# Patient Record
Sex: FEMALE | Race: WHITE | NOT HISPANIC OR LATINO | Employment: PART TIME | URBAN - METROPOLITAN AREA
[De-identification: names, ages, dates, MRNs, and addresses within clinical notes are randomized per-mention and may not be internally consistent; named-entity substitution may affect disease eponyms.]

---

## 2023-03-15 ENCOUNTER — APPOINTMENT (OUTPATIENT)
Dept: LAB | Facility: CLINIC | Age: 33
End: 2023-03-15

## 2023-03-15 ENCOUNTER — OCCMED (OUTPATIENT)
Dept: URGENT CARE | Facility: CLINIC | Age: 33
End: 2023-03-15

## 2023-03-15 DIAGNOSIS — Z02.1 PRE-EMPLOYMENT HEALTH SCREENING EXAMINATION: Primary | ICD-10-CM

## 2023-03-15 DIAGNOSIS — Z02.1 PRE-EMPLOYMENT HEALTH SCREENING EXAMINATION: ICD-10-CM

## 2023-03-15 LAB
RUBV IGG SERPL IA-ACNC: >175 IU/ML
RUBV IGG SERPL IA-ACNC: >175 IU/ML

## 2023-03-15 NOTE — PROGRESS NOTES
This encounter was created for [facility] orders only   [address] [facility]        NAME: Sven Tesfaye is a 33 y o  female  : 1990    MRN: [id number]  DATE: March 15, 2023  TIME: 1:53 PM    There were no vitals taken for this visit  Assessment and Plan   Pre-employment health screening examination [Z02 1]  1  Pre-employment health screening examination  Rubeola antibody IgG    Mumps antibody, IgG    Rubella antibody, IgG    Varicella zoster antibody, IgG    Quantiferon TB Gold Plus    Quantiferon TB Gold Plus    Varicella zoster antibody, IgG    Rubella antibody, IgG    Mumps antibody, IgG    Rubeola antibody IgG    Mumps antibody, IgG    Rubeola antibody IgG    Rubella antibody, IgG    Varicella zoster antibody, IgG    Quantiferon TB Gold Plus            Patient Instructions       Follow up with PCP in 3-5 days  Proceed to  ER if symptoms worsen  Chief Complaint   No chief complaint on file  History of Present Illness       HPI    Review of Systems   Review of Systems      Current Medications     No current outpatient medications on file  Current Allergies     Allergies as of 03/15/2023   • (Not on File)            The following portions of the patient's history were reviewed and updated as appropriate: allergies, current medications, past family history, past medical history, past social history, past surgical history and problem list      No past medical history on file  No past surgical history on file  No family history on file  Medications have been verified  Objective   There were no vitals taken for this visit         Physical Exam     Physical Exam

## 2023-03-16 LAB
MEV IGG SER QL IA: NORMAL
MEV IGG SER QL IA: NORMAL
MUV IGG SER QL IA: NORMAL
MUV IGG SER QL IA: NORMAL
VZV IGG SER QL IA: NORMAL
VZV IGG SER QL IA: NORMAL

## 2023-03-17 LAB
GAMMA INTERFERON BACKGROUND BLD IA-ACNC: 0.04 IU/ML
GAMMA INTERFERON BACKGROUND BLD IA-ACNC: 0.04 IU/ML
M TB IFN-G BLD-IMP: NEGATIVE
M TB IFN-G BLD-IMP: NEGATIVE
M TB IFN-G CD4+ BCKGRND COR BLD-ACNC: 0.01 IU/ML
MITOGEN IGNF BCKGRD COR BLD-ACNC: >10 IU/ML
MITOGEN IGNF BCKGRD COR BLD-ACNC: >10 IU/ML

## 2023-03-29 ENCOUNTER — OFFICE VISIT (OUTPATIENT)
Dept: OBGYN CLINIC | Facility: CLINIC | Age: 33
End: 2023-03-29

## 2023-03-29 VITALS — SYSTOLIC BLOOD PRESSURE: 100 MMHG | DIASTOLIC BLOOD PRESSURE: 60 MMHG | WEIGHT: 166 LBS

## 2023-03-29 DIAGNOSIS — Z30.41 SURVEILLANCE FOR BIRTH CONTROL, ORAL CONTRACEPTIVES: ICD-10-CM

## 2023-03-29 DIAGNOSIS — Z01.419 WOMEN'S ANNUAL ROUTINE GYNECOLOGICAL EXAMINATION: Primary | ICD-10-CM

## 2023-03-29 PROBLEM — O99.891: Status: ACTIVE | Noted: 2021-12-15

## 2023-03-29 PROBLEM — O99.341 ANXIETY DURING PREGNANCY IN FIRST TRIMESTER, ANTEPARTUM: Status: ACTIVE | Noted: 2022-01-19

## 2023-03-29 PROBLEM — F41.9 ANXIETY DURING PREGNANCY IN FIRST TRIMESTER, ANTEPARTUM: Status: ACTIVE | Noted: 2022-01-19

## 2023-03-29 PROBLEM — R53.83 FATIGUE: Status: ACTIVE | Noted: 2021-04-08

## 2023-03-29 PROBLEM — Z34.90 SUPERVISION OF NORMAL PREGNANCY: Status: ACTIVE | Noted: 2022-01-24

## 2023-03-29 PROBLEM — R79.89 ELEVATED CORTISOL LEVEL: Status: ACTIVE | Noted: 2021-04-08

## 2023-03-29 PROBLEM — Z20.4: Status: ACTIVE | Noted: 2021-12-15

## 2023-03-29 PROBLEM — L74.9 SWEATING ABNORMALITY: Status: ACTIVE | Noted: 2021-04-08

## 2023-03-29 RX ORDER — ACETAMINOPHEN AND CODEINE PHOSPHATE 120; 12 MG/5ML; MG/5ML
1 SOLUTION ORAL DAILY
Qty: 84 TABLET | Refills: 3 | Status: SHIPPED | OUTPATIENT
Start: 2023-03-29 | End: 2023-06-21

## 2023-03-29 RX ORDER — BUSPIRONE HYDROCHLORIDE 10 MG/1
TABLET ORAL
COMMUNITY
Start: 2023-03-19

## 2023-03-29 RX ORDER — ACETAMINOPHEN AND CODEINE PHOSPHATE 120; 12 MG/5ML; MG/5ML
SOLUTION ORAL
COMMUNITY
Start: 2023-03-09 | End: 2023-03-29 | Stop reason: SDUPTHER

## 2023-03-29 RX ORDER — SERTRALINE HYDROCHLORIDE 100 MG/1
TABLET, FILM COATED ORAL
COMMUNITY
Start: 2023-03-19

## 2023-03-29 NOTE — PROGRESS NOTES
Subjective    HPI:     Jassi Arroyo is a 29 y o  female  She is a  1 Para 1, with a C/S delivery 7 months ago  Her menstrual cycles are currently absent due to breastfeeding  Her current method of contraception includes Micronor  She states she experiences discomfort with penetration  She feels it's really tight  She underwent pelvic floor therapy after delivery and was noted to have misalignment that was causing her pelvic floor to work harder  She continues to do the exercises that been recommended  She denies /GI and Gyn complaints  She feels safe at home  She is on Zoloft and buspar depression/anxiety  She states she is somewhat stable, currently looking for another therapist  Medical, surgical and family history reviewed  Her dental care is up-to-date  She eats a healthy diet and exercises regularly  She is happy with her weight  Gynecologic History    No LMP recorded  (Menstrual status: Amenorrheic other)  Currently breastfeediing  Gardasil Vaccine Series: completed  Last Pap: 3/1/21  Results were: normal    Obstetric History    OB History    Para Term  AB Living   1 1 1     1   SAB IAB Ectopic Multiple Live Births           1      # Outcome Date GA Lbr Luis/2nd Weight Sex Delivery Anes PTL Lv   1 Term 22 40w1d  3730 g (8 lb 3 6 oz) F CS-Unspec EPI  OFELIA       The following portions of the patient's history were reviewed and updated as appropriate: allergies, current medications, past family history, past medical history, past social history, past surgical history and problem list     Review of Systems    Pertinent items are noted in HPI  Objective    Physical Exam  Constitutional:       Appearance: Normal appearance  She is well-developed  Genitourinary:      Vulva, bladder and urethral meatus normal       No lesions in the vagina        Right Labia: No rash, tenderness, lesions, skin changes or Bartholin's cyst      Left Labia: No tenderness, lesions, skin changes, Bartholin's cyst or rash  No labial fusion noted  No inguinal adenopathy present in the right or left side  No vaginal discharge, erythema, tenderness, bleeding or granulation tissue  No vaginal prolapse present  No vaginal atrophy present  Right Adnexa: not tender, not full and no mass present  Left Adnexa: not tender, not full and no mass present  Cervix is not parous  No cervical motion tenderness, discharge, friability, lesion, polyp or nabothian cyst       Uterus is not enlarged, tender, irregular or prolapsed  No uterine mass detected  Uterus is anteverted  Pelvic exam was performed with patient in the lithotomy position  Breasts:     Breasts are symmetrical       Right: No inverted nipple, mass, nipple discharge, skin change or tenderness  Left: No inverted nipple, mass, nipple discharge, skin change or tenderness  HENT:      Head: Normocephalic and atraumatic  Neck:      Thyroid: No thyromegaly  Cardiovascular:      Rate and Rhythm: Normal rate and regular rhythm  Heart sounds: Normal heart sounds, S1 normal and S2 normal    Pulmonary:      Effort: Pulmonary effort is normal       Breath sounds: Normal breath sounds  Abdominal:      General: Bowel sounds are normal  There is no distension  Palpations: Abdomen is soft  There is no mass  Tenderness: There is no abdominal tenderness  There is no guarding  Hernia: There is no hernia in the left inguinal area or right inguinal area  Musculoskeletal:      Cervical back: Neck supple  Lymphadenopathy:      Cervical: No cervical adenopathy  Upper Body:      Right upper body: No supraclavicular or axillary adenopathy  Left upper body: No supraclavicular or axillary adenopathy  Lower Body: No right inguinal adenopathy  No left inguinal adenopathy  Neurological:      Mental Status: She is alert  Skin:     General: Skin is warm and dry  Findings: No rash  Psychiatric:         Attention and Perception: Attention and perception normal          Mood and Affect: Mood and affect normal          Speech: Speech normal          Behavior: Behavior is cooperative  Thought Content: Thought content normal          Cognition and Memory: Cognition and memory normal          Judgment: Judgment normal    Vitals and nursing note reviewed  Assessment and Plan    Diagnoses and all orders for this visit:    Women's annual routine gynecological examination  -     Liquid-based pap, screening    Surveillance for birth control, oral contraceptives  -     norethindrone (MICRONOR) 0 35 MG tablet; Take 1 tablet (0 35 mg total) by mouth daily      Patient informed of a Stable GYN exam  A pap smear was performed  I have discussed the importance of exercise and healthy diet as well as adequate intake of calcium and vitamin D  The current ASCCP guidelines were reviewed  The low risk patient will receive pap smear screening every 3 years until the age of 34 and then every 3 to 5 years with HPV co-testing from the ages of 33-67  I emphasized the importance of an annual pelvic and breast exam  A yearly mammogram is recommended for breast cancer screening starting at age 36  Results will be released to [facility], if abnormal will call to review and discuss treatment plan  All questions have been answered to her satisfaction  Contraception: Micronor  Follow up in: 1 year or sooner if needed

## 2023-04-03 LAB
HPV HR 12 DNA CVX QL NAA+PROBE: NEGATIVE
HPV16 DNA CVX QL NAA+PROBE: NEGATIVE
HPV18 DNA CVX QL NAA+PROBE: NEGATIVE

## 2023-04-05 LAB
LAB AP GYN PRIMARY INTERPRETATION: NORMAL
Lab: NORMAL

## 2023-05-03 ENCOUNTER — CONSULT (OUTPATIENT)
Dept: SURGERY | Facility: CLINIC | Age: 33
End: 2023-05-03

## 2023-05-03 VITALS
HEIGHT: 63 IN | HEART RATE: 65 BPM | BODY MASS INDEX: 29.61 KG/M2 | TEMPERATURE: 97.3 F | OXYGEN SATURATION: 99 % | WEIGHT: 167.1 LBS | DIASTOLIC BLOOD PRESSURE: 67 MMHG | SYSTOLIC BLOOD PRESSURE: 102 MMHG | RESPIRATION RATE: 12 BRPM

## 2023-05-03 DIAGNOSIS — R22.2 SUBCUTANEOUS NODULE OF ABDOMINAL WALL: Primary | ICD-10-CM

## 2023-05-03 NOTE — ASSESSMENT & PLAN NOTE
There is no evidence of hernia that I can appreciate  There may be a small nodule in the subcu tissue  Nothing to do at this time  See us back here if needed

## 2023-05-03 NOTE — PROGRESS NOTES
Office Visit - General Surgery  Fortunato Reid MRN: [id number]  Encounter: [id number]    Assessment and Plan  Problem List Items Addressed This Visit        Other    Subcutaneous nodule of abdominal wall - Primary     There is no evidence of hernia that I can appreciate  There may be a small nodule in the subcu tissue  Nothing to do at this time  See us back here if needed  Chief Complaint:  Fortunato Reid is a 29 y o  female who presents for Hernia (Consult upper left quadrant hernia )    Subjective  41-year-old female who noted a lump in the left upper abdomen about a week or so ago  She noticed this after doing  Some discomfort  No change in bowel or bladder function    History reviewed  No pertinent past medical history  Past Surgical History:   Procedure Laterality Date     SECTION  2022    TONSILECTOMY AND ADNOIDECTOMY         Family History   Problem Relation Age of Onset    Diabetes type II Mother     Hypertension Mother     Heart block Father        Social History     Tobacco Use    Smoking status: Never    Smokeless tobacco: Never   Substance Use Topics    Alcohol use: Yes    Drug use: Never        Medications  Current Outpatient Medications on File Prior to Visit   Medication Sig Dispense Refill    busPIRone (BUSPAR) 10 mg tablet       norethindrone (MICRONOR) 0 35 MG tablet Take 1 tablet (0 35 mg total) by mouth daily 84 tablet 3    sertraline (ZOLOFT) 100 mg tablet        No current facility-administered medications on file prior to visit  Allergies  Allergies   Allergen Reactions    Penicillins Hives       Review of Systems   Constitutional: Negative for chills and fever  HENT: Negative for ear pain and sore throat  Eyes: Negative for pain and visual disturbance  Respiratory: Negative for cough and shortness of breath  Cardiovascular: Negative for chest pain and palpitations  Gastrointestinal: Negative for abdominal pain and vomiting  Genitourinary: Negative for dysuria and hematuria  Musculoskeletal: Negative for arthralgias and back pain  Skin: Negative for color change and rash  Neurological: Negative for seizures and syncope  All other systems reviewed and are negative  Objective  Vitals:    05/03/23 1506   BP: 102/67   Pulse: 65   Resp: 12   Temp: (!) 97 3 °F (36 3 °C)   SpO2: 99%       Physical Exam  Vitals and nursing note reviewed  Constitutional:       General: She is not in acute distress  Appearance: She is well-developed  She is not diaphoretic  HENT:      Head: Normocephalic and atraumatic  Right Ear: External ear normal       Left Ear: External ear normal    Eyes:      General: No scleral icterus  Conjunctiva/sclera: Conjunctivae normal    Neck:      Thyroid: No thyromegaly  Trachea: No tracheal deviation  Cardiovascular:      Rate and Rhythm: Normal rate and regular rhythm  Heart sounds: Normal heart sounds  No murmur heard  No friction rub  Pulmonary:      Effort: Pulmonary effort is normal  No respiratory distress  Breath sounds: Normal breath sounds  No wheezing or rales  Abdominal:      General: There is no distension  Palpations: Abdomen is soft  There is no mass  Tenderness: There is no abdominal tenderness  There is no guarding or rebound  Comments: Questionable small nodule left upper abdomen no hernia defect   Musculoskeletal:         General: Normal range of motion  Cervical back: Neck supple  Lymphadenopathy:      Cervical: No cervical adenopathy  Skin:     General: Skin is warm and dry  Neurological:      Mental Status: She is alert and oriented to person, place, and time  Psychiatric:         Behavior: Behavior normal          Thought Content:  Thought content normal          Judgment: Judgment normal

## 2023-05-11 ENCOUNTER — HOSPITAL ENCOUNTER (EMERGENCY)
Facility: HOSPITAL | Age: 33
Discharge: HOME | End: 2023-05-11
Attending: EMERGENCY MEDICINE

## 2023-05-11 VITALS
RESPIRATION RATE: 14 BRPM | TEMPERATURE: 97.8 F | SYSTOLIC BLOOD PRESSURE: 122 MMHG | DIASTOLIC BLOOD PRESSURE: 70 MMHG | HEART RATE: 77 BPM | OXYGEN SATURATION: 99 %

## 2023-05-11 DIAGNOSIS — S61.012A LACERATION OF LEFT THUMB WITHOUT FOREIGN BODY WITHOUT DAMAGE TO NAIL, INITIAL ENCOUNTER: Primary | ICD-10-CM

## 2023-05-11 RX ORDER — LIDOCAINE HYDROCHLORIDE 10 MG/ML
5 INJECTION, SOLUTION EPIDURAL; INFILTRATION; INTRACAUDAL; PERINEURAL ONCE
Status: COMPLETED | OUTPATIENT
Start: 2023-05-11 | End: 2023-05-11

## 2023-05-11 RX ADMIN — LIDOCAINE HYDROCHLORIDE 5 ML: 10 INJECTION, SOLUTION EPIDURAL; INFILTRATION; INTRACAUDAL; PERINEURAL at 08:38

## 2023-05-11 NOTE — Clinical Note
Earnest Vazquze was seen and treated in our emergency department on 5/11/2023  No restrictions            Diagnosis:     Maria Esther Ragsdale  may return to work on return date  She may return on this date: 05/12/2023         If you have any questions or concerns, please don't hesitate to call        Ayah Jack MD    ______________________________           _______________          _______________  Hospital Representative                              Date                                Time

## 2023-05-11 NOTE — ED PROVIDER NOTES
History  Chief Complaint   Patient presents with   • Finger Laceration     Pt presents to the ED with c/o laceration 1st digit left hand  Attempted to open a can     HPI    43-year-old female presenting for evaluation of a laceration to her left thumb  Occurred when opening a can of corn this morning  No other areas of injury  Last tetanus shot was in   Hemostatic at the time of arrival     Prior to Admission Medications   Prescriptions Last Dose Informant Patient Reported? Taking?   busPIRone (BUSPAR) 10 mg tablet   Yes No   norethindrone (MICRONOR) 0 35 MG tablet   No No   Sig: Take 1 tablet (0 35 mg total) by mouth daily   sertraline (ZOLOFT) 100 mg tablet   Yes No      Facility-Administered Medications: None       History reviewed  No pertinent past medical history  Past Surgical History:   Procedure Laterality Date   •  SECTION  2022   • TONSILECTOMY AND ADNOIDECTOMY         Family History   Problem Relation Age of Onset   • Diabetes type II Mother    • Hypertension Mother    • Heart block Father      I have reviewed and agree with the history as documented  E-Cigarette/Vaping     E-Cigarette/Vaping Substances     Social History     Tobacco Use   • Smoking status: Never   • Smokeless tobacco: Never   Substance Use Topics   • Alcohol use: Yes   • Drug use: Never       Review of Systems   Skin: Positive for wound  Neurological: Negative for weakness and numbness  Physical Exam  Physical Exam  Vitals and nursing note reviewed  Constitutional:       General: She is not in acute distress  Appearance: Normal appearance  She is not ill-appearing  Cardiovascular:      Rate and Rhythm: Normal rate  Pulses: Normal pulses  Pulmonary:      Effort: Pulmonary effort is normal  No respiratory distress  Abdominal:      General: There is no distension  Musculoskeletal:      Comments: 3 cm laceration over the volar aspect of the L thumb just proximal to the IP joint   Well approximated but gapes open with full ROM of the IP joint  Superficial  No surrounding erythema or edema  Neurological:      Comments: Intact sensation to light touch to the tip of the L thumb         Vital Signs  ED Triage Vitals [05/11/23 0818]   Temperature Pulse Respirations Blood Pressure SpO2   97 8 °F (36 6 °C) 77 14 122/70 99 %      Temp Source Heart Rate Source Patient Position - Orthostatic VS BP Location FiO2 (%)   Oral Monitor -- Right arm --      Pain Score       --           Vitals:    05/11/23 0818   BP: 122/70   Pulse: 77         Visual Acuity      ED Medications  Medications   lidocaine (PF) (XYLOCAINE-MPF) 1 % injection 5 mL (5 mL Infiltration Given by Other 5/11/23 4317)       Diagnostic Studies  Results Reviewed     None                 No orders to display              Procedures  Laceration repair    Date/Time: 5/11/2023 9:04 AM  Performed by: Stephen Gomez MD  Authorized by: Stephen Gomez MD   Consent: Verbal consent obtained  Risks and benefits: risks, benefits and alternatives were discussed  Consent given by: patient  Required items: required blood products, implants, devices, and special equipment available  Body area: upper extremity  Location details: left thumb  Laceration length: 3 cm  Tendon involvement: none  Vascular damage: no  Anesthesia: local infiltration    Wound Dehiscence:  Superficial Wound Dehiscence: simple closure      Procedure Details:  Preparation: Patient was prepped and draped in the usual sterile fashion  Irrigation solution: saline  Irrigation method: syringe  Amount of cleaning: standard  Debridement: none  Degree of undermining: none  Wound skin closure material used: 5-0 ethilon    Number of sutures: 3  Technique: simple  Approximation: close  Approximation difficulty: simple  Dressing: gauze roll  Patient tolerance: patient tolerated the procedure well with no immediate complications               ED Course Medical Decision Making  3 cm superficial laceration repaired with 5-0 Ethilon according to procedure note above  Thumb is neurovascularly intact  No other areas of injury  Tetanus up-to-date  Sutures to be removed in 7 to 10 days  Reasons to return to the emergency department discussed  Risk  Prescription drug management  Disposition  Final diagnoses:   Laceration of left thumb without foreign body without damage to nail, initial encounter     Time reflects when diagnosis was documented in both MDM as applicable and the Disposition within this note     Time User Action Codes Description Comment    5/11/2023  8:52 AM Gina Neff Add [S61 012A] Laceration of left thumb without foreign body without damage to nail, initial encounter       ED Disposition     ED Disposition   Discharge    Condition   Stable    Date/Time   Thu May 11, 2023  8:51 AM    Comment   Armida Fonseca discharge to home/self care  Follow-up Information     Follow up With Specialties Details Why Contact Info Additional [facility] Emergency Department Emergency Medicine  As we discussed, return to the Emergency Department immediately for redness and swelling to your thumb or for drainage that looks like pus  [address] Emergency Department, [address], [place], [place], [address]          Discharge Medication List as of 5/11/2023  8:52 AM      CONTINUE these medications which have NOT CHANGED    Details   busPIRone (BUSPAR) 10 mg tablet Starting Sun 3/19/2023, Historical Med      norethindrone (MICRONOR) 0 35 MG tablet Take 1 tablet (0 35 mg total) by mouth daily, Starting Wed 3/29/2023, Until Wed 6/21/2023, Normal      sertraline (ZOLOFT) 100 mg tablet Starting Sun 3/19/2023, Historical Med             No discharge procedures on file      PDMP Review     None          ED Provider  Electronically Signed by           Ciara Hills MD  05/11/23 4528

## 2023-05-18 ENCOUNTER — OFFICE VISIT (OUTPATIENT)
Dept: URGENT CARE | Age: 33
End: 2023-05-18

## 2023-05-18 VITALS
HEART RATE: 74 BPM | DIASTOLIC BLOOD PRESSURE: 61 MMHG | OXYGEN SATURATION: 98 % | RESPIRATION RATE: 18 BRPM | SYSTOLIC BLOOD PRESSURE: 107 MMHG | TEMPERATURE: 98.2 F

## 2023-05-18 DIAGNOSIS — Z48.02 ENCOUNTER FOR REMOVAL OF SUTURES: ICD-10-CM

## 2023-05-18 DIAGNOSIS — S61.012D LACERATION OF LEFT THUMB WITHOUT FOREIGN BODY WITHOUT DAMAGE TO NAIL, SUBSEQUENT ENCOUNTER: Primary | ICD-10-CM

## 2023-05-18 RX ORDER — SULFAMETHOXAZOLE AND TRIMETHOPRIM 800; 160 MG/1; MG/1
1 TABLET ORAL EVERY 12 HOURS SCHEDULED
Qty: 10 TABLET | Refills: 0 | Status: SHIPPED | OUTPATIENT
Start: 2023-05-18 | End: 2023-05-23

## 2023-05-18 NOTE — PROGRESS NOTES
[facility]        NAME: Shade Campo is a 29 y o  female  : 1990    MRN: [id number]  DATE: May 18, 2023  TIME: 3:51 PM    Assessment and Plan   Laceration of left thumb without foreign body without damage to nail, subsequent encounter [S61 012D]  1  Laceration of left thumb without foreign body without damage to nail, subsequent encounter  sulfamethoxazole-trimethoprim (BACTRIM DS) 800-160 mg per tablet    Suture removal      2  Encounter for removal of sutures              Patient Instructions       Follow up with PCP in 3-5 days  Proceed to  ER if symptoms worsen  Chief Complaint     Chief Complaint   Patient presents with   • Wound Check     Patient states that she cut herself last Wednesday and went to [facility] for sutures  She is not sure if there is any infection because it has gotten a little red and swollen around the site  Today is day 8 of her having sutures in         History of Present Illness       HPI   Reports she received 3 sutures on the left thumb about 8-9 days ago  One of the sutures came off  However there is a bit of increase in pain on the thumb and mild redness  She wants to make sure it is not infection  Denies pus or open areas or drainage  Review of Systems   Review of Systems   Skin: Positive for color change (red) and wound (left thumb)  Neurological: Negative for weakness and numbness           Current Medications       Current Outpatient Medications:   •  busPIRone (BUSPAR) 10 mg tablet, , Disp: , Rfl:   •  norethindrone (MICRONOR) 0 35 MG tablet, Take 1 tablet (0 35 mg total) by mouth daily, Disp: 84 tablet, Rfl: 3  •  sertraline (ZOLOFT) 100 mg tablet, , Disp: , Rfl:   •  sulfamethoxazole-trimethoprim (BACTRIM DS) 800-160 mg per tablet, Take 1 tablet by mouth every 12 (twelve) hours for 5 days, Disp: 10 tablet, Rfl: 0    Current Allergies     Allergies as of 2023 - Reviewed 2023   Allergen Reaction Noted   • Penicillins Hives 1996 The following portions of the patient's history were reviewed and updated as appropriate: allergies, current medications, past family history, past medical history, past social history, past surgical history and problem list      No past medical history on file  Past Surgical History:   Procedure Laterality Date   •  SECTION  2022   • TONSILECTOMY AND ADNOIDECTOMY         Family History   Problem Relation Age of Onset   • Diabetes type II Mother    • Hypertension Mother    • Heart block Father          Medications have been verified  Objective   /61   Pulse 74   Temp 98 2 °F (36 8 °C)   Resp 18   SpO2 98%   No LMP recorded  (Menstrual status: Amenorrheic other)  Physical Exam     Physical Exam  Skin:     Capillary Refill: Capillary refill takes less than 2 seconds  Findings: No bruising or rash  Comments: There is a small laceration about 1 5 cm in length  Two sutures present  Left thumb       Suture removal    Date/Time: 2023 2:00 PM  Performed by: KOURTNEY Locke  Authorized by: KOURTNEY Locke   Universal Protocol:  Consent: Verbal consent obtained  Consent given by: patient  Patient understanding: patient states understanding of the procedure being performed  Patient identity confirmed: verbally with patient        Patient location:  Clinic  Location:     Laterality:  Left    Location:  Upper extremity    Upper extremity location:  Hand    Hand location:  L thumb  Procedure details: Tools used:  Suture removal kit    Wound appearance:  Good wound healing and clean (mild erythema of the surrounding skin but no drainage or pus  No open areas)    Number of sutures removed:  2  Post-procedure details:     Post-procedure assessment: cleaned with alcohol and betadine  Patient tolerance of procedure:   Tolerated well, no immediate complications

## 2023-06-01 ENCOUNTER — OFFICE VISIT (OUTPATIENT)
Dept: FAMILY MEDICINE CLINIC | Facility: CLINIC | Age: 33
End: 2023-06-01

## 2023-06-01 VITALS
HEART RATE: 88 BPM | SYSTOLIC BLOOD PRESSURE: 118 MMHG | TEMPERATURE: 97.5 F | HEIGHT: 63 IN | BODY MASS INDEX: 30.39 KG/M2 | DIASTOLIC BLOOD PRESSURE: 64 MMHG | WEIGHT: 171.5 LBS | OXYGEN SATURATION: 98 %

## 2023-06-01 DIAGNOSIS — Z00.00 ANNUAL PHYSICAL EXAM: Primary | ICD-10-CM

## 2023-06-01 DIAGNOSIS — Z76.89 ENCOUNTER TO ESTABLISH CARE: ICD-10-CM

## 2023-06-01 PROBLEM — F41.9 ANXIETY: Status: ACTIVE | Noted: 2023-06-01

## 2023-06-01 PROBLEM — F41.9 ANXIETY DURING PREGNANCY IN FIRST TRIMESTER, ANTEPARTUM: Status: RESOLVED | Noted: 2022-01-19 | Resolved: 2023-06-01

## 2023-06-01 PROBLEM — Z34.90 SUPERVISION OF NORMAL PREGNANCY: Status: RESOLVED | Noted: 2022-01-24 | Resolved: 2023-06-01

## 2023-06-01 PROBLEM — O99.341 ANXIETY DURING PREGNANCY IN FIRST TRIMESTER, ANTEPARTUM: Status: RESOLVED | Noted: 2022-01-19 | Resolved: 2023-06-01

## 2023-06-01 PROBLEM — O99.891: Status: RESOLVED | Noted: 2021-12-15 | Resolved: 2023-06-01

## 2023-06-01 PROBLEM — Z20.4: Status: RESOLVED | Noted: 2021-12-15 | Resolved: 2023-06-01

## 2023-06-01 NOTE — PROGRESS NOTES
[address]    NAME: Tiffanie Brenner  AGE: 29 y o  SEX: female  : 1990     DATE: 2023     Assessment and Plan:     Problem List Items Addressed This Visit    None  Visit Diagnoses     Annual physical exam    -  Primary    Encounter to establish care              Immunizations and preventive care screenings were discussed with patient today  Appropriate education was printed on patient's after visit summary  Counseling:  Alcohol/drug use: discussed moderation in alcohol intake, the recommendations for healthy alcohol use, and avoidance of illicit drug use  Exercise: the importance of regular exercise/physical activity was discussed  Recommend exercise 3-5 times per week for at least 30 minutes  BMI Counseling: Body mass index is 30 38 kg/m²  The BMI is above normal  Nutrition recommendations include decreasing portion sizes, encouraging healthy choices of fruits and vegetables, consuming healthier snacks, moderation in carbohydrate intake and increasing intake of lean protein  Exercise recommendations include exercising 3-5 times per week and strength training exercises  Rationale for BMI follow-up plan is due to patient being overweight or obese  Return in about 1 year (around 2024)  Chief Complaint:     Chief Complaint   Patient presents with   • Physical Exam   • Establish Care      History of Present Illness:     Adult Annual Physical   Patient here to establish care and for a comprehensive physical exam  The patient reports that she recent relocated to this area from [place], [place]  and has become established with a gynecologist  She reports that she has been treated for anxiety in the past and has scheduled an appointment to establish with a psychiatrist and a dermatologist  The patient reports no problems  Patient is currently breast feeding her nine-month old daughter      Diet and Physical Activity  Diet/Nutrition: well balanced diet, consuming 3-5 servings of fruits/vegetables daily and adequate fiber intake  Exercise: moderate cardiovascular exercise, 3-4 times a week on average and 30-60 minutes on average  Depression Screening  PHQ-2/9 Depression Screening    Little interest or pleasure in doing things: 1 - several days  Feeling down, depressed, or hopeless: 1 - several days  Trouble falling or staying asleep, or sleeping too much: 1 - several days  Feeling tired or having little energy: 1 - several days  Poor appetite or overeatin - several days  Feeling bad about yourself - or that you are a failure or have let yourself or your family down: 1 - several days  Trouble concentrating on things, such as reading the newspaper or watching television: 1 - several days  Moving or speaking so slowly that other people could have noticed  Or the opposite - being so fidgety or restless that you have been moving around a lot more than usual: 0 - not at all  Thoughts that you would be better off dead, or of hurting yourself in some way: 0 - not at all  PHQ-2 Score: 2  PHQ-2 Interpretation: Negative depression screen  PHQ-9 Score: 7   PHQ-9 Interpretation: Mild depression        General Health  Sleep: sleeps well and gets 4-6 hours of sleep on average  Hearing: normal - bilateral   Vision: no vision problems, goes for regular eye exams and wears glasses  Dental: regular dental visits, brushes teeth twice daily and flosses teeth occasionally  /GYN Health  Last menstrual period: 2022  Contraceptive method: oral contraceptives  History of STDs?: no      Review of Systems:     Review of Systems   Constitutional: Negative for activity change, appetite change and unexpected weight change  HENT: Negative for dental problem, ear pain, hearing loss, nosebleeds, sneezing, sore throat, tinnitus and trouble swallowing  Eyes: Negative for visual disturbance     Respiratory: Negative for cough, chest tightness, shortness of breath and wheezing  Cardiovascular: Positive for palpitations ( related to anxiety)  Negative for chest pain and leg swelling  Gastrointestinal: Negative for abdominal pain, constipation, diarrhea and nausea  Endocrine: Negative for polydipsia and polyuria  Genitourinary: Negative  Musculoskeletal: Negative for arthralgias, back pain, myalgias and neck pain  Skin: Negative for color change and rash  Allergic/Immunologic: Positive for environmental allergies  Neurological: Positive for headaches ( barometeric pressure changes)  Negative for dizziness, weakness and light-headedness  Hematological: Negative  Psychiatric/Behavioral: Negative for dysphoric mood and sleep disturbance  The patient is nervous/anxious  Past Medical History:     History reviewed  No pertinent past medical history     Past Surgical History:     Past Surgical History:   Procedure Laterality Date   •  SECTION  2022   • TONSILECTOMY AND ADNOIDECTOMY        Social History:     Social History     Socioeconomic History   • Marital status: Unknown     Spouse name: None   • Number of children: None   • Years of education: None   • Highest education level: None   Occupational History   • None   Tobacco Use   • Smoking status: Never   • Smokeless tobacco: Never   Vaping Use   • Vaping Use: Never used   Substance and Sexual Activity   • Alcohol use: Yes     Comment: social   • Drug use: Never   • Sexual activity: Yes     Partners: Male     Birth control/protection: OCP   Other Topics Concern   • None   Social History Narrative   • None     Social Determinants of Health     Financial Resource Strain: Not on file   Food Insecurity: Not on file   Transportation Needs: Not on file   Physical Activity: Not on file   Stress: Not on file   Social Connections: Not on file   Intimate Partner Violence: Not on file   Housing Stability: Not on file      Family History:     Family History "  Problem Relation Age of Onset   • Diabetes type II Mother    • Hypertension Mother    • Coronary artery disease Father    • Heart block Father    • Cancer Maternal Grandfather    • Coronary artery disease Paternal Grandfather       Current Medications:     Current Outpatient Medications   Medication Sig Dispense Refill   • busPIRone (BUSPAR) 10 mg tablet      • LINOLEIC ACID-SUNFLOWER OIL PO Take 1 tablet by mouth 2 (two) times a day     • norethindrone (MICRONOR) 0 35 MG tablet Take 1 tablet (0 35 mg total) by mouth daily 84 tablet 3   • Prenatal Vit-Fe Fumarate-FA (PRENATAL VITAMIN PO) Take 1 tablet by mouth in the morning     • sertraline (ZOLOFT) 100 mg tablet        No current facility-administered medications for this visit  Allergies: Allergies   Allergen Reactions   • Penicillins Hives      Physical Exam:     /64 (BP Location: Right arm, Patient Position: Sitting)   Pulse 88   Temp 97 5 °F (36 4 °C)   Ht 5' 3\" (1 6 m)   Wt 77 8 kg (171 lb 8 oz)   LMP 11/14/2022 (Approximate)   SpO2 98%   Breastfeeding Yes   BMI 30 38 kg/m² (Reviewed)    Physical Exam  Vitals reviewed  Constitutional:       General: She is not in acute distress  Appearance: She is well-developed and well-groomed  She is not ill-appearing  HENT:      Head: Normocephalic and atraumatic  Right Ear: External ear normal       Left Ear: External ear normal       Nose: Nose normal       Mouth/Throat:      Lips: Pink  Mouth: Mucous membranes are moist       Pharynx: Oropharynx is clear  Eyes:      General: Lids are normal       Extraocular Movements: Extraocular movements intact  Conjunctiva/sclera: Conjunctivae normal       Pupils: Pupils are equal, round, and reactive to light  Neck:      Thyroid: No thyromegaly or thyroid tenderness  Trachea: Trachea and phonation normal    Cardiovascular:      Rate and Rhythm: Normal rate and regular rhythm  Pulses: Normal pulses             Radial " pulses are 2+ on the right side and 2+ on the left side  Dorsalis pedis pulses are 2+ on the right side and 2+ on the left side  Posterior tibial pulses are 2+ on the right side and 2+ on the left side  Heart sounds: Normal heart sounds  No murmur heard  No gallop  Pulmonary:      Effort: Pulmonary effort is normal       Breath sounds: Normal breath sounds  Abdominal:      General: Abdomen is flat  Bowel sounds are normal  There is no distension  Palpations: Abdomen is soft  Tenderness: There is no abdominal tenderness  Musculoskeletal:         General: Normal range of motion  Cervical back: Neck supple  Right lower leg: No edema  Left lower leg: No edema  Skin:     General: Skin is warm and dry  Capillary Refill: Capillary refill takes less than 2 seconds  Neurological:      General: No focal deficit present  Mental Status: She is alert and oriented to person, place, and time  Motor: Motor function is intact  Psychiatric:         Mood and Affect: Mood normal          Behavior: Behavior normal  Behavior is cooperative            [address]

## 2023-06-09 DIAGNOSIS — L03.811 CELLULITIS OF HEAD EXCEPT FACE: Primary | ICD-10-CM

## 2023-06-09 RX ORDER — CEPHALEXIN 500 MG/1
500 CAPSULE ORAL EVERY 8 HOURS SCHEDULED
Qty: 15 CAPSULE | Refills: 0 | Status: SHIPPED | OUTPATIENT
Start: 2023-06-09 | End: 2023-06-14

## 2023-07-06 ENCOUNTER — OFFICE VISIT (OUTPATIENT)
Dept: DERMATOLOGY | Facility: CLINIC | Age: 33
End: 2023-07-06
Payer: COMMERCIAL

## 2023-07-06 VITALS — WEIGHT: 170 LBS | BODY MASS INDEX: 30.12 KG/M2 | HEIGHT: 63 IN

## 2023-07-06 DIAGNOSIS — D18.01 CHERRY ANGIOMA: ICD-10-CM

## 2023-07-06 DIAGNOSIS — L85.3 XEROSIS OF SKIN: ICD-10-CM

## 2023-07-06 DIAGNOSIS — D22.60 MULTIPLE BENIGN MELANOCYTIC NEVI OF UPPER AND LOWER EXTREMITIES AND TRUNK: Primary | ICD-10-CM

## 2023-07-06 DIAGNOSIS — D22.70 MULTIPLE BENIGN MELANOCYTIC NEVI OF UPPER AND LOWER EXTREMITIES AND TRUNK: Primary | ICD-10-CM

## 2023-07-06 DIAGNOSIS — D22.5 MULTIPLE BENIGN MELANOCYTIC NEVI OF UPPER AND LOWER EXTREMITIES AND TRUNK: Primary | ICD-10-CM

## 2023-07-06 DIAGNOSIS — L81.4 LENTIGO: ICD-10-CM

## 2023-07-06 PROCEDURE — 99203 OFFICE O/P NEW LOW 30 MIN: CPT | Performed by: DERMATOLOGY

## 2023-07-06 RX ORDER — BUPROPION HYDROCHLORIDE 100 MG/1
100 TABLET, EXTENDED RELEASE ORAL DAILY
COMMUNITY
Start: 2023-06-05

## 2023-07-06 NOTE — PATIENT INSTRUCTIONS
1. MELANOCYTIC NEVI ("Moles")  Assessment and Plan:  Based on a thorough discussion of this condition and the management approach to it (including a comprehensive discussion of the known risks, side effects and potential benefits of treatment), the patient (family) agrees to implement the following specific plan:  When outside we recommend using a wide brim hat, sunglasses, long sleeve and pants, sunscreen with SPF 93+ with reapplication every 2 hours, or SPF specific clothing   Benign, reassured  Annual skin check     Melanocytic Nevi  Melanocytic nevi ("moles") are tan or brown, raised or flat areas of the skin which have an increased number of melanocytes. Melanocytes are the cells in our body which make pigment and account for skin color. Some moles are present at birth (I.e., "congenital nevi"), while others come up later in life (i.e., "acquired nevi"). The sun can stimulate the body to make more moles. Sunburns are not the only thing that triggers more moles. Chronic sun exposure can do it too. Clinically distinguishing a healthy mole from melanoma may be difficult, even for experienced dermatologists. The "ABCDE's" of moles have been suggested as a means of helping to alert a person to a suspicious mole and the possible increased risk of melanoma. The suggestions for raising alert are as follows:    Asymmetry: Healthy moles tend to be symmetric, while melanomas are often asymmetric. Asymmetry means if you draw a line through the mole, the two halves do not match in color, size, shape, or surface texture. Asymmetry can be a result of rapid enlargement of a mole, the development of a raised area on a previously flat lesion, scaling, ulceration, bleeding or scabbing within the mole. Any mole that starts to demonstrate "asymmetry" should be examined promptly by a board certified dermatologist.     Border: Healthy moles tend to have discrete, even borders.   The border of a melanoma often blends into the normal skin and does not sharply delineate the mole from normal skin. Any mole that starts to demonstrate "uneven borders" should be examined promptly by a board certified dermatologist.     Color: Healthy moles tend to be one color throughout. Melanomas tend to be made up of different colors ranging from dark black, blue, white, or red. Any mole that demonstrates a color change should be examined promptly by a board certified dermatologist.     Diameter: Healthy moles tend to be smaller than 0.6 cm in size; an exception are "congenital nevi" that can be larger. Melanomas tend to grow and can often be greater than 0.6 cm (1/4 of an inch, or the size of a pencil eraser). This is only a guideline, and many normal moles may be larger than 0.6 cm without being unhealthy. Any mole that starts to change in size (small to bigger or bigger to smaller) should be examined promptly by a board certified dermatologist.     Evolving: Healthy moles tend to "stay the same."  Melanomas may often show signs of change or evolution such as a change in size, shape, color, or elevation. Any mole that starts to itch, bleed, crust, burn, hurt, or ulcerate or demonstrate a change or evolution should be examined promptly by a board certified dermatologist.        2. LENTIGO  Assessment and Plan:  Based on a thorough discussion of this condition and the management approach to it (including a comprehensive discussion of the known risks, side effects and potential benefits of treatment), the patient (family) agrees to implement the following specific plan:  When outside we recommend using a wide brim hat, sunglasses, long sleeve and pants, sunscreen with SPF 78+ with reapplication every 2 hours, or SPF specific clothing       What is a lentigo? A lentigo is a pigmented flat or slightly raised lesion with a clearly defined edge. Unlike an ephelis (freckle), it does not fade in the winter months.  There are several kinds of lentigo. The name lentigo originally referred to its appearance resembling a small lentil. The plural of lentigo is lentigines, although “lentigos” is also in common use. Who gets lentigines? Lentigines can affect males and females of all ages and races. Solar lentigines are especially prevalent in fair skinned adults. Lentigines associated with syndromes are present at birth or arise during childhood. What causes lentigines? Common forms of lentigo are due to exposure to ultraviolet radiation:  Sun damage including sunburn   Indoor tanning   Phototherapy, especially photochemotherapy (PUVA)    Ionizing radiation, eg radiation therapy, can also cause lentigines. Several familial syndromes associated with widespread lentigines originate from mutations in Dionicio-MAP kinase, mTOR signaling and PTEN pathways. What is the treatment for lentigines? Most lentigines are left alone. Attempts to lighten them may not be successful. The following approaches are used:  SPF 50+ broad-spectrum sunscreen   Hydroquinone bleaching cream   Alpha hydroxy acids   Vitamin C   Retinoids   Azelaic acid   Chemical peels  Individual lesions can be permanently removed using:  Cryotherapy   Intense pulsed light   Pigment lasers    How can lentigines be prevented? Lentigines associated with exposure ultraviolet radiation can be prevented by very careful sun protection. Clothing is more successful at preventing new lentigines than are sunscreens. What is the outlook for lentigines? Lentigines usually persist. They may increase in number with age and sun exposure. Some in sun-protected sites may fade and disappear.     3. CALDERON ANGIOMAS  Assessment and Plan:  Based on a thorough discussion of this condition and the management approach to it (including a comprehensive discussion of the known risks, side effects and potential benefits of treatment), the patient (family) agrees to implement the following specific plan:  Monitor for changes  Benign, reassured    Assessment and Plan:    Cherry angioma, also known as [employer] spots, are benign vascular skin lesions. A "cherry angioma" is a firm red, blue or purple papule, 0.1-1 cm in diameter. When thrombosed, they can appear black in colour until evaluated with a dermatoscope when the red or purple colour is more easily seen. Cherry angioma may develop on any part of the body but most often appear on the scalp, face, lips and trunk. An angioma is due to proliferating endothelial cells; these are the cells that line the inside of a blood vessel. Angiomas can arise in early life or later in life; the most common type of angioma is a cherry angioma. Cherry angiomas are very common in males and females of any age or race. They are more noticeable in white skin than in skin of colour. They markedly increase in number from about the age of 36. There may be a family history of similar lesions. Eruptive cherry angiomas have been rarely reported to be associated with internal malignancy. The cause of angiomas is unknown. Genetic analysis of cherry angiomas has shown that they frequently carry specific somatic missense mutations in the GNAQ and GNA11 (Q209H) genes, which are involved in other vascular and melanocytic proliferations. 4. XEROSIS ("DRY SKIN")  Assessment and Plan:  Based on a thorough discussion of this condition and the management approach to it (including a comprehensive discussion of the known risks, side effects and potential benefits of treatment), the patient (family) agrees to implement the following specific plan:  Use moisturizer like Eucerin,Cerave or Aveeno Cream 3 times a day for the dry skin            Dry skin refers to skin that feels dry to touch. Dry skin has a dull surface with a rough, scaly quality. The skin is less pliable and cracked. When dryness is severe, the skin may become inflamed and fissured.   Although any body site can be dry, dry skin tends to affect the shins more than any other site. Dry skin is lacking moisture in the outer horny cell layer (stratum corneum) and this results in cracks in the skin surface. Dry skin is also called xerosis, xeroderma or asteatosis (lack of fat). It can affect males and females of all ages. There is some racial variability in water and lipid content of the skin. Dry skin that starts in early childhood may be one of about 20 types of ichthyosis (fish-scale skin). There is often a family history of dry skin. Dry skin is commonly seen in people with atopic dermatitis. Nearly everyone > 60 years has dry skin. Dry skin that begins later may be seen in people with certain diseases and conditions. Postmenopausal women  Hypothyroidism  Chronic renal disease   Malnutrition and weight loss   Subclinical dermatitis   Treatment with certain drugs such as oral retinoids, diuretics and epidermal growth factor receptor inhibitors      What is the treatment for dry skin? The mainstay of treatment of dry skin and ichthyosis is moisturisers/emollients. They should be applied liberally and often enough to:  Reduce itch   Improve the barrier function   Prevent entry of irritants, bacteria   Reduce transepidermal water loss. How can dry skin be prevented? Eliminate aggravating factors:  Reduce the frequency of bathing. A humidifier in winter and air conditioner in summer   Compare having a short shower with a prolonged soak in a bath. Use lukewarm, not hot, water. Replace standard soap with a substitute such as a synthetic detergent cleanser, water-miscible emollient, bath oil, anti-pruritic tar oil, colloidal oatmeal etc.   Apply an emollient liberally and often, particularly shortly after bathing, and when itchy. The drier the skin, the thicker this should be, especially on the hands. What is the outlook for dry skin?   A tendency to dry skin may persist life-long, or it may improve once contributing factors are controlled.

## 2023-07-06 NOTE — PROGRESS NOTES
[facility] Note     Patient Name: Percy Barrera  Encounter Date: 07/06/2023     Have you been cared for by a [facility] D[facility]atologist in the last 3 years and, if so, which description applies to you? NO. I am considered a "new" patient and must complete all patient intake questions. I am FEMALE/of child-bearing potential.    REVIEW OF SYSTEMS:  Have you recently had or currently have any of the following? · Recent fever or chills? No  · Any non-healing wound? No  · Are you pregnant or planning to become pregnant? No  · Are you currently or planning to be nursing or breast feeding? YES, currently    PAST MEDICAL HISTORY:  Have you personally ever had or currently have any of the following? If "YES," then please provide more detail. · Skin cancer (such as Melanoma, Basal Cell Carcinoma, Squamous Cell Carcinoma? No  · Tuberculosis, HIV/AIDS, Hepatitis B or C: No  · Systemic Immunosuppression such as Diabetes, Biologic or Immunotherapy, Chemotherapy, Organ Transplantation, Bone Marrow Transplantation No  · Radiation Treatment No   FAMILY HISTORY:  Any "first degree relatives" (parent, brother, sister, or child) with the following? • Skin Cancer, Pancreatic or Other Cancer? No   PATIENT EXPERIENCE:    • Do you want the Dermatologist to perform a COMPLETE skin exam today including a clinical examination under the "bra and underwear" areas? Yes  • If necessary, do we have your permission to call and leave a detailed message on your Preferred Phone number that includes your specific medical information?   Yes      Allergies   Allergen Reactions   • Penicillins Hives      Current Outpatient Medications:   •  busPIRone (BUSPAR) 10 mg tablet, , Disp: , Rfl:   •  LINOLEIC ACID-SUNFLOWER OIL PO, Take 1 tablet by mouth 2 (two) times a day, Disp: , Rfl:   •  norethindrone (MICRONOR) 0.35 MG tablet, Take 1 tablet (0.35 mg total) by mouth daily, Disp: 84 tablet, Rfl: 3  •  Prenatal Vit-Fe Fumarate-FA (PRENATAL VITAMIN PO), Take 1 tablet by mouth in the morning, Disp: , Rfl:   •  sertraline (ZOLOFT) 100 mg tablet, , Disp: , Rfl:           • Whom besides the patient is providing clinical information about today's encounter?   o NO ADDITIONAL HISTORIAN (patient alone provided history)    Physical Exam and Assessment/Plan by Diagnosis:    1. MELANOCYTIC NEVI ("Moles")    Physical Exam:  • Anatomic Location Affected:   Mostly on sun-exposed areas of the trunk and extremities  • Morphological Description:  Scattered, 1-4mm round to ovoid, symmetrical-appearing, even bordered, skin colored to dark brown macules/papules, mostly in sun-exposed areas  • Pertinent Positives:  • Pertinent Negatives:    Assessment and Plan:  Based on a thorough discussion of this condition and the management approach to it (including a comprehensive discussion of the known risks, side effects and potential benefits of treatment), the patient (family) agrees to implement the following specific plan:  • When outside we recommend using a wide brim hat, sunglasses, long sleeve and pants, sunscreen with SPF 93+ with reapplication every 2 hours, or SPF specific clothing   • Benign, reassured  • Annual skin check     Melanocytic Nevi  Melanocytic nevi ("moles") are tan or brown, raised or flat areas of the skin which have an increased number of melanocytes. Melanocytes are the cells in our body which make pigment and account for skin color. Some moles are present at birth (I.e., "congenital nevi"), while others come up later in life (i.e., "acquired nevi"). The sun can stimulate the body to make more moles. Sunburns are not the only thing that triggers more moles. Chronic sun exposure can do it too. Clinically distinguishing a healthy mole from melanoma may be difficult, even for experienced dermatologists.  The "ABCDE's" of moles have been suggested as a means of helping to alert a person to a suspicious mole and the possible increased risk of melanoma. The suggestions for raising alert are as follows:    Asymmetry: Healthy moles tend to be symmetric, while melanomas are often asymmetric. Asymmetry means if you draw a line through the mole, the two halves do not match in color, size, shape, or surface texture. Asymmetry can be a result of rapid enlargement of a mole, the development of a raised area on a previously flat lesion, scaling, ulceration, bleeding or scabbing within the mole. Any mole that starts to demonstrate "asymmetry" should be examined promptly by a board certified dermatologist.     Border: Healthy moles tend to have discrete, even borders. The border of a melanoma often blends into the normal skin and does not sharply delineate the mole from normal skin. Any mole that starts to demonstrate "uneven borders" should be examined promptly by a board certified dermatologist.     Color: Healthy moles tend to be one color throughout. Melanomas tend to be made up of different colors ranging from dark black, blue, white, or red. Any mole that demonstrates a color change should be examined promptly by a board certified dermatologist.     Diameter: Healthy moles tend to be smaller than 0.6 cm in size; an exception are "congenital nevi" that can be larger. Melanomas tend to grow and can often be greater than 0.6 cm (1/4 of an inch, or the size of a pencil eraser). This is only a guideline, and many normal moles may be larger than 0.6 cm without being unhealthy. Any mole that starts to change in size (small to bigger or bigger to smaller) should be examined promptly by a board certified dermatologist.     Evolving: Healthy moles tend to "stay the same."  Melanomas may often show signs of change or evolution such as a change in size, shape, color, or elevation.   Any mole that starts to itch, bleed, crust, burn, hurt, or ulcerate or demonstrate a change or evolution should be examined promptly by a board certified dermatologist. 2. LENTIGO    Physical Exam:  • Anatomic Location Affected:  trunk, arms  • Morphological Description:  Light brown macules  • Pertinent Positives:  • Pertinent Negatives:    Assessment and Plan:  Based on a thorough discussion of this condition and the management approach to it (including a comprehensive discussion of the known risks, side effects and potential benefits of treatment), the patient (family) agrees to implement the following specific plan:  • When outside we recommend using a wide brim hat, sunglasses, long sleeve and pants, sunscreen with SPF 65+ with reapplication every 2 hours, or SPF specific clothing       What is a lentigo? A lentigo is a pigmented flat or slightly raised lesion with a clearly defined edge. Unlike an ephelis (freckle), it does not fade in the winter months. There are several kinds of lentigo. The name lentigo originally referred to its appearance resembling a small lentil. The plural of lentigo is lentigines, although “lentigos” is also in common use. Who gets lentigines? Lentigines can affect males and females of all ages and races. Solar lentigines are especially prevalent in fair skinned adults. Lentigines associated with syndromes are present at birth or arise during childhood. What causes lentigines? Common forms of lentigo are due to exposure to ultraviolet radiation:  • Sun damage including sunburn   • Indoor tanning   • Phototherapy, especially photochemotherapy (PUVA)    Ionizing radiation, eg radiation therapy, can also cause lentigines. Several familial syndromes associated with widespread lentigines originate from mutations in Dionicio-MAP kinase, mTOR signaling and PTEN pathways. What is the treatment for lentigines? Most lentigines are left alone. Attempts to lighten them may not be successful.  The following approaches are used:  • SPF 50+ broad-spectrum sunscreen   • Hydroquinone bleaching cream   • Alpha hydroxy acids   • Vitamin C   • Retinoids • Azelaic acid   • Chemical peels  Individual lesions can be permanently removed using:  • Cryotherapy   • Intense pulsed light   • Pigment lasers    How can lentigines be prevented? Lentigines associated with exposure ultraviolet radiation can be prevented by very careful sun protection. Clothing is more successful at preventing new lentigines than are sunscreens. What is the outlook for lentigines? Lentigines usually persist. They may increase in number with age and sun exposure. Some in sun-protected sites may fade and disappear. 3. CALDERON ANGIOMAS    Physical Exam:  • Anatomic Location Affected:  trunk  • Morphological Description:  Scattered cherry red, 1-4 mm papules. • Pertinent Positives:  • Pertinent Negatives:    Assessment and Plan:  Based on a thorough discussion of this condition and the management approach to it (including a comprehensive discussion of the known risks, side effects and potential benefits of treatment), the patient (family) agrees to implement the following specific plan:  • Monitor for changes  • Benign, reassured    Assessment and Plan:    Cherry angioma, also known as Jackson Plaster spots, are benign vascular skin lesions. A "cherry angioma" is a firm red, blue or purple papule, 0.1-1 cm in diameter. When thrombosed, they can appear black in colour until evaluated with a dermatoscope when the red or purple colour is more easily seen. Cherry angioma may develop on any part of the body but most often appear on the scalp, face, lips and trunk. An angioma is due to proliferating endothelial cells; these are the cells that line the inside of a blood vessel. Angiomas can arise in early life or later in life; the most common type of angioma is a cherry angioma. Cherry angiomas are very common in males and females of any age or race. They are more noticeable in white skin than in skin of colour. They markedly increase in number from about the age of 36.  There may be a family history of similar lesions. Eruptive cherry angiomas have been rarely reported to be associated with internal malignancy. The cause of angiomas is unknown. Genetic analysis of cherry angiomas has shown that they frequently carry specific somatic missense mutations in the GNAQ and GNA11 (Q209H) genes, which are involved in other vascular and melanocytic proliferations. 4. XEROSIS ("DRY SKIN")    Physical Exam:  • Anatomic Location Affected:  diffuse  • Morphological Description:  xerosis  • Pertinent Positives:  • Pertinent Negatives:    Assessment and Plan:  Based on a thorough discussion of this condition and the management approach to it (including a comprehensive discussion of the known risks, side effects and potential benefits of treatment), the patient (family) agrees to implement the following specific plan:  • Use moisturizer like Eucerin,Cerave or Aveeno Cream 3 times a day for the dry skin   •   •    •     Dry skin refers to skin that feels dry to touch. Dry skin has a dull surface with a rough, scaly quality. The skin is less pliable and cracked. When dryness is severe, the skin may become inflamed and fissured. Although any body site can be dry, dry skin tends to affect the shins more than any other site. Dry skin is lacking moisture in the outer horny cell layer (stratum corneum) and this results in cracks in the skin surface. Dry skin is also called xerosis, xeroderma or asteatosis (lack of fat). It can affect males and females of all ages. There is some racial variability in water and lipid content of the skin. • Dry skin that starts in early childhood may be one of about 20 types of ichthyosis (fish-scale skin). There is often a family history of dry skin. • Dry skin is commonly seen in people with atopic dermatitis. • Nearly everyone > 60 years has dry skin. Dry skin that begins later may be seen in people with certain diseases and conditions.   • Postmenopausal women  • Hypothyroidism  • Chronic renal disease   • Malnutrition and weight loss   • Subclinical dermatitis   • Treatment with certain drugs such as oral retinoids, diuretics and epidermal growth factor receptor inhibitors      What is the treatment for dry skin? The mainstay of treatment of dry skin and ichthyosis is moisturisers/emollients. They should be applied liberally and often enough to:  • Reduce itch   • Improve the barrier function   • Prevent entry of irritants, bacteria   • Reduce transepidermal water loss. How can dry skin be prevented? Eliminate aggravating factors:  • Reduce the frequency of bathing. • A humidifier in winter and air conditioner in summer   • Compare having a short shower with a prolonged soak in a bath. • Use lukewarm, not hot, water. • Replace standard soap with a substitute such as a synthetic detergent cleanser, water-miscible emollient, bath oil, anti-pruritic tar oil, colloidal oatmeal etc.   • Apply an emollient liberally and often, particularly shortly after bathing, and when itchy. The drier the skin, the thicker this should be, especially on the hands. What is the outlook for dry skin? A tendency to dry skin may persist life-long, or it may improve once contributing factors are controlled.     Scribe Attestation    I,:  Arturo Malone MA am acting as a scribe while in the presence of the attending physician.:       I,:  Ismael Waters MD personally performed the services described in this documentation    as scribed in my presence.:

## 2023-07-31 DIAGNOSIS — F41.9 ANXIETY: Primary | ICD-10-CM

## 2023-07-31 RX ORDER — ALPRAZOLAM 0.5 MG/1
0.5 TABLET ORAL 2 TIMES DAILY PRN
Qty: 30 TABLET | Refills: 0 | Status: SHIPPED | OUTPATIENT
Start: 2023-07-31

## 2023-11-27 ENCOUNTER — TELEPHONE (OUTPATIENT)
Dept: OBGYN CLINIC | Facility: CLINIC | Age: 33
End: 2023-11-27

## 2023-11-27 NOTE — TELEPHONE ENCOUNTER
Patient left message on machine - wanted to check in. Has been getting period a lot more frequently, almost every 2 weeks or so. Wanted to discuss that.

## 2023-11-27 NOTE — TELEPHONE ENCOUNTER
Called patient back, patient said since October she has been bleeding every 2 weeks. Her period started October 9th then started again on October 26th. Then November 9th and again November 27th. Each time she bleeds for 5-6 days. She said it is like a moderate flow period. She is currently on norethindrone and takes it daily at the same time. She has been on it since August of 2022.  Pt scheduled for Friday at 11am.

## 2023-11-30 NOTE — PROGRESS NOTES
Assessment/Plan:       Problem List Items Addressed This Visit    None  Visit Diagnoses       Irregular menses    -  Primary    Relevant Medications    etonogestrel-ethinyl estradiol (NuvaRing) 0.12-0.015 MG/24HR vaginal ring    Other Relevant Orders    CBC and differential    TSH, 3rd generation with Free T4 reflex    Comprehensive metabolic panel    Iron Panel (Includes Ferritin, Iron Sat%, Iron, and TIBC)    Vaginal discharge        Relevant Orders    Genital Comprehensive Culture              Genital culture collected to rule out vaginal infection due to active vaginal discharge. Will treat based on results. Reviewed irregular menses with utilization of POP and cessation of breastfeeding. Discussed disruptions in HPO axis that can cause irregular periods. Discussed options to investigate irregular bleeding including blood work, TVUS, switching BC, and monitoring. Patient desires to have blood work done due to increased fatigue along with switching BC/monitoring. CBC/TSH/CMP/iron panel ordered. 5. Nuvaring ordered and reviewed proper utilization along with mechanism of action, risks, benefits, and side effects. Patient does not have any contraindications to estrogen therapy. 6. If blood work is abnormal and/or her bleeding continues to be irregular with use of Nuvaring, TVUS should be considered to rule out anatomical etiologies. 7. RTO in 3 months for med check. Subjective:      Patient ID: Mayco Kilpatrick is a 29 y.o. female. Patient presents to the office with menstrual concerns. LMP: 11/27/2023. She reports since October, she has had a period every 2 weeks. Flow ranges from light to moderate/normal flow. Her periods last 5-6 times with every bleed. She is currently taking POP for contraception and recent breastfeeding. She reports occasionally missing her time window for POP. She was previously on the [place] prior to pregnancy with regular periods every 28 days; lasting 5 days with normal flow. She just stopped breastfeeding in 2023. First period after breastfeeding was 2023. Hx significant for LTCS 22. Last pap 2023 NILM/HPV-. She has recent medication changes of increased dose of Prozac. Declines lifestyle changes/stressors/exercise/diet. Denies vaginal irritation/discharge/odor. Denies hx of STD/STI. She is sexually active with ; no new partners. ROS pertinent to HPI        The following portions of the patient's history were reviewed and updated as appropriate: She  has no past medical history on file. She   Patient Active Problem List    Diagnosis Date Noted    Anxiety 2023    Subcutaneous nodule of abdominal wall 2023    Elevated cortisol level 2021    Fatigue 2021    Sweating abnormality 2021     She  has a past surgical history that includes  section (2022) and TONSILECTOMY AND ADNOIDECTOMY. Her family history includes Cancer in her maternal grandfather; Coronary artery disease in her father and paternal grandfather; Diabetes type II in her mother; Heart block in her father; Hypertension in her mother; Melanoma in her maternal grandfather. She  reports that she has never smoked. She has never used smokeless tobacco. She reports current alcohol use. She reports that she does not use drugs. Current Outpatient Medications   Medication Sig Dispense Refill    ALPRAZolam (XANAX) 0.5 mg tablet Take 1 tablet (0.5 mg total) by mouth 2 (two) times a day as needed for anxiety 30 tablet 0    buPROPion (WELLBUTRIN SR) 100 mg 12 hr tablet Take 100 mg by mouth daily      busPIRone (BUSPAR) 10 mg tablet       etonogestrel-ethinyl estradiol (NuvaRing) 0.12-0.015 MG/24HR vaginal ring Insert vaginally and leave in place for 3 consecutive weeks, then remove for 1 week.  3 each 0    FLUoxetine (PROzac) 40 MG capsule Take 40 mg by mouth daily      Multiple Vitamins-Minerals (multivitamin with minerals) tablet Take 1 tablet by mouth daily      LINOLEIC ACID-SUNFLOWER OIL PO Take 1 tablet by mouth 2 (two) times a day (Patient not taking: Reported on 7/6/2023)      Prenatal Vit-Fe Fumarate-FA (PRENATAL VITAMIN PO) Take 1 tablet by mouth in the morning (Patient not taking: Reported on 12/1/2023)      sertraline (ZOLOFT) 100 mg tablet  (Patient not taking: Reported on 12/1/2023)       No current facility-administered medications for this visit. Current Outpatient Medications on File Prior to Visit   Medication Sig    ALPRAZolam (XANAX) 0.5 mg tablet Take 1 tablet (0.5 mg total) by mouth 2 (two) times a day as needed for anxiety    buPROPion (WELLBUTRIN SR) 100 mg 12 hr tablet Take 100 mg by mouth daily    busPIRone (BUSPAR) 10 mg tablet     FLUoxetine (PROzac) 40 MG capsule Take 40 mg by mouth daily    Multiple Vitamins-Minerals (multivitamin with minerals) tablet Take 1 tablet by mouth daily    [DISCONTINUED] norethindrone (MICRONOR) 0.35 MG tablet Take 1 tablet (0.35 mg total) by mouth daily    LINOLEIC ACID-SUNFLOWER OIL PO Take 1 tablet by mouth 2 (two) times a day (Patient not taking: Reported on 7/6/2023)    Prenatal Vit-Fe Fumarate-FA (PRENATAL VITAMIN PO) Take 1 tablet by mouth in the morning (Patient not taking: Reported on 12/1/2023)    sertraline (ZOLOFT) 100 mg tablet  (Patient not taking: Reported on 12/1/2023)     No current facility-administered medications on file prior to visit. She is allergic to penicillins. .    Review of Systems      Objective:      /72 (BP Location: Left arm, Patient Position: Sitting, Cuff Size: Standard)   Ht 5' 3" (1.6 m)   Wt 86.3 kg (190 lb 3.2 oz)   LMP 11/27/2023 (Exact Date)   BMI 33.69 kg/m²          Physical Exam  Vitals and nursing note reviewed. Constitutional:       General: She is not in acute distress. Appearance: Normal appearance. HENT:      Head: Normocephalic.    Eyes:      Conjunctiva/sclera: Conjunctivae normal.   Pulmonary:      Effort: Pulmonary effort is normal. No respiratory distress. Abdominal:      General: Abdomen is flat. Palpations: Abdomen is soft. Genitourinary:     General: Normal vulva. Exam position: Lithotomy position. Pubic Area: No rash or pubic lice. Labia:         Right: No rash or tenderness. Left: No rash or tenderness. Urethra: No urethral pain. Vagina: Vaginal discharge present. Cervix: Normal.      Uterus: Normal.       Adnexa: Right adnexa normal and left adnexa normal.      Comments: Thick white to brown discharge adhered to  vaginal walls and in posterior vaginal vault. Musculoskeletal:         General: Normal range of motion. Cervical back: Normal range of motion. Lymphadenopathy:      Lower Body: No right inguinal adenopathy. No left inguinal adenopathy. Skin:     General: Skin is warm and dry. Neurological:      Mental Status: She is alert and oriented to person, place, and time. Psychiatric:         Mood and Affect: Mood normal.         Behavior: Behavior normal.         Thought Content:  Thought content normal.         Judgment: Judgment normal.

## 2023-12-01 ENCOUNTER — APPOINTMENT (OUTPATIENT)
Dept: LAB | Facility: CLINIC | Age: 33
End: 2023-12-01
Payer: COMMERCIAL

## 2023-12-01 ENCOUNTER — OFFICE VISIT (OUTPATIENT)
Dept: OBGYN CLINIC | Facility: CLINIC | Age: 33
End: 2023-12-01
Payer: COMMERCIAL

## 2023-12-01 VITALS
SYSTOLIC BLOOD PRESSURE: 104 MMHG | DIASTOLIC BLOOD PRESSURE: 72 MMHG | BODY MASS INDEX: 33.7 KG/M2 | HEIGHT: 63 IN | WEIGHT: 190.2 LBS

## 2023-12-01 DIAGNOSIS — N92.6 IRREGULAR MENSES: ICD-10-CM

## 2023-12-01 DIAGNOSIS — N89.8 VAGINAL DISCHARGE: ICD-10-CM

## 2023-12-01 DIAGNOSIS — N92.6 IRREGULAR MENSES: Primary | ICD-10-CM

## 2023-12-01 LAB
ALBUMIN SERPL BCP-MCNC: 4.5 G/DL (ref 3.5–5)
ALP SERPL-CCNC: 50 U/L (ref 34–104)
ALT SERPL W P-5'-P-CCNC: 13 U/L (ref 7–52)
ANION GAP SERPL CALCULATED.3IONS-SCNC: 5 MMOL/L
AST SERPL W P-5'-P-CCNC: 13 U/L (ref 13–39)
BASOPHILS # BLD AUTO: 0.03 THOUSANDS/ÂΜL (ref 0–0.1)
BASOPHILS NFR BLD AUTO: 1 % (ref 0–1)
BILIRUB SERPL-MCNC: 0.65 MG/DL (ref 0.2–1)
BUN SERPL-MCNC: 14 MG/DL (ref 5–25)
CALCIUM SERPL-MCNC: 9.6 MG/DL (ref 8.4–10.2)
CHLORIDE SERPL-SCNC: 105 MMOL/L (ref 96–108)
CO2 SERPL-SCNC: 27 MMOL/L (ref 21–32)
CREAT SERPL-MCNC: 0.8 MG/DL (ref 0.6–1.3)
EOSINOPHIL # BLD AUTO: 0.11 THOUSAND/ÂΜL (ref 0–0.61)
EOSINOPHIL NFR BLD AUTO: 2 % (ref 0–6)
ERYTHROCYTE [DISTWIDTH] IN BLOOD BY AUTOMATED COUNT: 12.8 % (ref 11.6–15.1)
FERRITIN SERPL-MCNC: 20 NG/ML (ref 11–307)
GFR SERPL CREATININE-BSD FRML MDRD: 97 ML/MIN/1.73SQ M
GLUCOSE SERPL-MCNC: 89 MG/DL (ref 65–140)
HCT VFR BLD AUTO: 44.4 % (ref 34.8–46.1)
HGB BLD-MCNC: 14.3 G/DL (ref 11.5–15.4)
IMM GRANULOCYTES # BLD AUTO: 0.01 THOUSAND/UL (ref 0–0.2)
IMM GRANULOCYTES NFR BLD AUTO: 0 % (ref 0–2)
IRON SATN MFR SERPL: 20 % (ref 15–50)
IRON SERPL-MCNC: 67 UG/DL (ref 50–212)
LYMPHOCYTES # BLD AUTO: 1.62 THOUSANDS/ÂΜL (ref 0.6–4.47)
LYMPHOCYTES NFR BLD AUTO: 28 % (ref 14–44)
MCH RBC QN AUTO: 29.7 PG (ref 26.8–34.3)
MCHC RBC AUTO-ENTMCNC: 32.2 G/DL (ref 31.4–37.4)
MCV RBC AUTO: 92 FL (ref 82–98)
MONOCYTES # BLD AUTO: 0.4 THOUSAND/ÂΜL (ref 0.17–1.22)
MONOCYTES NFR BLD AUTO: 7 % (ref 4–12)
NEUTROPHILS # BLD AUTO: 3.63 THOUSANDS/ÂΜL (ref 1.85–7.62)
NEUTS SEG NFR BLD AUTO: 62 % (ref 43–75)
NRBC BLD AUTO-RTO: 0 /100 WBCS
PLATELET # BLD AUTO: 284 THOUSANDS/UL (ref 149–390)
PMV BLD AUTO: 11.4 FL (ref 8.9–12.7)
POTASSIUM SERPL-SCNC: 4.2 MMOL/L (ref 3.5–5.3)
PROT SERPL-MCNC: 7.2 G/DL (ref 6.4–8.4)
RBC # BLD AUTO: 4.81 MILLION/UL (ref 3.81–5.12)
SODIUM SERPL-SCNC: 137 MMOL/L (ref 135–147)
TIBC SERPL-MCNC: 343 UG/DL (ref 250–450)
TSH SERPL DL<=0.05 MIU/L-ACNC: 1.33 UIU/ML (ref 0.45–4.5)
UIBC SERPL-MCNC: 276 UG/DL (ref 155–355)
WBC # BLD AUTO: 5.8 THOUSAND/UL (ref 4.31–10.16)

## 2023-12-01 PROCEDURE — 80053 COMPREHEN METABOLIC PANEL: CPT

## 2023-12-01 PROCEDURE — 87070 CULTURE OTHR SPECIMN AEROBIC: CPT

## 2023-12-01 PROCEDURE — 83540 ASSAY OF IRON: CPT

## 2023-12-01 PROCEDURE — 82728 ASSAY OF FERRITIN: CPT

## 2023-12-01 PROCEDURE — 84443 ASSAY THYROID STIM HORMONE: CPT

## 2023-12-01 PROCEDURE — 83550 IRON BINDING TEST: CPT

## 2023-12-01 PROCEDURE — 36415 COLL VENOUS BLD VENIPUNCTURE: CPT

## 2023-12-01 PROCEDURE — 99213 OFFICE O/P EST LOW 20 MIN: CPT

## 2023-12-01 PROCEDURE — 85025 COMPLETE CBC W/AUTO DIFF WBC: CPT

## 2023-12-01 RX ORDER — ETONOGESTREL AND ETHINYL ESTRADIOL VAGINAL .015; .12 MG/D; MG/D
RING VAGINAL
Qty: 3 EACH | Refills: 0 | Status: SHIPPED | OUTPATIENT
Start: 2023-12-01

## 2023-12-01 RX ORDER — MULTIVIT-MIN/IRON FUM/FOLIC AC 7.5 MG-4
1 TABLET ORAL DAILY
COMMUNITY

## 2023-12-01 RX ORDER — FLUOXETINE HYDROCHLORIDE 40 MG/1
40 CAPSULE ORAL DAILY
COMMUNITY
Start: 2023-11-27

## 2023-12-03 LAB — BACTERIA GENITAL AEROBE CULT: NORMAL

## 2023-12-05 LAB — BACTERIA GENITAL AEROBE CULT: NORMAL

## 2024-01-02 ENCOUNTER — AMB VIDEO VISIT (OUTPATIENT)
Dept: OTHER | Facility: HOSPITAL | Age: 34
End: 2024-01-02

## 2024-01-02 VITALS — RESPIRATION RATE: 16 BRPM | HEART RATE: 76 BPM

## 2024-01-02 DIAGNOSIS — H92.01 OTALGIA OF RIGHT EAR: ICD-10-CM

## 2024-01-02 DIAGNOSIS — R05.1 ACUTE COUGH: ICD-10-CM

## 2024-01-02 DIAGNOSIS — J01.00 ACUTE NON-RECURRENT MAXILLARY SINUSITIS: Primary | ICD-10-CM

## 2024-01-02 PROBLEM — L74.9 SWEATING ABNORMALITY: Status: RESOLVED | Noted: 2021-04-08 | Resolved: 2024-01-02

## 2024-01-02 PROBLEM — R79.89 ELEVATED CORTISOL LEVEL: Status: RESOLVED | Noted: 2021-04-08 | Resolved: 2024-01-02

## 2024-01-02 PROBLEM — R22.2 SUBCUTANEOUS NODULE OF ABDOMINAL WALL: Status: RESOLVED | Noted: 2023-05-03 | Resolved: 2024-01-02

## 2024-01-02 PROCEDURE — ECARE PR SL URGENT CARE VIRTUAL VISIT: Performed by: PHYSICIAN ASSISTANT

## 2024-01-02 RX ORDER — CIPROFLOXACIN 500 MG/1
500 TABLET, FILM COATED ORAL EVERY 12 HOURS SCHEDULED
Qty: 14 TABLET | Refills: 0 | Status: SHIPPED | OUTPATIENT
Start: 2024-01-02 | End: 2024-01-09

## 2024-01-02 NOTE — CARE ANYWHERE EVISITS
Visit Summary for Sara Cruz - Gender: Female - Date of Birth: 1990  Date: [id number] - Duration: 9 minutes  Patient: Sara Curz  Provider: Shannon Severino PA-C    Patient Contact Information  Address  [address] [place] [address] [address]  [place]; [place] [address]  [phone]    Visit Topics  Ongoing cold symptoms [Added By: Self - 2024-01-02]    Triage Questions   What is your current physical address in the event of a medical emergency? Answer []  Are you allergic to any medications? Answer []  Are you now or could you be pregnant? Answer []  Do you have any immune system compromise or chronic lung   disease? Answer []  Do you have any vulnerable family members in the home (infant, pregnant, cancer, elderly)? Answer []     Conversation Transcripts  [0A][0A] [Notification] You are connected with Shannon Severino PA-C, Urgent Care Specialist.[0A][Notification] Sara Cruz is located in [place].[0A][Notification] Sara Cruz has shared health history...[0A]    Diagnosis  Acute maxillary sinusitis    Procedures  Value: [id number] Code: CPT-4 UNLISTED E&M SERVICE    Medications Prescribed    No prescriptions ordered    Electronically signed by: Severino PA-C, Shannon(NPI [id number])

## 2024-01-02 NOTE — PATIENT INSTRUCTIONS
"As discussed, sinus infections are typically viral and will get better on their own in 7-10 days. You should try symptomatic relief in the meantime with OTC (Claritin or Zyrtec), Afrin up to 3 days then switch to Flonase, and Sudafed(behind the counter) and mucinex. You can also try sinus rinses with a neti pot or nasal lavage (be sure to use distilled water.) Sleep with a cool mist humidifier at your bedside. If your symptoms do not improve after 7-10 days, you may need antibiotics because it is more likely to be bacterial at that point.  Follow-up with your primary care physician for recheck in 2-3 business days. Go to the ER for sudden severe headache, high fevers, change in vision, seizure activity or anything else that is concerning.    Care Anywhere Phone number is [phone] if you need assistance or have further questions  note in \"Letters\" section of [employer] tate. Can print if opened from a web browser    "

## 2024-01-02 NOTE — PROGRESS NOTES
Required Documentation:  Encounter provider Shannon D Severino, PA-C    Provider located at [facility]  VIRTUAL CARE   [address] [facility] [address]    Identify all parties in room with patient during virtual visit:  parent(s)-permission granted or assumed due to patient age    The patient was identified by name and date of birth. Sara Cruz was informed that this is a telemedicine visit and that the visit is being conducted through the [employer] ([employer] platform. She agrees to proceed..  My office door was closed. No one else was in the room.  She acknowledged consent and understanding of privacy and security of the video platform. The patient has agreed to participate and understands they can discontinue the visit at any time.    Verification of patient location:    Patient is located at home in the following state in which I hold an active license [place]    Patient is aware this is a billable service.     Reason for visit is No chief complaint on file.       Subjective  HPI   Pt reports  started feeling unwell with fatigue, congestion, rhinorrhea, mild sore throat. Sx worsened about 2 days ago. Maxillary sinus pressure. R parietal HA. Intermittent R ear pain, cough productive of yellow-green sputum. Subjective fevers. Tried rest, tylenol/advil PRN, nasal spray with some relief. Denies PC, SOB, ear drainage, NVD.    Past Medical History:   Diagnosis Date    Elevated cortisol level        Past Surgical History:   Procedure Laterality Date     SECTION  2022    TONSILECTOMY AND ADNOIDECTOMY          Allergies   Allergen Reactions    Penicillins Hives       Review of Systems   Constitutional:  Negative for fever.   HENT:  Positive for congestion, ear pain, rhinorrhea, sinus pressure and sinus pain. Negative for nosebleeds.    Eyes:  Negative for redness.   Respiratory:  Positive for cough. Negative for shortness of breath.    Cardiovascular:   Negative for chest pain.   Gastrointestinal:  Negative for blood in stool.   Genitourinary:  Negative for hematuria.   Musculoskeletal:  Negative for gait problem.   Skin:  Negative for rash.   Neurological:  Negative for seizures.   Psychiatric/Behavioral:  Negative for behavioral problems.        Video Exam    Vitals:    01/02/24 1022   Pulse: 76   Resp: 16       Physical Exam  Constitutional:       General: She is not in acute distress.     Appearance: Normal appearance. She is overweight. She is not toxic-appearing.   HENT:      Head: Normocephalic and atraumatic.      Ears:      Comments: No swelling or redness or ttp of mastoid     Nose: No rhinorrhea.      Comments: Sounds very congested     Mouth/Throat:      Mouth: Mucous membranes are moist.   Eyes:      Conjunctiva/sclera: Conjunctivae normal.      Comments: glasses   Cardiovascular:      Rate and Rhythm: Normal rate.   Pulmonary:      Effort: Pulmonary effort is normal. No respiratory distress.      Breath sounds: No wheezing (no gross audible wheeze through computer).      Comments: Speaking in complete sentences  Musculoskeletal:      Cervical back: Normal range of motion.   Skin:     Findings: No rash (on face or neck).   Neurological:      Mental Status: She is alert.      Cranial Nerves: No dysarthria or facial asymmetry.   Psychiatric:         Mood and Affect: Mood normal.         Behavior: Behavior normal.         Visit Time  Total Visit Duration: 10 minutes    Assessment/Plan:    Diagnoses and all orders for this visit:    Acute non-recurrent maxillary sinusitis  -     ciprofloxacin (CIPRO) 500 mg tablet; Take 1 tablet (500 mg total) by mouth every 12 (twelve) hours for 7 days    Otalgia of right ear  -     ciprofloxacin (CIPRO) 500 mg tablet; Take 1 tablet (500 mg total) by mouth every 12 (twelve) hours for 7 days    Acute cough  -     ciprofloxacin (CIPRO) 500 mg tablet; Take 1 tablet (500 mg total) by mouth every 12 (twelve) hours for 7  "days        Patient Instructions   As discussed, sinus infections are typically viral and will get better on their own in 7-10 days. You should try symptomatic relief in the meantime with OTC (Claritin or Zyrtec), Afrin up to 3 days then switch to Flonase, and Sudafed(behind the counter) and mucinex. You can also try sinus rinses with a neti pot or nasal lavage (be sure to use distilled water.) Sleep with a cool mist humidifier at your bedside. If your symptoms do not improve after 7-10 days, you may need antibiotics because it is more likely to be bacterial at that point.  Follow-up with your primary care physician for recheck in 2-3 business days. Go to the ER for sudden severe headache, high fevers, change in vision, seizure activity or anything else that is concerning.    [facility] Phone number is [phone] if you need assistance or have further questions  note in \"Letters\" section of [employer] tate. Can print if opened from a web browser    "

## 2024-02-23 DIAGNOSIS — N92.6 IRREGULAR MENSES: ICD-10-CM

## 2024-02-23 RX ORDER — ETONOGESTREL AND ETHINYL ESTRADIOL VAGINAL RING .015; .12 MG/D; MG/D
RING VAGINAL
Qty: 3 EACH | Refills: 1 | Status: SHIPPED | OUTPATIENT
Start: 2024-02-23

## 2024-06-04 ENCOUNTER — OFFICE VISIT (OUTPATIENT)
Dept: FAMILY MEDICINE CLINIC | Facility: CLINIC | Age: 34
End: 2024-06-04
Payer: COMMERCIAL

## 2024-06-04 VITALS
SYSTOLIC BLOOD PRESSURE: 110 MMHG | BODY MASS INDEX: 31.54 KG/M2 | HEIGHT: 63 IN | TEMPERATURE: 97.7 F | DIASTOLIC BLOOD PRESSURE: 68 MMHG | OXYGEN SATURATION: 98 % | WEIGHT: 178 LBS | HEART RATE: 90 BPM

## 2024-06-04 DIAGNOSIS — Z00.00 ANNUAL PHYSICAL EXAM: Primary | ICD-10-CM

## 2024-06-04 PROCEDURE — 99395 PREV VISIT EST AGE 18-39: CPT | Performed by: NURSE PRACTITIONER

## 2024-06-04 RX ORDER — BUPROPION HYDROCHLORIDE 300 MG/1
300 TABLET ORAL DAILY
COMMUNITY
Start: 2024-03-13

## 2024-06-04 NOTE — PROGRESS NOTES
Adult Annual Physical  Name: Sara Cruz      : 1990      MRN: [id number]  Encounter Provider: KOURTNEY Ortega  Encounter Date: 2024   Encounter department: [facility]    Assessment & Plan   1. Annual physical exam    Immunizations and preventive care screenings were discussed with patient today. Appropriate education was printed on patient's after visit summary.    Counseling:  Alcohol/drug use: discussed moderation in alcohol intake, the recommendations for healthy alcohol use, and avoidance of illicit drug use.  Dental Health: discussed importance of regular tooth brushing, flossing, and dental visits.  Exercise: the importance of regular exercise/physical activity was discussed. Recommend exercise 3-5 times per week for at least 30 minutes.     Depression Screening and Follow-up Plan: Patient was screened for depression during today's encounter. They screened negative with a PHQ-2 score of 0.        History of Present Illness     Adult Annual Physical:  Patient presents for annual physical. 34 y.o.female presenting for her annual comprehensive physical exam. She reports that she is doing well on her mental health medications and discussing with her counselor about how to mange medications if she gets pregnant again. She also reports that she would like information of where she could go to receive women's pelvic therapy if she does get pregnant again but she needed during her last pregnancy.  .     Diet and Physical Activity:  - Diet/Nutrition: well balanced diet and consuming 3-5 servings of fruits/vegetables daily.  - Exercise: moderate cardiovascular exercise, 1-2 times a week on average and 30-60 minutes on average.    Depression Screening:  - PHQ-2 Score: 0    General Health:  - Sleep: sleeps well and 7-8 hours of sleep on average.  - Hearing: normal hearing right ear and normal hearing left ear.  - Vision: no vision problems.  - Dental: regular dental visits and  brushes teeth twice daily.    /GYN Health:  - Follows with GYN: yes.   - Menopause: premenopausal.   - History of STDs: no  - Contraception: barrier methods.      Advanced Care Planning:  - Has an advanced directive?: no    - Has a durable medical POA?: no    - ACP document given to patient?: no      Review of Systems   Constitutional:  Negative for activity change, appetite change and unexpected weight change.   HENT:  Negative for dental problem, ear pain, hearing loss, nosebleeds, sneezing, sore throat, tinnitus and trouble swallowing.    Eyes:  Negative for visual disturbance.   Respiratory:  Negative for cough, chest tightness, shortness of breath and wheezing.    Cardiovascular:  Negative for chest pain, palpitations and leg swelling.   Gastrointestinal:  Negative for abdominal distention, abdominal pain, constipation, diarrhea and nausea.   Endocrine: Negative for polydipsia, polyphagia and polyuria.   Genitourinary: Negative.    Musculoskeletal:  Negative for arthralgias, back pain, myalgias and neck pain.   Skin:  Negative for color change and rash.   Allergic/Immunologic: Negative for environmental allergies.   Neurological:  Negative for dizziness, weakness, light-headedness and headaches.   Hematological: Negative.    Psychiatric/Behavioral: Negative.  Negative for dysphoric mood and sleep disturbance. The patient is not nervous/anxious.      Current Outpatient Medications on File Prior to Visit   Medication Sig Dispense Refill   • ALPRAZolam (XANAX) 0.5 mg tablet Take 1 tablet (0.5 mg total) by mouth 2 (two) times a day as needed for anxiety 30 tablet 0   • buPROPion (WELLBUTRIN XL) 300 mg 24 hr tablet Take 300 mg by mouth daily     • busPIRone (BUSPAR) 10 mg tablet      • etonogestrel-ethinyl estradiol (NuvaRing) 0.12-0.015 MG/24HR vaginal ring INSERT 1 RING VAGINALLY AS DIRECTED. REMOVE AFTER 3 WEEKS & WAIT 7 DAYS BEFORE INSERTING A NEW RING 3 each 1   • FLUoxetine (PROzac) 40 MG capsule Take 40 mg  "by mouth daily     • Multiple Vitamins-Minerals (multivitamin with minerals) tablet Take 1 tablet by mouth daily       No current facility-administered medications on file prior to visit.        Objective     /68   Pulse 90   Temp 97.7 °F (36.5 °C)   Ht 5' 3\" (1.6 m)   Wt 80.7 kg (178 lb)   LMP 05/28/2024 (Exact Date)   SpO2 98%   Breastfeeding No   BMI 31.53 kg/m² (Reviewed)    Physical Exam  Vitals reviewed.   Constitutional:       General: She is not in acute distress.     Appearance: Normal appearance. She is well-developed and well-groomed. She is not ill-appearing.   HENT:      Head: Normocephalic and atraumatic.      Right Ear: External ear normal.      Left Ear: External ear normal.      Nose: Nose normal.      Mouth/Throat:      Lips: Pink.      Mouth: Mucous membranes are moist.      Pharynx: Oropharynx is clear.   Eyes:      General: Lids are normal.      Extraocular Movements: Extraocular movements intact.      Conjunctiva/sclera: Conjunctivae normal.      Pupils: Pupils are equal, round, and reactive to light.   Neck:      Thyroid: No thyromegaly or thyroid tenderness.      Trachea: Trachea and phonation normal.   Cardiovascular:      Rate and Rhythm: Normal rate and regular rhythm.      Pulses:           Radial pulses are 2+ on the right side and 2+ on the left side.        Dorsalis pedis pulses are 2+ on the right side and 2+ on the left side.        Posterior tibial pulses are 2+ on the right side and 2+ on the left side.      Heart sounds: Normal heart sounds. No murmur heard.  Pulmonary:      Effort: Pulmonary effort is normal.      Breath sounds: Normal breath sounds.   Abdominal:      General: Abdomen is flat. Bowel sounds are normal. There is no distension.      Palpations: Abdomen is soft.      Tenderness: There is no abdominal tenderness.   Musculoskeletal:         General: Normal range of motion.      Cervical back: Neck supple.      Right lower leg: No edema.      Left lower " leg: No edema.   Skin:     General: Skin is warm and dry.      Capillary Refill: Capillary refill takes less than 2 seconds.   Neurological:      General: No focal deficit present.      Mental Status: She is alert and oriented to person, place, and time.      Cranial Nerves: Cranial nerves 2-12 are intact.   Psychiatric:         Mood and Affect: Mood normal.         Behavior: Behavior normal. Behavior is cooperative.       Administrative Statements {Disappearing Hyperlinks I  [facility] Service [id number]}

## 2024-06-19 NOTE — PROGRESS NOTES
PT Evaluation     Today's date: 2024  Patient name: Sara Cruz  : 1990  MRN: [id number]  Referring provider: Shama Betancourt, STEPHANIE  Dx:   Encounter Diagnosis     ICD-10-CM    1. High-tone pelvic floor dysfunction in female  M62.89           Start Time: 1050  Stop Time: 1140  Total time in clinic (min): 50 minutes    Assessment    Assessment details: The patient is a 34 year old female with complaints of chronic right hip/ITB pain and history of high tone pelvic floor postpartum. Patient is preparing for her second pregnancy and would like to address these concerns. She presents with mild pelvic floor muscle tone, delayed muscle relaxation, core weakness. She would benefit from pelvic floor physical therapy to help reduce/manage symptoms, address impairments and maximize function and quality of life upon discharge. Therapeutic activities performed upon examination included education regarding pelvic floor anatomy, explanation of exam technique, explanation of exam findings and discussion of treatment plan as well as expectations of the patient to emphasize the importance of compliance and adherence to physical therapy visits. She will be given updated HEP throughout episode of care.     Pelvic floor verbal consent and written consent signed and in chart    Education provided today:   Pelvic floor anatomy and function  Physiology/relationship of abdominal canister and pelvis/pelvic organs/pelvic floor muscles  Diaphragm and Diaphragmatic breathing  Bowel and Bladder anatomy and function  PT exam and course of treatment                Goals  ST. The patient will maintain bilateral hip/pelvic girdle strength to prepare for pregnancy.  2. The patient will maintain normal hip range of motion and mobility to prepare for pregnancy.    LT. The patient will be independent with HEP upon discharge to help manage her symptoms and maintain her activity levels upon discharge.   2. The patient will report 50%  reduction in right hip pain upon d/c.  3. The patient will demonstrate improved coordination and lengthening of PFM upon d/c.      Plan  Patient would benefit from: skilled physical therapy  Planned modality interventions: biofeedback    Planned therapy interventions: abdominal trunk stabilization, home exercise program, therapeutic exercise, therapeutic activities, stretching, strengthening, patient/caregiver education, neuromuscular re-education and manual therapy    Frequency: Every other week  Duration in weeks: 5  Plan of Care beginning date: 2024  Plan of Care expiration date: 2024  Treatment plan discussed with: patient        PT Pelvic Floor Subjective:   History of Present Illness:   Patient had her first pregnancy and delivery 2 years ago. She reports during that pregnancy she was having hip pain and received PT for ITB syndrome. She can continue to get hip/ITB pain with prolonged walking/standing at the end of the day. After this delivery she went to pelvic floor PT for pain with intercourse. She was told her hips are very misaligned and had high tone pelvic floor. Dyspareunia has resolved. She is planning on getting pregnant soon and would like to prepare for pregnancy in terms of managing/preventing these symptoms.     OBGYN: [facility] caring for women       Social Support:     Lives with:  Young children    Relationship status: /committed    Work status: employed part time (nurse at [employer])  Diet and Exercise:      Exercise type: biking and swimming    Exercise frequency: once to twice a week    [employer]   OB/ gyn History    Gestational History:     Prior Pregnancy: Yes      Number of prior pregnancies: 1    Delivery Type:  section      Number of caesarian sections: 1    Delivery Complications:  22 month old daughter Yeny - 8 lb 36 oz   Had only progressed to 3-4 cm and was not progressing so had emergency     Menstrual History:      Menstrual  "irregularities regular menses    Painful periods:  No difficulty managing menstrual pain    Tolerates tampons: yes    Birth control: contraception    Birth control method: a vaginal ring  Bladder Function:     Voiding Difficulties negative for: urgency, frequent urination and incomplete emptying       Voiding Difficulties comments:     Voiding frequency: every 1-2 hours and every 3-4 hours    Urinary leakage: no urine leakage    Nocturia (episodes per night): 1    Painful urination: No      Intake (ounces):     Intake (ounces) comment: Water: 64 oz   Coffee: 1 cup caffeinated   [place] water: 2 cans/day  Bowel Function:     Bowel frequency: daily and every 2 days  Sexual Function:     Sexually Active:  Sexually active    Pain during intercourse: No  Has a history of pain with intercourse postpartum   Had pelvic floor therapy for 2 months   Biofeedback - high resting tone:  Pain:     Location:  Will notice right lateral hip/ITB pain after a long shift at work    Aggravating factors:  Prolonged positions and walking (prolonged walking/work shift)    Relieving factors:  Rest  Patient Goals:     Other patient goals:  Preparing for pregnancy, reduce hip pain, work on core strength      Objective       Abdominal Assessment:        Diastatis   Diastasis recti present? no  3\" above umbilicus (# fingers): 2  Umbilicus (# fingers): 2  3\" below umbilicus (# fingers): 2  no tenderness at linea alba     Skin inspection:   scars present.   Number of scars: 1  Scar 1 location: low transverse . Sensitivity level low Restriction level low       General Perineum Exam:   perineum intact.   Positive for no pelvic organ prolapse at rest.   Negative for swelling and perianal erythema    Visual Inspection of Perineum:   Excursion of perineal body in cephalad direction with contraction of pelvic floor muscles (PFM): weak  Excursion of perineal body in caudal direction with relaxation of pelvic floor muscles (PFM): weak  Sensation: " "intact    Pelvic Organ Prolapse   At rest: none  With bearing down: none  Perineal body inspection: elevated        Pelvic Floor Muscle Exam:     Muscle Contraction: well isolated    Pelvic floor muscle relaxation is delayed and incomplete.         PERFECT Score   Power right: 2+/5   Power left: 2+/5   Endurance (seconds to max): 5     Perfect Score: No tenderness but mild muscle tension noted throughout   Delayed muscle relaxation and difficulty with pelvic floor lengthening - breath holding      pelvic floor exam consent given by patient    Pelvic exam completed: vaginally     Graphical documentation:     Has a history of pain with intercourse postpartum   Had pelvic floor therapy for 2 months   Biofeedback - high resting tone             Precautions:  x1  Biofeedback Codes:   Goals: prepare for pregnancy, R hip/ITB pain, history of high tone PFM postpartum      Manuals             PFM releases + manual biofeedback nv            Assess hip strength **            R glute STM **            Neuro Re-Ed             SEMG Biofeedback **            Diaphragmatic breathing nv            TA ADIM nv            DKTC w DB nv            Jennifer pose w DB nv            PF elevators             PFMC Slow Holds             PFMC Quick Flicks             Seated PFMC             Standing PFMC             Ther Ex             Piriformis st nv            ITB st             TA + marches nv            TA + leg ext nv            Dead bugs nv            Bridging nv            Bridge marches             SL bridge w pulses             TA + SLR             TA + clamshells 3\"up/3\"down nv                         Quadruped TA + PFMC             Quadruped alt leg exts             Bird dogs                          PFMC + TB low row             PFMC + paloff press             TB trunk rotations                          Ther Activity             Education Done            SL STS             Squats             PFMC + sit to stand          "    PFMC + lifting up             PFMC + lifting from ground             PFMC + step ups

## 2024-06-20 ENCOUNTER — OFFICE VISIT (OUTPATIENT)
Dept: PHYSICAL THERAPY | Facility: REHABILITATION | Age: 34
End: 2024-06-20
Payer: COMMERCIAL

## 2024-06-20 DIAGNOSIS — M62.89 HIGH-TONE PELVIC FLOOR DYSFUNCTION IN FEMALE: Primary | ICD-10-CM

## 2024-06-20 PROCEDURE — 97161 PT EVAL LOW COMPLEX 20 MIN: CPT | Performed by: PHYSICAL THERAPIST

## 2024-06-20 PROCEDURE — 97530 THERAPEUTIC ACTIVITIES: CPT | Performed by: PHYSICAL THERAPIST

## 2024-06-28 ENCOUNTER — APPOINTMENT (OUTPATIENT)
Dept: PHYSICAL THERAPY | Facility: REHABILITATION | Age: 34
End: 2024-06-28
Payer: COMMERCIAL

## 2024-07-10 ENCOUNTER — APPOINTMENT (OUTPATIENT)
Dept: PHYSICAL THERAPY | Facility: REHABILITATION | Age: 34
End: 2024-07-10
Payer: COMMERCIAL

## 2024-07-23 ENCOUNTER — OFFICE VISIT (OUTPATIENT)
Dept: PHYSICAL THERAPY | Facility: REHABILITATION | Age: 34
End: 2024-07-23
Payer: COMMERCIAL

## 2024-07-23 DIAGNOSIS — M62.89 HIGH-TONE PELVIC FLOOR DYSFUNCTION IN FEMALE: Primary | ICD-10-CM

## 2024-07-23 PROCEDURE — 97140 MANUAL THERAPY 1/> REGIONS: CPT | Performed by: PHYSICAL THERAPIST

## 2024-07-23 PROCEDURE — 97110 THERAPEUTIC EXERCISES: CPT | Performed by: PHYSICAL THERAPIST

## 2024-07-23 NOTE — PROGRESS NOTES
"Daily Note     Today's date: 2024  Patient name: Sara Cruz  : 1990  MRN: [id number]  Referring provider: Cathy Griffin CRNP  Dx:   Encounter Diagnosis     ICD-10-CM    1. High-tone pelvic floor dysfunction in female  M62.89           Start Time: 1050  Stop Time: 1143  Total time in clinic (min): 53 minutes    Subjective: Patient reports no changes since IE. She took one month break from PT due to scheduling conflicts/vacation.       Objective: See treatment diary below  Objective     Strength/Myotome Testing     Left Hip   Planes of Motion   Flexion: 5  Extension: 5  Abduction: 5  External rotation: 5  Internal rotation: 5    Right Hip   Planes of Motion   Flexion: 5  Extension: 4  Abduction: 4  External rotation: 4  Internal rotation: 4    Tests     Right Hip   Negative modified Jennifer.         Assessment: Tolerated treatment well. Patient demonstrated fatigue post treatment, exhibited good technique with therapeutic exercises, and would benefit from continued PT. Initiated plan of care this visit with a focus on hip/pelvic girdle strengthening and stretching with overall good tolerance, evident muscle fatigue noted. Provided patient with updated HEP.      Plan: Continue per plan of care.      Precautions:  x1  Biofeedback Codes:   Goals: prepare for pregnancy, R hip/ITB pain, history of high tone PFM postpartum      Manuals            PFM releases + manual biofeedback nv nv           Assess hip strength ** see above           R glute STM ** Done           Neuro Re-Ed             SEMG Biofeedback **            Diaphragmatic breathing nv            TA ADIM nv 10x           DKTC w DB nv nv           Jennifer pose w DB nv nv           PF elevators             PFMC Slow Holds             PFMC Quick Flicks             Seated PFMC             Standing PFMC             Ther Ex             Piriformis st nv 3x20\" b/l           ITB st             TA + marches nv            TA + leg ext nv 10x " "          Dead bugs nv 2x10           Bridging nv BTB 3x10           Bridge marches  2x10           SL bridge w pulses             TA + SLR             TA + clamshells 3\"up/3\"down nv BTB 2x10 b/l                        Quadruped TA + PFMC             Quadruped alt leg exts             Bird dogs                          PFMC + TB low row             PFMC + paloff press             TB trunk rotations                          Ther Activity             Education Done            SL STS             Squats             PFMC + sit to stand             PFMC + lifting up             PFMC + lifting from ground             PFMC + step ups                                   "

## 2024-07-25 ENCOUNTER — OFFICE VISIT (OUTPATIENT)
Age: 34
End: 2024-07-25
Payer: COMMERCIAL

## 2024-07-25 VITALS
HEIGHT: 63 IN | BODY MASS INDEX: 31.54 KG/M2 | HEART RATE: 69 BPM | DIASTOLIC BLOOD PRESSURE: 79 MMHG | WEIGHT: 178 LBS | SYSTOLIC BLOOD PRESSURE: 117 MMHG

## 2024-07-25 DIAGNOSIS — L60.0 INGROWN TOENAIL OF RIGHT FOOT: Primary | ICD-10-CM

## 2024-07-25 DIAGNOSIS — L03.031 PARONYCHIA OF GREAT TOE, RIGHT: ICD-10-CM

## 2024-07-25 PROCEDURE — 99203 OFFICE O/P NEW LOW 30 MIN: CPT | Performed by: STUDENT IN AN ORGANIZED HEALTH CARE EDUCATION/TRAINING PROGRAM

## 2024-07-25 PROCEDURE — 11750 EXCISION NAIL&NAIL MATRIX: CPT | Performed by: STUDENT IN AN ORGANIZED HEALTH CARE EDUCATION/TRAINING PROGRAM

## 2024-07-25 NOTE — PROGRESS NOTES
"[facility] Podiatric Medicine and Surgery Office Visit    ASSESSMENT     Diagnoses and all orders for this visit:    Ingrown toenail of right foot  -     Nail removal    Paronychia of great toe, right         Problem List Items Addressed This Visit    None  Visit Diagnoses       Ingrown toenail of right foot    -  Primary    Relevant Orders    Nail removal    Paronychia of great toe, right              PLAN  -Nail avulsion performed as below:  -Nail bed was dressed with bacitracin, DSD, 1 inch Coban  -Patient instructed to take bandage off in 24 hours, wash area lightly with warm soap and water, dry area thoroughly, apply bacitracin and Band-Aid daily x10 days.  -Patient instructed to call our office for an earlier appointment should any extreme pain, redness, swelling, purulent drainage present.    Nail removal    Date/Time: 7/25/2024 8:00 AM    Performed by: Ramiro Dhaliwal DPM  Authorized by: Ramiro Dhaliwal DPM    Patient location:  Clinic  Indications / Diagnosis:  Ingrown toenail  Universal Protocol:  Consent: Verbal consent obtained.  Risks and benefits: risks, benefits and alternatives were discussed  Consent given by: patient  Time out: Immediately prior to procedure a \"time out\" was called to verify the correct patient, procedure, equipment, support staff and site/side marked as required.  Patient understanding: patient states understanding of the procedure being performed  Site marked: the operative site was marked  Patient identity confirmed: verbally with patient    Pre-procedure details:     Skin preparation:  Alcohol and Betadine  Anesthesia (see MAR for exact dosages):     Anesthesia method:  Local infiltration    Local anesthetic:  Lidocaine 1% w/o epi  Nail Removal:     Nail removed:  Partial    Nail side:  Lateral    Nail bed sutured: no    Ingrown nail:     Wedge excision of skin: no      Nail matrix removed or ablated:  Partial  Post-procedure details:     Dressing:  4x4 sterile gauze, antibiotic " "ointment and gauze roll    Patient tolerance of procedure:  Tolerated well, no immediate complications     SUBJECTIVE    Chief Complaint:  Right big toenail issue     Patient ID: Sara Cruz     7/25/2024: Sara is a pleasant 34-year-old female who presents today with right big toe pain.  She states that this started a few days ago.  She states that she has had multiple toenail issues to her bilateral feet over the years.  She states the last time her right big toenail was taking care of was about 2 years ago.  She states that at home, her mother tried to cut out the ingrown piece, however this morning at 4 AM she was woken up with pain.  She wanted to get this taken care of.        The following portions of the patient's history were reviewed and updated as appropriate: allergies, current medications, past family history, past medical history, past social history, past surgical history and problem list.    Review of Systems   Constitutional: Negative.    HENT: Negative.     Respiratory: Negative.     Cardiovascular: Negative.    Gastrointestinal: Negative.    Musculoskeletal: Negative.    Skin:  Positive for color change.   Neurological: Negative.          OBJECTIVE      /79   Pulse 69   Ht 5' 3\" (1.6 m)   Wt 80.7 kg (178 lb)   BMI 31.53 kg/m²        Physical Exam  Constitutional:       Appearance: Normal appearance.   HENT:      Head: Normocephalic and atraumatic.   Eyes:      General:         Right eye: No discharge.         Left eye: No discharge.   Cardiovascular:      Rate and Rhythm: Normal rate and regular rhythm.      Pulses:           Dorsalis pedis pulses are 2+ on the right side and 2+ on the left side.        Posterior tibial pulses are 2+ on the right side and 2+ on the left side.   Pulmonary:      Effort: Pulmonary effort is normal.      Breath sounds: Normal breath sounds.   Skin:     General: Skin is warm.      Capillary Refill: Capillary refill takes less than 2 seconds.   Neurological: "      Sensory: Sensation is intact. No sensory deficit.         Vascular:  -DP and PT pulses intact b/l  -Capillary refill time <2 sec b/l  -Digital hair growth: Present  -Skin temp: WNL    Neuro:  -Light sensation intact bilaterally  -Protective sensation intact bilaterally with 10/10 points felt with Waddy Edgar monofilament    MSK:  -Pain on palpation of lateral aspect of right 1st digit  -No gross deformities noted   -Active range of motion lesser digits intact  -Manual muscle testing is 5/5 to all muscle compartments of the lower extremity  -Ankle dorsiflexion >10 degrees with knee extended, and knee flexed    Derm:  -lateral aspect of right 1st digit periungual tissue is erythematous, edematous, with mild serous drainage noted.  The lateral portion of the digital nail can be seen under lapping the periungual tissue.  This is consistent with onychocryptosis and surrounding paronychia  -No noted interdigital maceration, peeling, malodor  -No calluses noted on exam

## 2024-07-30 ENCOUNTER — OFFICE VISIT (OUTPATIENT)
Dept: PHYSICAL THERAPY | Facility: REHABILITATION | Age: 34
End: 2024-07-30
Payer: COMMERCIAL

## 2024-07-30 DIAGNOSIS — M62.89 HIGH-TONE PELVIC FLOOR DYSFUNCTION IN FEMALE: Primary | ICD-10-CM

## 2024-07-30 PROCEDURE — 97110 THERAPEUTIC EXERCISES: CPT | Performed by: PHYSICAL THERAPIST

## 2024-07-30 PROCEDURE — 97112 NEUROMUSCULAR REEDUCATION: CPT | Performed by: PHYSICAL THERAPIST

## 2024-07-30 PROCEDURE — 97140 MANUAL THERAPY 1/> REGIONS: CPT | Performed by: PHYSICAL THERAPIST

## 2024-07-30 NOTE — PROGRESS NOTES
"Daily Note     Today's date: 2024  Patient name: Sara Cruz  : 1990  MRN: [id number]  Referring provider: Cathy Griffin CRNP  Dx:   Encounter Diagnosis     ICD-10-CM    1. High-tone pelvic floor dysfunction in female  M62.89           Start Time: 1015  Stop Time: 1110  Total time in clinic (min): 55 minutes    Subjective: No complaints after last visit. She reports compliance to HEP and continues to have most hip discomfort after work shifts.      Objective: See treatment diary below      Assessment: Tolerated treatment well. Patient demonstrated fatigue post treatment, exhibited good technique with therapeutic exercises, and would benefit from continued PT. Focused on hip opening stretches and pelvic floor muscle lengthening/relaxation this visit with good tolerance. Performed PFM releases with good tolerance, mild soreness to left OI, overall minimal tension noted.       Plan: Continue per plan of care.      Precautions:  x1  Biofeedback Codes:   Goals: prepare for pregnancy, R hip/ITB pain, history of high tone PFM postpartum      Manuals           PFM releases + manual biofeedback nv nv Done  prn         Assess hip strength ** see above           R glute STM ** Done           Neuro Re-Ed             SEMG Biofeedback **            Diaphragmatic breathing nv            TA ADIM nv 10x           DKTC w DB nv nv 2' nv         Cat cows   2x10 alt w CP nv         Jennifer pose w DB nv nv 2x30\" alt w CC nv         PF elevators             PFMC Slow Holds             PFMC Quick Flicks             Seated PFMC             Standing PFMC             Ther Ex             Piriformis st nv 3x20\" b/l 3x30\" b/l           ITB st             TA + marches nv            TA + leg ext nv 10x           Dead bugs nv 2x10  nv         Bridging nv BTB 3x10  nv         Bridge marches  2x10  nv         SL bridge w pulses             TA + SLR             TA + clamshells 3\"up/3\"down nv BTB 2x10 b/l  nv      "                 Quadruped TA + PFMC    nv         Quadruped alt leg exts    nv         Bird dogs                          PFMC + TB low row             PFMC + paloff press    nv         TB trunk rotations                          Ther Activity             Education Done  Done          SL STS             Squats             PFMC + sit to stand             PFMC + lifting up             PFMC + lifting from ground             PFMC + step ups

## 2024-08-12 ENCOUNTER — APPOINTMENT (OUTPATIENT)
Dept: PHYSICAL THERAPY | Facility: REHABILITATION | Age: 34
End: 2024-08-12
Payer: COMMERCIAL

## 2024-08-12 DIAGNOSIS — N92.6 IRREGULAR MENSES: ICD-10-CM

## 2024-08-13 RX ORDER — ETONOGESTREL AND ETHINYL ESTRADIOL VAGINAL RING .015; .12 MG/D; MG/D
RING VAGINAL
Qty: 3 EACH | Refills: 1 | Status: SHIPPED | OUTPATIENT
Start: 2024-08-13

## 2024-08-19 ENCOUNTER — OFFICE VISIT (OUTPATIENT)
Dept: PHYSICAL THERAPY | Facility: REHABILITATION | Age: 34
End: 2024-08-19
Payer: COMMERCIAL

## 2024-08-19 DIAGNOSIS — M62.89 HIGH-TONE PELVIC FLOOR DYSFUNCTION IN FEMALE: Primary | ICD-10-CM

## 2024-08-19 PROCEDURE — 97112 NEUROMUSCULAR REEDUCATION: CPT | Performed by: PHYSICAL THERAPIST

## 2024-08-19 PROCEDURE — 97110 THERAPEUTIC EXERCISES: CPT | Performed by: PHYSICAL THERAPIST

## 2024-08-19 NOTE — PROGRESS NOTES
"Daily Note     Today's date: 2024  Patient name: Sara Cruz  : 1990  MRN: [id number]  Referring provider: Cathy Griffin CRNP  Dx:   Encounter Diagnosis     ICD-10-CM    1. High-tone pelvic floor dysfunction in female  M62.89           Start Time: 948  Stop Time: 1035  Total time in clinic (min): 47 minutes    Subjective: Patient reports compliance to HEP while on vacation but had difficulty fitting it in last week. She reports the hip hasn't been bothering her as much secondary to not working/being on her feet for prolonged time.      Objective: See treatment diary below      Assessment: Tolerated treatment well. Patient demonstrated fatigue post treatment, exhibited good technique with therapeutic exercises, and would benefit from continued PT. Progressed in terms of additional core strengthening with good tolerance. Encourage continued compliance to HEP with stretches daily and glute/core strengthening 2-3x/week.      Plan: Continue per plan of care.      Precautions:  x1  Biofeedback Codes:   Goals: prepare for pregnancy, R hip/ITB pain, history of high tone PFM postpartum      Manuals         PFM releases + manual biofeedback nv nv Done  prn         Assess hip strength ** see above           R glute STM ** Done           Neuro Re-Ed             SEMG Biofeedback **            Diaphragmatic breathing nv            TA ADIM nv 10x           DKTC w DB nv nv 2' nv 2'        Cat cows   2x10 alt w CP nv 2x10 alt with CP        Jennifer pose w DB nv nv 2x30\" alt w CC nv 2x30\" alt w CC        PF elevators             PFMC Slow Holds             PFMC Quick Flicks             Seated PFMC             Standing PFMC             Ther Ex             Piriformis st nv 3x20\" b/l 3x30\" b/l   3x30\" b/l         ITB st             TA + marches nv            TA + leg ext nv 10x           Dead bugs nv 2x10  nv 2x10        Bridging nv BTB 3x10  nv         Bridge marches  2x10  nv 2x10      " Conjunctivae and eyelids appear normal,  Sclerae : White without injection  "  SL bridge w pulses             TA + SLR             TA + clamshells 3\"up/3\"down nv BTB 2x10 b/l  nv BTB 3x10 b/l                     Quadruped TA + PFMC    nv 10x        Quadruped alt leg exts    nv 10x        Bird dogs                          PFMC + TB low row             PFMC + paloff press    nv Yellow 2x10 b/l        TB trunk rotations                          Ther Activity             Education Done  Done          SL STS             Squats             PFMC + sit to stand             PFMC + lifting up             PFMC + lifting from ground             PFMC + step ups                                       "

## 2024-08-28 ENCOUNTER — OFFICE VISIT (OUTPATIENT)
Dept: PHYSICAL THERAPY | Facility: REHABILITATION | Age: 34
End: 2024-08-28
Payer: COMMERCIAL

## 2024-08-28 DIAGNOSIS — M62.89 HIGH-TONE PELVIC FLOOR DYSFUNCTION IN FEMALE: Primary | ICD-10-CM

## 2024-08-28 PROCEDURE — 97112 NEUROMUSCULAR REEDUCATION: CPT | Performed by: PHYSICAL THERAPIST

## 2024-08-28 PROCEDURE — 97110 THERAPEUTIC EXERCISES: CPT | Performed by: PHYSICAL THERAPIST

## 2024-08-28 NOTE — PROGRESS NOTES
"Daily Note     Today's date: 2024  Patient name: Sara Cruz  : 1990  MRN: [id number]  Referring provider: Cathy Grfifin CRNP  Dx:   Encounter Diagnosis     ICD-10-CM    1. High-tone pelvic floor dysfunction in female  M62.89           Start Time: 08  Stop Time: 0848  Total time in clinic (min): 43 minutes    Subjective: Patient reports the hip has been feeling pretty good but can continue to have some discomfort after a work shift.      Objective: See treatment diary below      Assessment: Tolerated treatment well. Patient demonstrated fatigue post treatment, exhibited good technique with therapeutic exercises, and would benefit from continued PT. Provided patient with updated HEP.       Plan: Continue per plan of care.      Precautions:  x1  Biofeedback Codes:   Goals: prepare for pregnancy, R hip/ITB pain, history of high tone PFM postpartum      Manuals        PFM releases + manual biofeedback nv nv Done  prn         Assess hip strength ** see above           R glute STM ** Done           Neuro Re-Ed             SEMG Biofeedback **            Diaphragmatic breathing nv            TA ADIM nv 10x           DKTC w DB nv nv 2' nv 2' 2'       Cat cows   2x10 alt w CP nv 2x10 alt with CP        Jennifer pose w DB nv nv 2x30\" alt w CC nv 2x30\" alt w CC        PF elevators             PFMC Slow Holds             PFMC Quick Flicks             Seated PFMC             Standing PFMC             Ther Ex             Piriformis st nv 3x20\" b/l 3x30\" b/l   3x30\" b/l  3x30\" b/l       ITB st      Nv stand or supine       TA + marches nv            TA + leg ext nv 10x           Dead bugs nv 2x10  nv 2x10 3x10       Bridging nv BTB 3x10  nv         Bridge marches  2x10  nv 2x10 3x10       SL bridge w pulses             TA + SLR             TA + clamshells 3\"up/3\"down nv BTB 2x10 b/l  nv BTB 3x10 b/l BTB 3x12 b/l                    Quadruped TA + PFMC    nv 10x 10x       Quadruped " alt leg exts    nv 10x 2x10       Quadruped fire hydrants      3x10 b/l       Bird dogs                          PFMC + TB low row             PFMC + paloff press    nv Yellow 2x10 b/l Yellow 10x b/l       TB trunk rotations                          Ther Activity             Education Done  Done          SL STS             Squats             PFMC + sit to stand             PFMC + lifting up             PFMC + lifting from ground             PFMC + step ups

## 2024-09-05 ENCOUNTER — OFFICE VISIT (OUTPATIENT)
Dept: PHYSICAL THERAPY | Facility: REHABILITATION | Age: 34
End: 2024-09-05
Payer: COMMERCIAL

## 2024-09-05 DIAGNOSIS — M62.89 HIGH-TONE PELVIC FLOOR DYSFUNCTION IN FEMALE: Primary | ICD-10-CM

## 2024-09-05 PROCEDURE — 97110 THERAPEUTIC EXERCISES: CPT | Performed by: PHYSICAL THERAPIST

## 2024-09-05 NOTE — PROGRESS NOTES
"Daily Note     Today's date: 2024  Patient name: Sara Cruz  : 1990  MRN: [id number]  Referring provider: Cathy Griffin CRNP  Dx:   Encounter Diagnosis     ICD-10-CM    1. High-tone pelvic floor dysfunction in female  M62.89           Start Time: 920  Stop Time: 1005  Total time in clinic (min): 45 minutes    Subjective: Patient reports doing exercises once over last week. She notes some soreness in the right hip after work shift but subsides by the next morning.      Objective: See treatment diary below      Assessment: Tolerated treatment well. Patient demonstrated fatigue post treatment, exhibited good technique with therapeutic exercises, and would benefit from continued PT.       Plan: Continue per plan of care.      Precautions:  x1  Biofeedback Codes:   Goals: prepare for pregnancy, R hip/ITB pain, history of high tone PFM postpartum      Manuals       PFM releases + manual biofeedback nv nv Done  prn         Assess hip strength ** see above           R glute STM ** Done           Neuro Re-Ed             SEMG Biofeedback **            Diaphragmatic breathing nv            TA ADIM nv 10x           DKTC w DB nv nv 2' nv 2' 2'       Cat cows   2x10 alt w CP nv 2x10 alt with CP        Jennifer pose w DB nv nv 2x30\" alt w CC nv 2x30\" alt w CC        PF elevators             PFMC Slow Holds             PFMC Quick Flicks             Seated PFMC             Standing PFMC             Ther Ex             Piriformis st nv 3x20\" b/l 3x30\" b/l   3x30\" b/l  3x30\" b/l 3x30\" b/l      ITB st      Nv stand or supine Supine ITB 5x10\" b/l      TA + marches nv            TA + leg ext nv 10x           Dead bugs nv 2x10  nv 2x10 3x10 3x12      Bridging nv BTB 3x10  nv         Bridge marches  2x10  nv 2x10 3x10 3x10      SL bridge w pulses             TA + SLR             TA + clamshells 3\"up/3\"down nv BTB 2x10 b/l  nv BTB 3x10 b/l BTB 3x12 b/l BTB 3x12 b/l                 "   Quadruped TA + PFMC    nv 10x 10x       Quadruped alt leg exts    nv 10x 2x10 2x10      Quadruped fire hydrants      3x10 b/l       Bird dogs                          PFMC + TB low row             PFMC + paloff press    nv Yellow 2x10 b/l Yellow 10x b/l Yellow 10x b/l      Half kneeling chops       Yellow 2x10 b/l      TB trunk rotations             Lateral tap downs       nv      TB side stepping       nv      Ther Activity             Education Done  Done          SL STS             Squats             PFMC + sit to stand             PFMC + lifting up             PFMC + lifting from ground             PFMC + step ups

## 2024-09-12 ENCOUNTER — OFFICE VISIT (OUTPATIENT)
Dept: PHYSICAL THERAPY | Facility: REHABILITATION | Age: 34
End: 2024-09-12
Payer: COMMERCIAL

## 2024-09-12 DIAGNOSIS — M62.89 HIGH-TONE PELVIC FLOOR DYSFUNCTION IN FEMALE: Primary | ICD-10-CM

## 2024-09-12 PROCEDURE — 97110 THERAPEUTIC EXERCISES: CPT

## 2024-09-12 PROCEDURE — 97112 NEUROMUSCULAR REEDUCATION: CPT

## 2024-09-12 NOTE — PROGRESS NOTES
"Daily Note     Today's date: 2024  Patient name: Sara Cruz  : 1990  MRN: [id number]  Referring provider: Cathy Griffin CRNP  Dx:   Encounter Diagnosis     ICD-10-CM    1. High-tone pelvic floor dysfunction in female  M62.89           Start Time: 832  Stop Time: 922  Total time in clinic (min): 50 minutes    Subjective: Patient reports no complaints at start of session, reports most soreness with her hip after work. No complaints noted after previous session and compliance with HEP.      Objective: See treatment diary below      Assessment: Tolerated treatment well. Patient demonstrated fatigue post treatment, exhibited good technique with therapeutic exercises, and would benefit from continued PT Good tolerance to additional TE performed. Patient reports some discomfort upon 3rd lap of lateral walking. Given updated HEP reporting understanding.      Plan: Continue per plan of care.  Progress treatment as tolerated.       Precautions:  x1  Biofeedback Codes:   Goals: prepare for pregnancy, R hip/ITB pain, history of high tone PFM postpartum      Manuals      PFM releases + manual biofeedback nv nv Done  prn         Assess hip strength ** see above           R glute STM ** Done           Neuro Re-Ed             SEMG Biofeedback **            Diaphragmatic breathing nv            TA ADIM nv 10x           DKTC w DB nv nv 2' nv 2' 2'       Cat cows   2x10 alt w CP nv 2x10 alt with CP        Jennifer pose w DB nv nv 2x30\" alt w CC nv 2x30\" alt w CC        PF elevators             PFMC Slow Holds             PFMC Quick Flicks             Seated PFMC             Standing PFMC             Ther Ex             Piriformis st nv 3x20\" b/l 3x30\" b/l   3x30\" b/l  3x30\" b/l 3x30\" b/l 3x30'' b/l     ITB st      Nv stand or supine Supine ITB 5x10\" b/l Supine ITB 5x10'' b/l     TA + marches nv            TA + leg ext nv 10x           Dead bugs nv 2x10  nv 2x10 3x10 3x12 " "3x12     Bridging nv BTB 3x10  nv         Bridge marches  2x10  nv 2x10 3x10 3x10 3x12     SL bridge w pulses             TA + SLR             TA + clamshells 3\"up/3\"down nv BTB 2x10 b/l  nv BTB 3x10 b/l BTB 3x12 b/l BTB 3x12 b/l BTB 3x15 b/l                  Quadruped TA + PFMC    nv 10x 10x       Quadruped alt leg exts    nv 10x 2x10 2x10      Quadruped fire hydrants      3x10 b/l       Bird dogs        2x10                  PFMC + TB low row             PFMC + paloff press    nv Yellow 2x10 b/l Yellow 10x b/l Yellow 10x b/l GTB 20x b/l     Half kneeling chops       Yellow 2x10 b/l GTB 2x10 b/l     TB trunk rotations             Lateral tap downs       nv 4'' 2x10     TB side stepping       nv GTB 3 laps     Ther Activity             Education Done  Done          SL STS             Squats             PFMC + sit to stand             PFMC + lifting up             PFMC + lifting from ground             PFMC + step ups                                             "

## 2024-09-17 ENCOUNTER — OFFICE VISIT (OUTPATIENT)
Dept: PHYSICAL THERAPY | Facility: REHABILITATION | Age: 34
End: 2024-09-17
Payer: COMMERCIAL

## 2024-09-17 DIAGNOSIS — M62.89 HIGH-TONE PELVIC FLOOR DYSFUNCTION IN FEMALE: Primary | ICD-10-CM

## 2024-09-17 PROCEDURE — 97530 THERAPEUTIC ACTIVITIES: CPT

## 2024-09-17 PROCEDURE — 97110 THERAPEUTIC EXERCISES: CPT

## 2024-09-17 NOTE — PROGRESS NOTES
"Daily Note     Today's date: 2024  Patient name: Sara Cruz  : 1990  MRN: [id number]  Referring provider: Cathy Griffin CRNP  Dx:   Encounter Diagnosis     ICD-10-CM    1. High-tone pelvic floor dysfunction in female  M62.89           Start Time: 913  Stop Time: 1000  Total time in clinic (min): 47 minutes    Subjective: Patient reports no complaints or pain at start of session today. Patient reports some soreness after previous session and compliance with HEP, no other complaints.      Objective: See treatment diary below      Assessment: Tolerated treatment well. Patient demonstrated fatigue post treatment, exhibited good technique with therapeutic exercises, and would benefit from continued PT Improved tolerance to lateral walking this session with decreased resistance, fatigues quickly. No pain noted throughout session.       Plan: Continue per plan of care.  Progress treatment as tolerated.       Precautions:  x1  Biofeedback Codes:   Goals: prepare for pregnancy, R hip/ITB pain, history of high tone PFM postpartum      Manuals     PFM releases + manual biofeedback nv nv Done  prn         Assess hip strength ** see above           R glute STM ** Done           Neuro Re-Ed             SEMG Biofeedback **            Diaphragmatic breathing nv            TA ADIM nv 10x           DKTC w DB nv nv 2' nv 2' 2'       Cat cows   2x10 alt w CP nv 2x10 alt with CP        Jennifer pose w DB nv nv 2x30\" alt w CC nv 2x30\" alt w CC        PF elevators             PFMC Slow Holds             PFMC Quick Flicks             Seated PFMC             Standing PFMC             Ther Ex             Piriformis st nv 3x20\" b/l 3x30\" b/l   3x30\" b/l  3x30\" b/l 3x30\" b/l 3x30'' b/l 3x30'' b/l    ITB st      Nv stand or supine Supine ITB 5x10\" b/l Supine ITB 5x10'' b/l Supine ITB 5x10'' b/l    TA + marches nv            TA + leg ext nv 10x           Dead bugs nv 2x10  nv 2x10 " "3x10 3x12 3x12 3x12    Bridging nv BTB 3x10  nv         Bridge marches  2x10  nv 2x10 3x10 3x10 3x12 3x15    SL bridge w pulses             TA + SLR             TA + clamshells 3\"up/3\"down nv BTB 2x10 b/l  nv BTB 3x10 b/l BTB 3x12 b/l BTB 3x12 b/l BTB 3x15 b/l BTB 3x15 b/l                 Quadruped TA + PFMC    nv 10x 10x       Quadruped alt leg exts    nv 10x 2x10 2x10      Quadruped fire hydrants      3x10 b/l       Bird dogs        2x10 2x10                 PFMC + TB low row             PFMC + paloff press    nv Yellow 2x10 b/l Yellow 10x b/l Yellow 10x b/l GTB 20x b/l GTB 20x b/l    Half kneeling chops       Yellow 2x10 b/l GTB 2x10 b/l GTB 2x10 b/l    TB trunk rotations             Lateral tap downs       nv 4'' 2x10 4'' 3x10 b/l    TB side stepping       nv GTB 3 laps Pink TB 3 laps    Ther Activity             Education Done  Done          SL STS             Squats             PFMC + sit to stand         10x     PFMC + lifting up             PFMC + lifting from ground             PFMC + step ups                                               "

## 2024-09-19 ENCOUNTER — APPOINTMENT (OUTPATIENT)
Dept: PHYSICAL THERAPY | Facility: REHABILITATION | Age: 34
End: 2024-09-19
Payer: COMMERCIAL

## 2024-09-26 ENCOUNTER — APPOINTMENT (OUTPATIENT)
Dept: PHYSICAL THERAPY | Facility: REHABILITATION | Age: 34
End: 2024-09-26
Payer: COMMERCIAL

## 2024-10-21 ENCOUNTER — ULTRASOUND (OUTPATIENT)
Dept: OBGYN CLINIC | Facility: CLINIC | Age: 34
End: 2024-10-21
Payer: COMMERCIAL

## 2024-10-21 VITALS
SYSTOLIC BLOOD PRESSURE: 112 MMHG | DIASTOLIC BLOOD PRESSURE: 68 MMHG | HEIGHT: 63 IN | WEIGHT: 182.8 LBS | BODY MASS INDEX: 32.39 KG/M2

## 2024-10-21 DIAGNOSIS — Z3A.08 8 WEEKS GESTATION OF PREGNANCY: ICD-10-CM

## 2024-10-21 DIAGNOSIS — O21.9 NAUSEA AND VOMITING IN PREGNANCY: Primary | ICD-10-CM

## 2024-10-21 PROCEDURE — 99214 OFFICE O/P EST MOD 30 MIN: CPT | Performed by: STUDENT IN AN ORGANIZED HEALTH CARE EDUCATION/TRAINING PROGRAM

## 2024-10-21 PROCEDURE — 76817 TRANSVAGINAL US OBSTETRIC: CPT | Performed by: STUDENT IN AN ORGANIZED HEALTH CARE EDUCATION/TRAINING PROGRAM

## 2024-10-21 RX ORDER — PYRIDOXINE HCL (VITAMIN B6) 25 MG
25 TABLET ORAL 3 TIMES DAILY
Qty: 90 TABLET | Refills: 0 | Status: SHIPPED | OUTPATIENT
Start: 2024-10-21 | End: 2024-11-20

## 2024-10-21 NOTE — PROGRESS NOTES
S: 34 y.o.  who presents for viability scan with LMP of 24. Regular periods. She is 8 weeks and 4 days by her LMP. She reports nausea and emesis with brushing teeth. She mild cramping and denies vaginal bleeding. This is a planned and welcomed pregnancy. Her previous pregnancy was complicated by LTCS for intolerance.     She is taking her prenatal vitamin.     Past Medical History:   Diagnosis Date    Anxiety     Depression     Elevated cortisol level        OB History    Para Term  AB Living   2 1 1     1   SAB IAB Ectopic Multiple Live Births           1      # Outcome Date GA Lbr Luis/2nd Weight Sex Type Anes PTL Lv   2 Current            1 Term 22 40w1d  3730 g (8 lb 3.6 oz) F CS-Unspec EPI  OFELIA        O:  Vitals:    10/21/24 1447   BP: 112/68       TVUS: viable, solorio IUP at 8 weeks 4 days with CRL 2.01 cm. . ANNA 25. Final dating via LMP consistent with 1st tri US.      A/P:  #1. IUP at 8 weeks and 4 days  - Viable pregnancy on TVUS  - RTC in 1-2 week for nurse intake visit  - MFM referral placed for cfDNA and NT      Problem List       Fatigue    Anxiety     Other Visit Diagnoses       Nausea and vomiting in pregnancy    -  Primary    8 weeks gestation of pregnancy                   Zaira Ragland MD    OB/GYN  10/21/2024  3:19 PM

## 2024-10-22 ENCOUNTER — TELEPHONE (OUTPATIENT)
Age: 34
End: 2024-10-22

## 2024-11-06 ENCOUNTER — INITIAL PRENATAL (OUTPATIENT)
Dept: OBGYN CLINIC | Facility: CLINIC | Age: 34
End: 2024-11-06

## 2024-11-06 VITALS
DIASTOLIC BLOOD PRESSURE: 68 MMHG | HEIGHT: 63 IN | WEIGHT: 187.4 LBS | BODY MASS INDEX: 33.2 KG/M2 | SYSTOLIC BLOOD PRESSURE: 110 MMHG

## 2024-11-06 DIAGNOSIS — Z36.89 ENCOUNTER FOR OTHER SPECIFIED ANTENATAL SCREENING: ICD-10-CM

## 2024-11-06 DIAGNOSIS — Z31.430 ENCOUNTER OF FEMALE FOR TESTING FOR GENETIC DISEASE CARRIER STATUS FOR PROCREATIVE MANAGEMENT: Primary | ICD-10-CM

## 2024-11-06 DIAGNOSIS — D56.9 THALASSEMIA, UNSPECIFIED TYPE: ICD-10-CM

## 2024-11-06 DIAGNOSIS — R63.8 INCREASED BMI: ICD-10-CM

## 2024-11-06 DIAGNOSIS — Z34.81 PRENATAL CARE, SUBSEQUENT PREGNANCY, FIRST TRIMESTER: ICD-10-CM

## 2024-11-06 PROBLEM — O21.9 NAUSEA AND VOMITING DURING PREGNANCY: Status: ACTIVE | Noted: 2024-11-06

## 2024-11-06 PROBLEM — O34.219 PREVIOUS CESAREAN DELIVERY, ANTEPARTUM: Status: ACTIVE | Noted: 2024-11-06

## 2024-11-06 PROBLEM — R11.2 NAUSEA AND VOMITING: Status: ACTIVE | Noted: 2024-11-06

## 2024-11-06 PROBLEM — Z34.90 PREGNANT: Status: ACTIVE | Noted: 2024-11-06

## 2024-11-06 PROBLEM — Z87.891 HISTORY OF TOBACCO USE: Status: ACTIVE | Noted: 2024-11-06

## 2024-11-06 PROBLEM — B00.9 HSV (HERPES SIMPLEX VIRUS) INFECTION: Status: ACTIVE | Noted: 2024-11-06

## 2024-11-06 PROBLEM — Z87.59 HISTORY OF POSTPARTUM DEPRESSION: Status: ACTIVE | Noted: 2024-11-06

## 2024-11-06 PROBLEM — Z86.59 HISTORY OF POSTPARTUM DEPRESSION: Status: ACTIVE | Noted: 2024-11-06

## 2024-11-06 PROCEDURE — OBC: Performed by: STUDENT IN AN ORGANIZED HEALTH CARE EDUCATION/TRAINING PROGRAM

## 2024-11-06 NOTE — PATIENT INSTRUCTIONS
Congratulations!! Please review our Pregnancy Essential Guide and [facility] L&D Virtual tour from our networks website.     [facility] Pregnancy Essentials Guide  [facility] ([email or web address])     [facility] - Virtual Tour ([employer])        Joe Sara,    It was so nice getting to know you today. Remember if you have an urgent or time sensitive concern, please call the practice phone number so a clinical triage team member can review your symptoms and get in touch with our on call provider if necessary. If you have general questions or need help navigating our services please REPLY to this message so it comes directly to me and I will respond between other patients. If I am out of the office for any reason, another nurse navigator may reach out to help you. Our Pregnancy Essential Guide is a great online resource--please use the link below.     [facility] Pregnancy Essentials Guide  [facility] ([email or web address])     Again, Congratulations and Thank You for choosing [facility]. I look forward to helping you through this journey. Reach out -   Ivy MAN RN

## 2024-11-06 NOTE — PROGRESS NOTES
OB INTAKE INTERVIEW  Patient is 34 y.o. who presents for OB intake at 10 wks 6 days  She is accompanied by herself during this encounter  The father of her baby (Fabiano Cruz) is involved in the pregnancy and is 32 years old.      Last Menstrual Period: 2024  Ultrasound: Measured 8 weeks 4 days on 10/21/2024  Estimated Date of Delivery: 2025 confirmed by US    Signs/Symptoms of Pregnancy  Current pregnancy symptoms: nausea (taking B6 25 mg TID, Unisom 1 tab q hs), occas vomiting, urinary frequency  Constipation no  Headaches YES (infrequent - not migraine type)  Cramping/spotting no  PICA cravings no    Diabetes-  Body mass index is 33.2 kg/m².  If patient has 1 or more, please order early 1 hour GTT  History of GDM no  BMI >35 no  History of PCOS or current metformin use no  History of LGA/macrosomic infant (4000g/9lbs) no    If patient has 2 or more, please order early 1 hour GTT  BMI>30 YES  AMA no  First degree relative with type 2 diabetes YES  History of chronic HTN, hyperlipidemia, elevated A1C no  High risk race (, , ,  or ) no    Hypertension- if you answer yes to any of the following, please order baseline preeclampsia labs (cbc, comprehensive metabolic panel, urine protein creatinine ratio, uric acid)  History of of chronic HTN no  History of gestational HTN no  History of preeclampsia, eclampsia, or HELLP syndrome no  History of diabetes no  History of lupus,sjogrens syndrome, kidney disease no    Thyroid- if yes order TSH with reflex T4  History of thyroid disease no    Bleeding Disorder or Hx of DVT-patient or first degree relative with history of. Order the following if not done previously.   (Factor V, antithrombin III, prothrombin gene mutation, protein C and S Ag, lupus anticoagulant, anticardiolipin, beta-2 glycoprotein)   no    OB/GYN-  History of abnormal pap smear no       Date of last pap smear 3/29/2023 (wnl (-)  hpv  History of HPV no  History of Herpes/HSV YES (oral - occas)  History of other STI (gonorrhea, chlamydia, trich) no  History of prior  no  History of prior  YES  History of  delivery prior to 36 weeks 6 days no  History of Varicella or Vaccination patient had chickenpox in childhood  History of blood transfusion no  Ok for blood transfusion YES    Substance screening-   History of tobacco use YES  Currently using tobacco no  Substance Use Screen Level (N/A, LOW, HIGH) N/A    MRSA Screening-   Does the pt have a hx of MRSA? no    Immunizations:  Influenza vaccine given this season YES - patient had flu vaccine today  Discussed Tdap vaccine YES  Discussed COVID Vaccine - patient had Covid vaccine x 2 + booster x 2, patient had Covid 10/2024    Genetic/MFM-  Do you or your partner have a history of any of the following in yourselves or first degree relatives?  Cystic fibrosis no  Spinal muscular atrophy no  Hemoglobinopathy/Sickle Cell/Thalassemia no  Fragile X Intellectual Disability no    If yes, discuss Carrier Screening and recommend consultation with MFM/Genetic Counseling and place specific [facility] Referral for.    If no, discuss Carrier Screening being completed once in a lifetime as a standard of care lab test. Place orders for Cystic Fibrosis Gene Test (ZKC567) and Spinal Muscular Atrophy DNA ([id number])  - discussed & ordered     Appointment for Nuchal Translucency Ultrasound at [facility] scheduled for 11/15/2024 - discussed NIPT/MSAFP      Interview education  [facility] Pregnancy Essentials Book reviewed, discussed and attached to their AVS YES    Nurse/Family Partnership- patient may qualify; referral placed NO    Prenatal lab work scripts YES ([facility])  Extra labs ordered:  1 hr glucose, Hgb fractionation cascade, Cf/SMA carrier screeening    Aspirin/Preeclampsia Screen    Risk Level Risk Factor Recommendation   LOW Prior Uncomplicated full-term delivery yes No Aspirin recommendation         MODERATE Nulliparity no Recommend low-dose aspirin if     BMI>30 YES 2 or more moderate risk factors    Family History Preeclampsia (mother/sister) no     35yr old or greater no     Black Race, Concern for SDOH/Low Socioeconomic no     IVF Pregnancy  no     Personal History Risks (low birth weight, prior adverse preg outcome, >10yr preg interval) no         HIGH History of Preeclampsia no Recommend low-dose aspirin if     Multifetal gestation no 1 or more high risk factors    Chronic HTN no     Type 1 or 2 Diabetes no     Renal Disease no     Autoimmune Disease  no      Contraindications to ASA therapy:  NSAID/ ASA allergy: no  Nasal polyps: no  Asthma with history of ASA induced bronchospasm: no  Relative contraindications:  History of GI bleed: no  Active peptic ulcer disease: no  Severe hepatic dysfunction: no    Patient should be recommended to take ASA 162mg during this pregnancy from 12-36wks to lower her risk of preeclampsia: LOW RISK per screening protocol          The patient has a history now or in prior pregnancy notable for:    - previous C/S (8/2022) - SROM, pit augment, dilated to 6 cm, fetal intolerance  - records release signed today for transfer of records from [facility] ([place], [place]) - operative report (C/S) & discharge summary - faxed to [phone]  - hx anxiety/depression - previously on Zoloft - had postpartum depression (was taking Buspar, Wellbutrin, Fluoxitene postpartum), currently on Wellbutrin 150 mg/day & Fluoxitene 40 mg/day  - sees psychiatry q 3-4 months - EPDS today = 12      Details that I feel the provider should be aware of:   - 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 The patient was oriented to our practice, the navigator role, reviewed delivering physicians and [facility] for Delivery. All questions  were answered.    Interviewed by: KAHLIL Amador RN

## 2024-11-14 PROBLEM — E66.811 OBESITY, CLASS I, BMI 30-34.9: Status: ACTIVE | Noted: 2024-11-14

## 2024-11-14 PROBLEM — Z34.91 PRENATAL CARE IN FIRST TRIMESTER: Status: ACTIVE | Noted: 2024-11-14

## 2024-11-14 NOTE — PROGRESS NOTES
"OB/GYN  PN Visit  Sara Cruz  [id number]  2024  2:26 PM  KOURTNEY Whitlock    S: 34 y.o.  12w4d here for PN visit. Pregnancy complicated by prior LTCS, elevated BMI, and HSV.     OB complaints:  Denies c/o n/v/ha, no edema, no smoking, no DV.   No vb/lof  No cramping/ctxns or signs of PTL.    She reports nausea with occ vomiting. She utilizes vitamin B6 and unisom, with moderate relief.    O:    Pre-Scooby Vitals      Flowsheet Row Most Recent Value   Prenatal Assessment    Fetal Heart Rate 164   Prenatal Vitals    Blood Pressure 100/72   Weight - Scale 86.2 kg (190 lb)   Urine Albumin/Glucose    Dilation/Effacement/Station    Vaginal Drainage    Edema               Gen: no acute distress, nonlabored breathing.  OB exam completed: fundal height, +FHT.  Urine: -/-    A/P:  Problem List Items Addressed This Visit       History of postpartum depression    BMI 33.0-33.9,adult    [facility] recommendation: \"A healthy diet and exercise, as well as appropriate gestational weight gain (no more than 11-20 pounds) can help reduce risk of these complications. 150 minutes of moderate exercise per week is recommended for all pregnant women. Nutrition counseling is also  available if desired. Early screening for gestational diabetes is recommended, as well as routine re-screening at 24-28 weeks if early screening results are normal.\"         Nausea and vomiting during pregnancy    Vitamin B6 and Doxylamine         HSV (herpes simplex virus) infection    Oral HSV         Previous  delivery, antepartum - Primary    R/T to fetal intolerance.  -Patient is considering TOLAC         Prenatal care in first trimester    - Continue PNV  - Labor precautions reviewed  - Fetal movement reviewed  - Labs: advised to complete  -Pap:  WNL  -Cultures and PE done today.  -Rh: unknown  - Genetics: [facility] appt 11/15  - Ultrasounds: Level II US scheduled  - Tdap: offer at 28 weeks  - Flu Shot: received at work. 24  - RSV:  " Will offer during season (9/1-1/31) @ [id number]-25c0n   - Rhogam: unknown  - Delivery: TBD  - Contraception: TBD  - Breastfeeding: TBD  - Pediatrician: TBD  -Delivery Consent & Packet: 30 weeks  -GBS: at 36 weeks  - RTO in 4 weeks           Relevant Orders    Chlamydia/GC amplified DNA by PCR    Trichomonas Vaginalis, TONE       KOURTNEY Whitlock  11/18/2024  2:26 PM

## 2024-11-14 NOTE — ASSESSMENT & PLAN NOTE
- Continue PNV  - Labor precautions reviewed  - Fetal movement reviewed  - Labs: advised to complete  -Pap: 2023 WNL  -Cultures and PE done today.  -Rh: unknown  - Genetics: [facility] appt 11/15  - Ultrasounds: Level II US scheduled  - Tdap: offer at 28 weeks  - Flu Shot: received at work. 11/6/24  - RSV:  Will offer during season (9/1-1/31) @ [id number]-37g0n   - Rhogam: unknown  - Delivery: TBD  - Contraception: TBD  - Breastfeeding: TBD  - Pediatrician: TBD  -Delivery Consent & Packet: 30 weeks  -GBS: at 36 weeks  - RTO in 4 weeks

## 2024-11-15 ENCOUNTER — ROUTINE PRENATAL (OUTPATIENT)
Facility: HOSPITAL | Age: 34
End: 2024-11-15
Attending: STUDENT IN AN ORGANIZED HEALTH CARE EDUCATION/TRAINING PROGRAM
Payer: COMMERCIAL

## 2024-11-15 ENCOUNTER — APPOINTMENT (OUTPATIENT)
Dept: LAB | Facility: CLINIC | Age: 34
End: 2024-11-15
Payer: COMMERCIAL

## 2024-11-15 VITALS
SYSTOLIC BLOOD PRESSURE: 106 MMHG | WEIGHT: 184.3 LBS | DIASTOLIC BLOOD PRESSURE: 54 MMHG | BODY MASS INDEX: 32.66 KG/M2 | HEIGHT: 63 IN | HEART RATE: 92 BPM

## 2024-11-15 DIAGNOSIS — O34.219 HISTORY OF CESAREAN DELIVERY, ANTEPARTUM: ICD-10-CM

## 2024-11-15 DIAGNOSIS — O99.211 OBESITY AFFECTING PREGNANCY IN FIRST TRIMESTER, UNSPECIFIED OBESITY TYPE: ICD-10-CM

## 2024-11-15 DIAGNOSIS — Z34.81 PRENATAL CARE, SUBSEQUENT PREGNANCY, FIRST TRIMESTER: ICD-10-CM

## 2024-11-15 DIAGNOSIS — Z3A.08 8 WEEKS GESTATION OF PREGNANCY: ICD-10-CM

## 2024-11-15 DIAGNOSIS — Z36.0 ENCOUNTER FOR ANTENATAL SCREENING FOR CHROMOSOMAL ANOMALIES: Primary | ICD-10-CM

## 2024-11-15 DIAGNOSIS — Z31.430 ENCOUNTER OF FEMALE FOR TESTING FOR GENETIC DISEASE CARRIER STATUS FOR PROCREATIVE MANAGEMENT: ICD-10-CM

## 2024-11-15 DIAGNOSIS — D56.9 THALASSEMIA, UNSPECIFIED TYPE: ICD-10-CM

## 2024-11-15 DIAGNOSIS — Z3A.12 12 WEEKS GESTATION OF PREGNANCY: ICD-10-CM

## 2024-11-15 DIAGNOSIS — Z36.89 ENCOUNTER FOR OTHER SPECIFIED ANTENATAL SCREENING: ICD-10-CM

## 2024-11-15 DIAGNOSIS — E66.811 OBESITY, CLASS I, BMI 30-34.9: ICD-10-CM

## 2024-11-15 LAB
ABO GROUP BLD: NORMAL
BASOPHILS # BLD AUTO: 0.02 THOUSANDS/ÂΜL (ref 0–0.1)
BASOPHILS NFR BLD AUTO: 0 % (ref 0–1)
BILIRUB UR QL STRIP: NEGATIVE
BLD GP AB SCN SERPL QL: NEGATIVE
CLARITY UR: CLEAR
COLOR UR: NORMAL
EOSINOPHIL # BLD AUTO: 0.06 THOUSAND/ÂΜL (ref 0–0.61)
EOSINOPHIL NFR BLD AUTO: 1 % (ref 0–6)
ERYTHROCYTE [DISTWIDTH] IN BLOOD BY AUTOMATED COUNT: 12.9 % (ref 11.6–15.1)
GLUCOSE UR STRIP-MCNC: NEGATIVE MG/DL
HBV SURFACE AG SER QL: NORMAL
HCT VFR BLD AUTO: 37.8 % (ref 34.8–46.1)
HCV AB SER QL: NORMAL
HGB BLD-MCNC: 12.6 G/DL (ref 11.5–15.4)
HGB UR QL STRIP.AUTO: NEGATIVE
HIV 1+2 AB+HIV1 P24 AG SERPL QL IA: NORMAL
HIV 2 AB SERPL QL IA: NORMAL
HIV1 AB SERPL QL IA: NORMAL
HIV1 P24 AG SERPL QL IA: NORMAL
IMM GRANULOCYTES # BLD AUTO: 0.03 THOUSAND/UL (ref 0–0.2)
IMM GRANULOCYTES NFR BLD AUTO: 0 % (ref 0–2)
KETONES UR STRIP-MCNC: NEGATIVE MG/DL
LEUKOCYTE ESTERASE UR QL STRIP: NEGATIVE
LYMPHOCYTES # BLD AUTO: 1.5 THOUSANDS/ÂΜL (ref 0.6–4.47)
LYMPHOCYTES NFR BLD AUTO: 19 % (ref 14–44)
MCH RBC QN AUTO: 30.2 PG (ref 26.8–34.3)
MCHC RBC AUTO-ENTMCNC: 33.3 G/DL (ref 31.4–37.4)
MCV RBC AUTO: 91 FL (ref 82–98)
MONOCYTES # BLD AUTO: 0.38 THOUSAND/ÂΜL (ref 0.17–1.22)
MONOCYTES NFR BLD AUTO: 5 % (ref 4–12)
NEUTROPHILS # BLD AUTO: 6.04 THOUSANDS/ÂΜL (ref 1.85–7.62)
NEUTS SEG NFR BLD AUTO: 75 % (ref 43–75)
NITRITE UR QL STRIP: NEGATIVE
NRBC BLD AUTO-RTO: 0 /100 WBCS
PH UR STRIP.AUTO: 5.5 [PH]
PLATELET # BLD AUTO: 244 THOUSANDS/UL (ref 149–390)
PMV BLD AUTO: 10.9 FL (ref 8.9–12.7)
PROT UR STRIP-MCNC: NEGATIVE MG/DL
RBC # BLD AUTO: 4.17 MILLION/UL (ref 3.81–5.12)
RH BLD: POSITIVE
RUBV IGG SERPL IA-ACNC: 72.8 IU/ML
SP GR UR STRIP.AUTO: 1.01 (ref 1–1.03)
SPECIMEN EXPIRATION DATE: NORMAL
TREPONEMA PALLIDUM IGG+IGM AB [PRESENCE] IN SERUM OR PLASMA BY IMMUNOASSAY: NORMAL
UROBILINOGEN UR STRIP-ACNC: <2 MG/DL
WBC # BLD AUTO: 8.03 THOUSAND/UL (ref 4.31–10.16)

## 2024-11-15 PROCEDURE — 76813 OB US NUCHAL MEAS 1 GEST: CPT | Performed by: OBSTETRICS & GYNECOLOGY

## 2024-11-15 PROCEDURE — 86780 TREPONEMA PALLIDUM: CPT

## 2024-11-15 PROCEDURE — 36415 COLL VENOUS BLD VENIPUNCTURE: CPT

## 2024-11-15 PROCEDURE — 99244 OFF/OP CNSLTJ NEW/EST MOD 40: CPT | Performed by: OBSTETRICS & GYNECOLOGY

## 2024-11-15 PROCEDURE — 81003 URINALYSIS AUTO W/O SCOPE: CPT

## 2024-11-15 PROCEDURE — 86901 BLOOD TYPING SEROLOGIC RH(D): CPT

## 2024-11-15 PROCEDURE — 87086 URINE CULTURE/COLONY COUNT: CPT

## 2024-11-15 PROCEDURE — 86850 RBC ANTIBODY SCREEN: CPT

## 2024-11-15 PROCEDURE — 85025 COMPLETE CBC W/AUTO DIFF WBC: CPT

## 2024-11-15 PROCEDURE — 87340 HEPATITIS B SURFACE AG IA: CPT

## 2024-11-15 PROCEDURE — 76801 OB US < 14 WKS SINGLE FETUS: CPT | Performed by: OBSTETRICS & GYNECOLOGY

## 2024-11-15 PROCEDURE — 86900 BLOOD TYPING SEROLOGIC ABO: CPT

## 2024-11-15 PROCEDURE — 86762 RUBELLA ANTIBODY: CPT

## 2024-11-15 PROCEDURE — 83020 HEMOGLOBIN ELECTROPHORESIS: CPT

## 2024-11-15 PROCEDURE — 86803 HEPATITIS C AB TEST: CPT

## 2024-11-15 PROCEDURE — 87389 HIV-1 AG W/HIV-1&-2 AB AG IA: CPT

## 2024-11-15 PROCEDURE — 36415 COLL VENOUS BLD VENIPUNCTURE: CPT | Performed by: OBSTETRICS & GYNECOLOGY

## 2024-11-15 NOTE — LETTER
"November 15, 2024    Zaira Ragland MD  [address] [facility] [address]    Patient: Sara Cruz   YOB: 1990   Date of Visit: 11/15/2024   Gestational age 12w1d   Nature of this communication: Routine       Dear Dr Ragland,    This patient was seen recently in our  office.  The content of my evaluation today is in the ultrasound report under \"OB Procedures\" tab.     Please don't hesitate to contact our office with any concerns or questions.     Sincerely,      Alisa Patricia MD  Attending Physician, Maternal-Fetal Medicine  [facility]    D  "

## 2024-11-15 NOTE — PROGRESS NOTES
Patient chose to have [facility] [id number] Non-Invasive Prenatal Screen [id number] MT21 PLUS Core + SCA, NO fetal sex.  Patient choose to be billed through insurance.     Patient given brochure and is aware [facility] will contact patient's insurance and coordinate coverage.  Provided [facility] contact information. General inquiries [phone], Cost estimates [phone] and [facility] Billing [phone]. Website womenLP [email or web address].     Blood collection tubes labeled with patient identifiers (name, medical record number, and date of birth).     Filled out [facility] order form. Patient chose to have blood drawn in our office at time of visit. NIPS was drawn from left arm with a butterfly needle by Cassie Draper RN. .          Maternal Fetal Medicine will have results in approximately 5-7 business days and will call patient or notify via Ray[employer].  Patient aware viewing lab result online will reveal fetal sex if ordered.    Patient verbalized understanding of all instructions and no questions at this time.

## 2024-11-15 NOTE — PROGRESS NOTES
"[facility]: Sara Cruz was seen today for nuchal translucency ultrasound.  See ultrasound report under \"OB Procedures\" tab.   Please don't hesitate to contact our office with any concerns or questions.  -Alisa Patricia MD      "

## 2024-11-16 LAB
BACTERIA UR CULT: NORMAL
CFDNA.FET/CFDNA.TOTAL SFR FETUS: NORMAL %
CITATION REF LAB TEST: NORMAL
FET 13+18+21+X+Y ANEUP PLAS.CFDNA: NORMAL
FET CHR 21 TS PLAS.CFDNA QL: NORMAL
FET CHR 21 TS PLAS.CFDNA QL: NORMAL
FET MS X RISK WBC.DNA+CFDNA QL: NORMAL
FET SEX PLAS.CFDNA DOSAGE CFDNA: NORMAL
FET TS 13 RISK PLAS.CFDNA QL: NORMAL
FET X + Y ANEUP RISK PLAS.CFDNA SEQ-IMP: NORMAL
GA EST FROM CONCEPTION DATE: NORMAL D
GESTATIONAL AGE > 9:: NORMAL
LAB DIRECTOR NAME PROVIDER: NORMAL
LAB DIRECTOR NAME PROVIDER: NORMAL
LABORATORY COMMENT REPORT: NORMAL
LIMITATIONS OF THE TEST: NORMAL
NEGATIVE PREDICTIVE VALUE: NORMAL
PERFORMANCE CHARACTERISTICS: NORMAL
POSITIVE PREDICTIVE VALUE: NORMAL
REF LAB TEST METHOD: NORMAL
SL AMB NOTE:: NORMAL
TEST PERFORMANCE INFO SPEC: NORMAL

## 2024-11-18 ENCOUNTER — RESULTS FOLLOW-UP (OUTPATIENT)
Dept: LABOR AND DELIVERY | Facility: HOSPITAL | Age: 34
End: 2024-11-18

## 2024-11-18 ENCOUNTER — INITIAL PRENATAL (OUTPATIENT)
Dept: OBGYN CLINIC | Facility: CLINIC | Age: 34
End: 2024-11-18

## 2024-11-18 VITALS
SYSTOLIC BLOOD PRESSURE: 100 MMHG | BODY MASS INDEX: 33.66 KG/M2 | WEIGHT: 190 LBS | DIASTOLIC BLOOD PRESSURE: 72 MMHG | HEIGHT: 63 IN

## 2024-11-18 DIAGNOSIS — Z87.59 HISTORY OF POSTPARTUM DEPRESSION: ICD-10-CM

## 2024-11-18 DIAGNOSIS — Z86.59 HISTORY OF POSTPARTUM DEPRESSION: ICD-10-CM

## 2024-11-18 DIAGNOSIS — O21.9 NAUSEA AND VOMITING DURING PREGNANCY: ICD-10-CM

## 2024-11-18 DIAGNOSIS — O34.219 PREVIOUS CESAREAN DELIVERY, ANTEPARTUM: Primary | ICD-10-CM

## 2024-11-18 DIAGNOSIS — Z34.91 PRENATAL CARE IN FIRST TRIMESTER: ICD-10-CM

## 2024-11-18 DIAGNOSIS — B00.9 HSV (HERPES SIMPLEX VIRUS) INFECTION: ICD-10-CM

## 2024-11-18 LAB
HGB A MFR BLD: 2.5 % (ref 1.8–3.2)
HGB A MFR BLD: 97.5 % (ref 96.4–98.8)
HGB F MFR BLD: 0 % (ref 0–2)
HGB FRACT BLD-IMP: NORMAL
HGB S MFR BLD: 0 %

## 2024-11-18 PROCEDURE — PNV

## 2024-11-18 NOTE — ASSESSMENT & PLAN NOTE
"[facility] recommendation: \"A healthy diet and exercise, as well as appropriate gestational weight gain (no more than 11-20 pounds) can help reduce risk of these complications. 150 minutes of moderate exercise per week is recommended for all pregnant women. Nutrition counseling is also  available if desired. Early screening for gestational diabetes is recommended, as well as routine re-screening at 24-28 weeks if early screening results are normal.\"  "

## 2024-11-20 LAB
C TRACH RRNA SPEC QL NAA+PROBE: NOT DETECTED
N GONORRHOEA RRNA SPEC QL NAA+PROBE: NOT DETECTED
T VAGINALIS RRNA SPEC QL NAA+PROBE: NOT DETECTED

## 2024-11-21 ENCOUNTER — RESULTS FOLLOW-UP (OUTPATIENT)
Dept: PERINATAL CARE | Facility: OTHER | Age: 34
End: 2024-11-21

## 2024-11-21 ENCOUNTER — RESULTS FOLLOW-UP (OUTPATIENT)
Dept: OBGYN CLINIC | Facility: CLINIC | Age: 34
End: 2024-11-21

## 2024-11-21 DIAGNOSIS — O21.9 NAUSEA AND VOMITING IN PREGNANCY: ICD-10-CM

## 2024-11-21 LAB
CFDNA.FET/CFDNA.TOTAL SFR FETUS: NORMAL %
CITATION REF LAB TEST: NORMAL
FET 13+18+21+X+Y ANEUP PLAS.CFDNA: NEGATIVE
FET CHR 21 TS PLAS.CFDNA QL: NEGATIVE
FET CHR 21 TS PLAS.CFDNA QL: NEGATIVE
FET MS X RISK WBC.DNA+CFDNA QL: NOT DETECTED
FET SEX PLAS.CFDNA DOSAGE CFDNA: NORMAL
FET TS 13 RISK PLAS.CFDNA QL: NEGATIVE
FET X + Y ANEUP RISK PLAS.CFDNA SEQ-IMP: NOT DETECTED
GA EST FROM CONCEPTION DATE: NORMAL D
GESTATIONAL AGE > 9:: YES
LAB DIRECTOR NAME PROVIDER: NORMAL
LAB DIRECTOR NAME PROVIDER: NORMAL
LABORATORY COMMENT REPORT: NORMAL
LIMITATIONS OF THE TEST: NORMAL
NEGATIVE PREDICTIVE VALUE: NORMAL
PERFORMANCE CHARACTERISTICS: NORMAL
POSITIVE PREDICTIVE VALUE: NORMAL
REF LAB TEST METHOD: NORMAL
SL AMB NOTE:: NORMAL
TEST PERFORMANCE INFO SPEC: NORMAL

## 2024-11-22 ENCOUNTER — APPOINTMENT (OUTPATIENT)
Dept: LAB | Facility: HOSPITAL | Age: 34
End: 2024-11-22
Payer: COMMERCIAL

## 2024-11-22 ENCOUNTER — TELEPHONE (OUTPATIENT)
Dept: OBGYN CLINIC | Facility: CLINIC | Age: 34
End: 2024-11-22

## 2024-11-22 DIAGNOSIS — Z34.81 PRENATAL CARE, SUBSEQUENT PREGNANCY, FIRST TRIMESTER: ICD-10-CM

## 2024-11-22 DIAGNOSIS — R63.8 INCREASED BMI: ICD-10-CM

## 2024-11-22 LAB — GLUCOSE 1H P 50 G GLC PO SERPL-MCNC: 100 MG/DL (ref 70–134)

## 2024-11-22 PROCEDURE — 36415 COLL VENOUS BLD VENIPUNCTURE: CPT

## 2024-11-22 PROCEDURE — 82950 GLUCOSE TEST: CPT

## 2024-11-22 RX ORDER — DIPHENHYDRAMINE HYDROCHLORIDE 25 MG/1
25 CAPSULE ORAL 3 TIMES DAILY
Qty: 90 TABLET | Refills: 0 | Status: SHIPPED | OUTPATIENT
Start: 2024-11-22

## 2024-11-22 NOTE — TELEPHONE ENCOUNTER
Called pt let them know we sent the medication refill over to the pharmacy and to follow up with them to make sure it is ready for

## 2024-12-13 PROBLEM — Z34.92 PRENATAL CARE IN SECOND TRIMESTER: Status: ACTIVE | Noted: 2024-11-14

## 2024-12-13 NOTE — ASSESSMENT & PLAN NOTE
"[facility] recommendation: \"A healthy diet and exercise, as well as appropriate gestational weight gain (no more than 11-20 pounds) can help reduce risk of these complications. 150 minutes of moderate exercise per week is recommended for all pregnant women. Nutrition counseling is also  available if desired. Early screening for gestational diabetes is recommended, as well as routine re-screening at 24-28 weeks if early screening results are normal.\"  "

## 2024-12-13 NOTE — ASSESSMENT & PLAN NOTE
- Continue PNV  - Labor precautions reviewed  - Fetal movement reviewed  - Labs: advised to complete  -Pap: 2023 WNL  -Rh: unknown  - Genetics:NIP WNL  - Ultrasounds: Level II US scheduled 01/14/25  - Tdap: offer at 28 weeks  - Flu Shot: received at work. 11/6/24  - RSV:  Will offer during season (9/1-1/31) @ [id number]   - Rhogam: unknown  - Delivery: TBD  - Contraception: TBD  - Breastfeeding: TBD  - Pediatrician: TBD  -Delivery Consent & Packet: 30 weeks  -GBS: at 36 weeks  - RTO in 4 weeks

## 2024-12-13 NOTE — PROGRESS NOTES
"OB/GYN  PN Visit  Sara Cruz  [id number]  2024  2:47 PM  KOURTNEY Whitlock    S: 34 y.o.  16w4d here for PN visit.     OB complaints:  Denies c/o ha, no edema, no smoking, no DV.   No vb/lof  No cramping/ctxns or signs of PTL.    She reports nausea/vomiting (tolerable). Vomiting occurs with brushing teeth. Headaches (prior to pregnancy)- currently tolerable.     O:    Pre- Vitals      Flowsheet Row Most Recent Value   Prenatal Assessment    Fetal Heart Rate 150   Prenatal Vitals    Blood Pressure 100/64   Urine Albumin/Glucose    Dilation/Effacement/Station    Vaginal Drainage    Edema               Gen: no acute distress, nonlabored breathing.  OB exam completed: fundal height, +FHT.  Urine: -/-    A/P:  Problem List Items Addressed This Visit       Anxiety    -Continue Wellbutrin  -Continue Fluoxetine         History of postpartum depression    BMI 33.0-33.9,adult    MFM recommendation: \"A healthy diet and exercise, as well as appropriate gestational weight gain (no more than 11-20 pounds) can help reduce risk of these complications. 150 minutes of moderate exercise per week is recommended for all pregnant women. Nutrition counseling is also  available if desired. Early screening for gestational diabetes is recommended, as well as routine re-screening at 24-28 weeks if early screening results are normal.\"         Nausea and vomiting during pregnancy    HSV (herpes simplex virus) infection    Oral HSV         Previous  delivery, antepartum    R/T to fetal intolerance.  -Patient is considering TOLAC         Prenatal care in second trimester - Primary    - Continue PNV  - Labor precautions reviewed  - Fetal movement reviewed  - Labs: advised to complete  -Pap:  WNL  -Rh: unknown  - Genetics:NIP WNL  - Ultrasounds: Level II US scheduled 25  - Tdap: offer at 28 weeks  - Flu Shot: received at work. 24  - RSV:  Will offer during season (-) @ [id number]   - Rhogam: " unknown  - Delivery: TBD  - Contraception: TBD  - Breastfeeding: TBD  - Pediatrician: TBD  -Delivery Consent & Packet: 30 weeks  -GBS: at 36 weeks  - RTO in 4 weeks           Relevant Orders    Alpha fetoprotein, maternal         KOURTNEY Whitlock  12/16/2024  2:47 PM

## 2024-12-16 ENCOUNTER — ROUTINE PRENATAL (OUTPATIENT)
Dept: OBGYN CLINIC | Facility: CLINIC | Age: 34
End: 2024-12-16

## 2024-12-16 VITALS — SYSTOLIC BLOOD PRESSURE: 100 MMHG | BODY MASS INDEX: 33.66 KG/M2 | DIASTOLIC BLOOD PRESSURE: 64 MMHG | HEIGHT: 63 IN

## 2024-12-16 DIAGNOSIS — Z87.59 HISTORY OF POSTPARTUM DEPRESSION: ICD-10-CM

## 2024-12-16 DIAGNOSIS — F41.9 ANXIETY: ICD-10-CM

## 2024-12-16 DIAGNOSIS — Z86.59 HISTORY OF POSTPARTUM DEPRESSION: ICD-10-CM

## 2024-12-16 DIAGNOSIS — O34.219 PREVIOUS CESAREAN DELIVERY, ANTEPARTUM: ICD-10-CM

## 2024-12-16 DIAGNOSIS — B00.9 HSV (HERPES SIMPLEX VIRUS) INFECTION: ICD-10-CM

## 2024-12-16 DIAGNOSIS — Z34.92 PRENATAL CARE IN SECOND TRIMESTER: Primary | ICD-10-CM

## 2024-12-16 DIAGNOSIS — O21.9 NAUSEA AND VOMITING DURING PREGNANCY: ICD-10-CM

## 2024-12-16 PROCEDURE — PNV

## 2024-12-27 ENCOUNTER — APPOINTMENT (OUTPATIENT)
Dept: LAB | Facility: HOSPITAL | Age: 34
End: 2024-12-27
Payer: COMMERCIAL

## 2024-12-27 DIAGNOSIS — Z34.92 PRENATAL CARE IN SECOND TRIMESTER: ICD-10-CM

## 2024-12-27 PROCEDURE — 82105 ALPHA-FETOPROTEIN SERUM: CPT

## 2024-12-27 PROCEDURE — 36415 COLL VENOUS BLD VENIPUNCTURE: CPT

## 2024-12-29 ENCOUNTER — RESULTS FOLLOW-UP (OUTPATIENT)
Dept: OBGYN CLINIC | Facility: CLINIC | Age: 34
End: 2024-12-29

## 2024-12-29 LAB
2ND TRIMESTER 4 SCREEN SERPL-IMP: NORMAL
AFP ADJ MOM SERPL: 1.18
AFP INTERP AMN-IMP: NORMAL
AFP INTERP SERPL-IMP: NORMAL
AFP INTERP SERPL-IMP: NORMAL
AFP SERPL-MCNC: 44.1 NG/ML
AGE AT DELIVERY: 34.2 YR
GA METHOD: NORMAL
GA: 18.1 WEEKS
IDDM PATIENT QL: NO
MULTIPLE PREGNANCY: NO
NEURAL TUBE DEFECT RISK FETUS: 6916 %

## 2024-12-31 ENCOUNTER — TELEPHONE (OUTPATIENT)
Dept: OBGYN CLINIC | Facility: CLINIC | Age: 34
End: 2024-12-31

## 2024-12-31 NOTE — TELEPHONE ENCOUNTER
Left message for pt to call back to reschedule several appointments in 2025 due to providers out of office.

## 2025-01-07 NOTE — TELEPHONE ENCOUNTER
PT called back to reschedule appointments. I was not able to find an appointment the week of 3/10 for her 28 week visit. I did reschedule all other appointments that were noted. Please follow up regarding appointment.

## 2025-01-09 NOTE — PROGRESS NOTES
OB/GYN  PN Visit  Sara Cruz  [id number]  2025  3:46 PM  Fátima Robbins PA-C    S: 34 y.o.  20w0d here for PN visit. Pregnancy complicated by previous .     OB complaints:  Denies c/o h/a, no edema, no smoking, no DV.   No vb/lof  No cramping/ctxns or signs of PTL.    She reports that overall she is doing ok. She does endorse n/v still most days. She feel the vit B6 and Unisom can be beneficial, but that some days she feels she could benefit from additional tx. Rx sent for Zofran.     She is tearful today discussing previous birth and trying to determine plan for this delivery. We reviewed risks vs benefits of TOLAC vs RLTCS. Pt likely done having children after this but may consider one more pregnancy. She notes that with her first delivery she had ROM then FTP past 6 cm and  was advised with what sounds like cat 2 tracing.   She does also note some increased hip/pelvic pain this pregnancy. She noted significant hip pain last pregnancy and is worried that her sx may progress as she gets further along.     O:    Pre- Vitals    Flowsheet Row Most Recent Value   Prenatal Assessment    Fetal Heart Rate 150   Fundal Height (cm) 21 cm   Movement Present   Prenatal Vitals    Blood Pressure 112/74   Weight - Scale 84.4 kg (186 lb)   Urine Albumin/Glucose    Dilation/Effacement/Station    Vaginal Drainage    Draining Fluid No   Edema    LLE Edema None   RLE Edema None   Facial Edema None            Gen: no acute distress, nonlabored breathing.  OB exam completed: fundal height, +FHT.  Urine: -/-    A/P:  Problem List Items Addressed This Visit     20 weeks gestation of pregnancy - Primary    Overview:     Labs UTD  Pap smear: 23 wnl     GC/CT: 24 neg  Prenatal Panel: normal   Blood Type: A+ RhoGam not indicated   GBS: will plan at 36 weeks    Genetic Testing: AFP, NIPS normal    Vaccines:  Tdap: will review at 27 weeks  Flu: had this season     Birth Plan: TBD TOLAC vs RLTCS    Yellow packet to be given and consents signed at 30 weeks.   Feeding Plan: TBD  Breast pump: TBD if needed will order  Pediatrician: established  Contraception: considering POP then transition to nuva ring like she did after her initial pregnancy  Ultrasounds: anatomy scan planned tomorrow at [facility]         Nausea and vomiting in pregnancy    Continue vit B 6 as well as Unisom.   Added in Zofran 4 mg q 6 hours prn today for breakthrough sx.          Relevant Medications    ondansetron (ZOFRAN) 4 mg tablet         RTC in 4 weeks    Fátima Robbins PA-C  1/13/2025  3:46 PM

## 2025-01-13 ENCOUNTER — ROUTINE PRENATAL (OUTPATIENT)
Dept: OBGYN CLINIC | Facility: CLINIC | Age: 35
End: 2025-01-13

## 2025-01-13 VITALS
WEIGHT: 186 LBS | BODY MASS INDEX: 32.96 KG/M2 | HEIGHT: 63 IN | SYSTOLIC BLOOD PRESSURE: 112 MMHG | DIASTOLIC BLOOD PRESSURE: 74 MMHG

## 2025-01-13 DIAGNOSIS — Z3A.20 20 WEEKS GESTATION OF PREGNANCY: Primary | ICD-10-CM

## 2025-01-13 DIAGNOSIS — O21.9 NAUSEA AND VOMITING IN PREGNANCY: ICD-10-CM

## 2025-01-13 PROCEDURE — PNV: Performed by: PHYSICIAN ASSISTANT

## 2025-01-13 RX ORDER — ONDANSETRON 4 MG/1
4 TABLET, FILM COATED ORAL EVERY 8 HOURS PRN
Qty: 20 TABLET | Refills: 0 | Status: SHIPPED | OUTPATIENT
Start: 2025-01-13

## 2025-01-13 NOTE — ASSESSMENT & PLAN NOTE
Overview:     Labs UTD  Pap smear: 03/29/23 wnl     GC/CT: 11/18/24 neg  Prenatal Panel: normal   Blood Type: A+ RhoGam not indicated   GBS: will plan at 36 weeks    Genetic Testing: AFP, NIPS normal    Vaccines:  Tdap: will review at 27 weeks  Flu: had this season     Birth Plan: TBD TOLAC vs RLTCS   Yellow packet to be given and consents signed at 30 weeks.   Feeding Plan: TBD  Breast pump: TBD if needed will order  Pediatrician: established  Contraception: considering POP then transition to nuva ring like she did after her initial pregnancy  Ultrasounds: anatomy scan planned tomorrow at [facility]

## 2025-01-13 NOTE — ASSESSMENT & PLAN NOTE
Continue vit B 6 as well as Unisom.   Added in Zofran 4 mg q 6 hours prn today for breakthrough sx.

## 2025-01-14 ENCOUNTER — ROUTINE PRENATAL (OUTPATIENT)
Facility: HOSPITAL | Age: 35
End: 2025-01-14
Payer: COMMERCIAL

## 2025-01-14 VITALS
HEART RATE: 83 BPM | OXYGEN SATURATION: 98 % | SYSTOLIC BLOOD PRESSURE: 110 MMHG | HEIGHT: 63 IN | BODY MASS INDEX: 32.77 KG/M2 | DIASTOLIC BLOOD PRESSURE: 68 MMHG | WEIGHT: 184.97 LBS

## 2025-01-14 DIAGNOSIS — Z36.3 ENCOUNTER FOR ANTENATAL SCREENING FOR MALFORMATIONS: ICD-10-CM

## 2025-01-14 DIAGNOSIS — Z36.86 ENCOUNTER FOR ANTENATAL SCREENING FOR CERVICAL LENGTH: ICD-10-CM

## 2025-01-14 DIAGNOSIS — E66.89 OTHER OBESITY AFFECTING PREGNANCY, ANTEPARTUM: ICD-10-CM

## 2025-01-14 DIAGNOSIS — O99.210 OTHER OBESITY AFFECTING PREGNANCY, ANTEPARTUM: ICD-10-CM

## 2025-01-14 DIAGNOSIS — Z3A.20 20 WEEKS GESTATION OF PREGNANCY: Primary | ICD-10-CM

## 2025-01-14 PROCEDURE — 76817 TRANSVAGINAL US OBSTETRIC: CPT | Performed by: OBSTETRICS & GYNECOLOGY

## 2025-01-14 PROCEDURE — 76811 OB US DETAILED SNGL FETUS: CPT | Performed by: OBSTETRICS & GYNECOLOGY

## 2025-01-14 NOTE — PROGRESS NOTES
Ultrasound Probe Disinfection    A transvaginal ultrasound was performed.   Prior to use, disinfection was performed with High Level Disinfection Process ([email or web address]).  Probe serial number [id number] was used.    Sarah Camilo  01/14/25  2:41 PM

## 2025-01-16 PROBLEM — O99.210 OBESITY COMPLICATING PREGNANCY, CHILDBIRTH, OR PUERPERIUM, ANTEPARTUM: Status: ACTIVE | Noted: 2025-01-16

## 2025-01-16 NOTE — PROGRESS NOTES
This patient received  care under my supervision on 25 at 21w0d gestational age at [facility].  The note is contained in the ultrasound report located under OB Procedures tab in Epic.  Please call our office at [phone] with questions.  -Ita Toledo MD

## 2025-02-10 ENCOUNTER — TELEPHONE (OUTPATIENT)
Dept: OBGYN CLINIC | Facility: CLINIC | Age: 35
End: 2025-02-10

## 2025-02-10 ENCOUNTER — ROUTINE PRENATAL (OUTPATIENT)
Dept: OBGYN CLINIC | Facility: CLINIC | Age: 35
End: 2025-02-10

## 2025-02-10 VITALS
SYSTOLIC BLOOD PRESSURE: 124 MMHG | DIASTOLIC BLOOD PRESSURE: 62 MMHG | HEIGHT: 63 IN | BODY MASS INDEX: 33.66 KG/M2 | WEIGHT: 190 LBS

## 2025-02-10 DIAGNOSIS — Z86.59 HISTORY OF POSTPARTUM DEPRESSION: ICD-10-CM

## 2025-02-10 DIAGNOSIS — O34.219 PREVIOUS CESAREAN DELIVERY, ANTEPARTUM: ICD-10-CM

## 2025-02-10 DIAGNOSIS — B00.9 HSV (HERPES SIMPLEX VIRUS) INFECTION: ICD-10-CM

## 2025-02-10 DIAGNOSIS — Z87.59 HISTORY OF POSTPARTUM DEPRESSION: ICD-10-CM

## 2025-02-10 DIAGNOSIS — Z34.92 PRENATAL CARE IN SECOND TRIMESTER: ICD-10-CM

## 2025-02-10 DIAGNOSIS — Z3A.24 24 WEEKS GESTATION OF PREGNANCY: Primary | ICD-10-CM

## 2025-02-10 PROCEDURE — PNV: Performed by: PHYSICIAN ASSISTANT

## 2025-02-10 NOTE — TELEPHONE ENCOUNTER
8am unavailable for scheduling 5/22/25 as requested, at this time per D.S @ L&D via ESC. Patient scheduled for RLTCS 5/22/25 at 10am.   Placed call to review and confirm scheduling details or see if pt has alternative preference as 8am unavailable on requested date. Left non detailed message on machine requesting return call to office.

## 2025-02-10 NOTE — TELEPHONE ENCOUNTER
Patient returned call, reviewed RLTCS 5/22/25 10am scheduled as next available. Patient confirmed scheduling. Patient aware hospital will call day prior to confirm arrival time and to review pre procedure instructions. Patient verbalized understanding to maintain prenatal appointments with office as scheduled. Patient had no additional questions at this time, aware to call office if questions/concerns before next scheduled appt in office. Calendar and chart updated, staff message sent to provider.

## 2025-02-10 NOTE — TELEPHONE ENCOUNTER
----- Message from Fátima Robbins PA-C sent at 2/10/2025 12:26 PM EST -----  HI!  Pt would like to scheduled RLTCS 5/22/25. If the 8 am spot is available that would be great.   Thanks!  Fátima

## 2025-02-10 NOTE — PROGRESS NOTES
OB/GYN  PN Visit  Sara Cruz  [id number]  2/10/2025  12:30 PM  Fátima Robbins PA-C    S: 34 y.o.  24w4d here for PN visit. Pregnancy complicated by previous .     OB complaints:  Denies c/o n/v/ha, no edema, no smoking, no DV.   No vb/lof  No cramping/ctxns or signs of PTL.    She reports that overall she is feeling well. They have decided to plan a repeat .   Will plan at 39 weeks.     O:    Pre-Scooby Vitals    Flowsheet Row Most Recent Value   Prenatal Assessment    Fetal Heart Rate 150   Fundal Height (cm) 24 cm   Movement Present   Prenatal Vitals    Blood Pressure 124/62   Weight - Scale 86.2 kg (190 lb)   Urine Albumin/Glucose    Dilation/Effacement/Station    Vaginal Drainage    Draining Fluid No   Edema    LLE Edema None   RLE Edema None   Facial Edema None            Gen: no acute distress, nonlabored breathing.  OB exam completed: fundal height, +FHT.  Urine: -/-    A/P:    1. 24 weeks gestation of pregnancy  Overview:  Overview:      Labs UTD  Pap smear: 23 wnl     GC/CT: 24 neg  Prenatal Panel: normal   Blood Type: A+ RhoGam not indicated   GBS: will plan at 36 weeks     Genetic Testing: AFP, NIPS normal     Vaccines:  Tdap: will review at 27 weeks  Flu: had this season      Birth Plan: TBD TOLAC vs RLTCS   Yellow packet to be given and consents signed at 30 weeks.   Feeding Plan: TBD  Breast pump: TBD if needed will order  Pediatrician: established  Contraception: considering POP then transition to nuva ring like she did after her initial pregnancy  Ultrasounds: anatomy scan planned tomorrow at [facility]  Orders:  -     RPR-Syphilis Screening (Total Syphilis IGG/IGM); Future  -     CBC and differential; Future; Expected date: 02/10/2025  -     Glucose, 1H PG; Future  -     RPR (DX) W/REFL TITER AND CONFIRM TESTING (REFL); Future  -     CBC and differential  -     Glucose, 1H PG  2. Previous  delivery, antepartum  Overview:  SROM, pit augment, dilated to 6 cm,  fetal intolerance   3. History of postpartum depression  Overview:  Will closely watch EPDS  4. HSV (herpes simplex virus) infection  Overview:  oral  5. Prenatal care in second trimester  Overview:  - Continue PNV  - Labor precautions reviewed  - Fetal movement reviewed  - Labs: advised to complete  -Pap: 2023 WNL  -Rh: unknown  - Genetics: MFM appt 11/15  - Ultrasounds: Level II US scheduled  - Tdap: offer at 28 weeks  - Flu Shot: received at work. 11/6/24  - RSV:  Will offer during season (9/1-1/31) @ [id number]   - Rhogam: unknown  - Delivery: TBD  - Contraception: TBD  - Breastfeeding: TBD  - Pediatrician: TBD  -Delivery Consent & Packet: 30 weeks  -GBS: at 36 weeks  - RTO in 4 weeks                RTC in 4 weeks    Fátima Robbins PA-C  2/10/2025  12:30 PM

## 2025-02-26 ENCOUNTER — APPOINTMENT (OUTPATIENT)
Dept: LAB | Facility: HOSPITAL | Age: 35
End: 2025-02-26
Payer: COMMERCIAL

## 2025-02-26 ENCOUNTER — RESULTS FOLLOW-UP (OUTPATIENT)
Dept: LABOR AND DELIVERY | Facility: HOSPITAL | Age: 35
End: 2025-02-26

## 2025-02-26 DIAGNOSIS — Z3A.24 24 WEEKS GESTATION OF PREGNANCY: Primary | ICD-10-CM

## 2025-02-26 LAB
BASOPHILS # BLD AUTO: 0.03 THOUSANDS/ÂΜL (ref 0–0.1)
BASOPHILS NFR BLD AUTO: 0 % (ref 0–1)
EOSINOPHIL # BLD AUTO: 0.05 THOUSAND/ÂΜL (ref 0–0.61)
EOSINOPHIL NFR BLD AUTO: 1 % (ref 0–6)
ERYTHROCYTE [DISTWIDTH] IN BLOOD BY AUTOMATED COUNT: 12.7 % (ref 11.6–15.1)
GLUCOSE 1H P 50 G GLC PO SERPL-MCNC: 128 MG/DL (ref 70–134)
HCT VFR BLD AUTO: 34.7 % (ref 34.8–46.1)
HGB BLD-MCNC: 11.4 G/DL (ref 11.5–15.4)
IMM GRANULOCYTES # BLD AUTO: 0.06 THOUSAND/UL (ref 0–0.2)
IMM GRANULOCYTES NFR BLD AUTO: 1 % (ref 0–2)
LYMPHOCYTES # BLD AUTO: 1.31 THOUSANDS/ÂΜL (ref 0.6–4.47)
LYMPHOCYTES NFR BLD AUTO: 14 % (ref 14–44)
MCH RBC QN AUTO: 30.5 PG (ref 26.8–34.3)
MCHC RBC AUTO-ENTMCNC: 32.9 G/DL (ref 31.4–37.4)
MCV RBC AUTO: 93 FL (ref 82–98)
MONOCYTES # BLD AUTO: 0.46 THOUSAND/ÂΜL (ref 0.17–1.22)
MONOCYTES NFR BLD AUTO: 5 % (ref 4–12)
NEUTROPHILS # BLD AUTO: 7.25 THOUSANDS/ÂΜL (ref 1.85–7.62)
NEUTS SEG NFR BLD AUTO: 79 % (ref 43–75)
NRBC BLD AUTO-RTO: 0 /100 WBCS
PLATELET # BLD AUTO: 214 THOUSANDS/UL (ref 149–390)
PMV BLD AUTO: 11.8 FL (ref 8.9–12.7)
RBC # BLD AUTO: 3.74 MILLION/UL (ref 3.81–5.12)
WBC # BLD AUTO: 9.16 THOUSAND/UL (ref 4.31–10.16)

## 2025-02-26 PROCEDURE — 86780 TREPONEMA PALLIDUM: CPT

## 2025-02-26 PROCEDURE — 36415 COLL VENOUS BLD VENIPUNCTURE: CPT

## 2025-02-26 PROCEDURE — 82950 GLUCOSE TEST: CPT

## 2025-02-26 PROCEDURE — 85025 COMPLETE CBC W/AUTO DIFF WBC: CPT

## 2025-02-27 LAB — TREPONEMA PALLIDUM IGG+IGM AB [PRESENCE] IN SERUM OR PLASMA BY IMMUNOASSAY: NORMAL

## 2025-03-05 NOTE — PROGRESS NOTES
OB/GYN  PN Visit  Sara Cruz  [id number]  3/10/2025  3:19 PM  AprilKOURTNEY Rivera    S: 34 y.o.  28w4d here for PN visit.   She denies contractions. She denies leakage of fluid and vaginal bleeding.   She endorses good fetal movement.   She nausea (+), vomiting (+) while brushing teeth. Denies headache, cramping, edema, and smoking.   Her pregnancy is complicated by previous C/S.    She reports right sided hip pain, has had chronic hip pain prior to pregnancy. Was in PT prior to pregnancy for right hip pain.     O:  Pre- Vitals      Flowsheet Row Most Recent Value   Prenatal Assessment    Fetal Heart Rate 140   Fundal Height (cm) 28 cm   Movement Present   Prenatal Vitals    Blood Pressure 108/62   Weight - Scale 87.6 kg (193 lb 3.2 oz)   Urine Albumin/Glucose    Dilation/Effacement/Station    Vaginal Drainage    Edema           Wt=87.6 kg (193 lb 3.2 oz); Body mass index is 34.22 kg/m².; TWG=5.987 kg (13 lb 3.2 oz)  Physical Exam    General: Well appearing, no distress  Respiratory: Unlabored breathing  Abdomen: Soft, gravid, nontender  Fundal Height: Appropriate for gestational age.   Extremities: Warm and well perfused.  Non tender.  OB examcompleted: fundal height, +FHT.  Urine: -/-     A/P:    Problem List Items Addressed This Visit       History of postpartum depression - Primary    On wellbutrin and prozac  Involved in talk therapy  Will closely watch EPDS         HSV (herpes simplex virus) infection    Oral HSV         Previous  delivery, antepartum    Repeat C/S scheduled 25         Prenatal care in third trimester    - Continue PNV  - Labor precautions reviewed  - Fetal movement reviewed  - Labs: UTD, third tri labs WNL. Passed one hour GTT  -Pap:  WNL  -Rh: A+  - Genetics:NIP WNL  - Ultrasounds: third tri at 32 weeks  - Tdap: 3/10/2025  - Flu Shot: received at work. 24  - Rhogam: n/a  - Delivery: C/S on 25  - Contraception: minipill then nuvaring   -  Breastfeeding: yes, pump ordered   - Pediatrician:  established  -Delivery Consent & Packet: signed 3/10/2025  -GBS: at 36 weeks  - RTO in 2 weeks           28 weeks gestation of pregnancy     Other Visit Diagnoses         Pregnancy related hip pain in third trimester, antepartum        Relevant Orders    Ambulatory Referral to Physical Therapy          - Third trimester packet provided and reviewed:   - Birth room rules and acknowledgement, birth plan, authorization for release of protected health information for photographers and birth certificate/SS worksheet to be brought to hospital at time of delivery.   -Delivery Consent signed today 3/10/2025.       KOURTNEY Pinto  3/10/2025  3:19 PM

## 2025-03-05 NOTE — ASSESSMENT & PLAN NOTE
- Continue PNV  - Labor precautions reviewed  - Fetal movement reviewed  - Labs: UTD, third tri labs WNL. Passed one hour GTT  -Pap: 2023 WNL  -Rh: A+  - Genetics:NIP WNL  - Ultrasounds: third tri at 32 weeks  - Tdap: 3/10/2025  - Flu Shot: received at work. 11/6/24  - Rhogam: n/a  - Delivery: C/S on 5/22/25  - Contraception: minipill then nuvaring   - Breastfeeding: yes, pump ordered   - Pediatrician:  established  -Delivery Consent & Packet: signed 3/10/2025  -GBS: at 36 weeks  - RTO in 2 weeks

## 2025-03-10 ENCOUNTER — ROUTINE PRENATAL (OUTPATIENT)
Dept: OBGYN CLINIC | Facility: CLINIC | Age: 35
End: 2025-03-10
Payer: COMMERCIAL

## 2025-03-10 VITALS — SYSTOLIC BLOOD PRESSURE: 108 MMHG | WEIGHT: 193.2 LBS | BODY MASS INDEX: 34.22 KG/M2 | DIASTOLIC BLOOD PRESSURE: 62 MMHG

## 2025-03-10 DIAGNOSIS — O34.219 PREVIOUS CESAREAN DELIVERY, ANTEPARTUM: ICD-10-CM

## 2025-03-10 DIAGNOSIS — B00.9 HSV (HERPES SIMPLEX VIRUS) INFECTION: ICD-10-CM

## 2025-03-10 DIAGNOSIS — Z86.59 HISTORY OF POSTPARTUM DEPRESSION: Primary | ICD-10-CM

## 2025-03-10 DIAGNOSIS — Z23 ENCOUNTER FOR IMMUNIZATION: ICD-10-CM

## 2025-03-10 DIAGNOSIS — Z34.93 PRENATAL CARE IN THIRD TRIMESTER: ICD-10-CM

## 2025-03-10 DIAGNOSIS — Z87.59 HISTORY OF POSTPARTUM DEPRESSION: Primary | ICD-10-CM

## 2025-03-10 DIAGNOSIS — M25.559 PREGNANCY RELATED HIP PAIN IN THIRD TRIMESTER, ANTEPARTUM: ICD-10-CM

## 2025-03-10 DIAGNOSIS — O26.893 PREGNANCY RELATED HIP PAIN IN THIRD TRIMESTER, ANTEPARTUM: ICD-10-CM

## 2025-03-10 DIAGNOSIS — Z3A.28 28 WEEKS GESTATION OF PREGNANCY: ICD-10-CM

## 2025-03-10 PROCEDURE — 90715 TDAP VACCINE 7 YRS/> IM: CPT

## 2025-03-10 PROCEDURE — PNV

## 2025-03-10 PROCEDURE — 90471 IMMUNIZATION ADMIN: CPT

## 2025-03-10 RX ORDER — BUPROPION HYDROCHLORIDE 150 MG/1
1 TABLET ORAL DAILY
COMMUNITY
Start: 2025-02-07

## 2025-03-11 ENCOUNTER — TELEPHONE (OUTPATIENT)
Dept: OBGYN CLINIC | Facility: CLINIC | Age: 35
End: 2025-03-11

## 2025-03-11 NOTE — TELEPHONE ENCOUNTER
LMOM to reschedule appt on 3/21 due to provider not in office.  Pt can be put through to office to reschedule.

## 2025-03-14 LAB
DME PARACHUTE DELIVERY DATE REQUESTED: NORMAL
DME PARACHUTE ITEM DESCRIPTION: NORMAL
DME PARACHUTE ORDER STATUS: NORMAL
DME PARACHUTE SUPPLIER NAME: NORMAL
DME PARACHUTE SUPPLIER PHONE: NORMAL

## 2025-03-31 LAB
DME PARACHUTE DELIVERY DATE ACTUAL: NORMAL
DME PARACHUTE DELIVERY DATE REQUESTED: NORMAL
DME PARACHUTE ITEM DESCRIPTION: NORMAL
DME PARACHUTE ORDER STATUS: NORMAL
DME PARACHUTE SUPPLIER NAME: NORMAL
DME PARACHUTE SUPPLIER PHONE: NORMAL

## 2025-04-01 NOTE — PROGRESS NOTES
OB/GYN  PN Visit  Sara Cruz  [id number]  2025  9:35 AM  Fátima Robbins PA-C    S: 35 y.o.  31w5d here for PN visit. Pregnancy complicated by obesity and previous LTCS, h/o oral HSV.     OB complaints:  Denies c/o n/v/ha, no edema, no smoking, no DV.   No vb/lof  No cramping/ctxns or signs of PTL.    She reports that overall she is doing ok. She notes that she is still dealing with some sciatic pain but unsure if she will plan to proceed w PT. May consider virtual visit she can do through her insurance.   Otherwise baby is moving well, no vb/lof.   She has delivery set and is doing well overall.       O:    Pre- Vitals    Flowsheet Row Most Recent Value   Prenatal Assessment    Fetal Heart Rate 130   Fundal Height (cm) 33 cm   Movement Present   Prenatal Vitals    Blood Pressure 100/64   Weight - Scale 88 kg (194 lb)   Urine Albumin/Glucose    Dilation/Effacement/Station    Vaginal Drainage    Draining Fluid No   Edema    LLE Edema None   RLE Edema None   Facial Edema None            Gen: no acute distress, nonlabored breathing.  OB exam completed: fundal height, +FHT.  Urine: -/-    A/P:    1. Prenatal care, subsequent pregnancy in third trimester  Overview:  - Continue PNV  - Labor precautions reviewed  - Fetal movement reviewed  - Labs: advised to complete  -Pap:  WNL  -Rh: unknown  - Genetics: MFM appt 11/15  - Ultrasounds: Level II US scheduled  - Tdap: offer at 28 weeks  - Flu Shot: received at work. 24  - Rhogam: NA  - Delivery: RLTCS  - Contraception: considering POP then transition to nuva ring like she did after her initial pregnancy  - Breastfeeding: yes, she has ordered and received pump  - Pediatrician: established   -Delivery Consent & Packet: done at 30 weeks  -GBS: at 36 weeks  - RTO in 2 weeks  2. History of postpartum depression  Overview:  Will closely watch EPDS  3. HSV (herpes simplex virus) infection  Overview:  oral  4. Previous  delivery,  antepartum  Overview:  SROM, pit augment, dilated to 6 cm, fetal intolerance with first pregnancy.   Plans RLTCS  5. 31 weeks gestation of pregnancy      RTC in 2 weeks    Fátima Robbins PA-C  4/2/2025  9:35 AM

## 2025-04-02 ENCOUNTER — ROUTINE PRENATAL (OUTPATIENT)
Dept: OBGYN CLINIC | Facility: CLINIC | Age: 35
End: 2025-04-02

## 2025-04-02 VITALS
SYSTOLIC BLOOD PRESSURE: 100 MMHG | WEIGHT: 194 LBS | OXYGEN SATURATION: 99 % | DIASTOLIC BLOOD PRESSURE: 64 MMHG | BODY MASS INDEX: 34.37 KG/M2 | HEART RATE: 93 BPM

## 2025-04-02 DIAGNOSIS — Z3A.31 31 WEEKS GESTATION OF PREGNANCY: ICD-10-CM

## 2025-04-02 DIAGNOSIS — Z86.59 HISTORY OF POSTPARTUM DEPRESSION: ICD-10-CM

## 2025-04-02 DIAGNOSIS — Z34.83 PRENATAL CARE, SUBSEQUENT PREGNANCY IN THIRD TRIMESTER: Primary | ICD-10-CM

## 2025-04-02 DIAGNOSIS — B00.9 HSV (HERPES SIMPLEX VIRUS) INFECTION: ICD-10-CM

## 2025-04-02 DIAGNOSIS — Z87.59 HISTORY OF POSTPARTUM DEPRESSION: ICD-10-CM

## 2025-04-02 DIAGNOSIS — O34.219 PREVIOUS CESAREAN DELIVERY, ANTEPARTUM: ICD-10-CM

## 2025-04-02 PROCEDURE — PNV: Performed by: PHYSICIAN ASSISTANT

## 2025-04-03 PROBLEM — Z3A.32 32 WEEKS GESTATION OF PREGNANCY: Status: ACTIVE | Noted: 2025-01-13

## 2025-04-03 NOTE — ASSESSMENT & PLAN NOTE
Pregnant women with a BMI>30 ideally should aim for maximum weight gain of 11-20 pounds ideally via healthy diet and regular exercise.   Maternal BMI above 30 in pregnancy confers increased risks of preeclampsia, GDM, cardiac dysfunction, sleep apnea,  delivery, and VTE, and fetal risks include miscarriage, fetal anomalies (along with reduced detection), and stillbirth, macrosomia and impaired growth.   Growth assessment is advised in the third trimester.   For women with prepregnancy BMI greater than 35, we recommend weekly antepartum fetal surveillance beginning at 37 weeks to continue until delivery.    TWG expected: 5 kg (11 lb)-9 kg (19 lb)  TW.711 kg (17 lb)

## 2025-04-03 NOTE — PROGRESS NOTES
OB/GYN  PN Visit  Sara Cruz  [id number]  2025  2:41 PM  Dr. Liana Granado MD    S: 35 y.o.  32w4d here for PN visit. She denies contractions. She denies leakage of fluid and vaginal bleeding. She reports good fetal movement. She reports daily nausea and vomiting. She reports occasional headaches. She denies cramping, edema, domestic violence, and smoking. Her pregnancy is complicated by anxiety/ hx PPD, hx C/S, BMI >30.     O:  Pre-Scooby Vitals      Flowsheet Row Most Recent Value   Prenatal Assessment    Fetal Heart Rate 144   Fundal Height (cm) 32 cm   Movement Present   Presentation Breech  [by US today]   Prenatal Vitals    Blood Pressure 102/64   Weight - Scale 89.4 kg (197 lb)   Urine Albumin/Glucose    Dilation/Effacement/Station    Vaginal Drainage    Draining Fluid No   Edema    LLE Edema None   RLE Edema None          Physical Exam  Vitals reviewed.   Constitutional:       General: She is not in acute distress.     Appearance: Normal appearance. She is well-developed. She is not ill-appearing, toxic-appearing or diaphoretic.   Cardiovascular:      Rate and Rhythm: Normal rate.   Pulmonary:      Effort: Pulmonary effort is normal. No respiratory distress.   Abdominal:      General: There is no distension.      Palpations: Abdomen is soft. There is no mass.      Tenderness: There is no abdominal tenderness. There is no guarding or rebound.   Genitourinary:     Comments: Gravid, nontender  Skin:     General: Skin is warm and dry.   Neurological:      Mental Status: She is alert and oriented to person, place, and time.   Psychiatric:         Mood and Affect: Mood normal.         Behavior: Behavior normal.       Assessment & Plan  32 weeks gestation of pregnancy  - Continue PNV  - Labor precautions reviewed  - Fetal kick counts reviewed  - Labs: UTD  - Genetics: NIPT neg; MSAFP WNL  - Ultrasounds: Level II US wnl on 25; Patient had US earlier this afternoon: EFW 79% with  AC 98% & mild polyhydramnios; Repeat GILMAR in 4 weeks due to polyhydramnios (25)  - Tdap: Given 3/10/25  - Flu Shot: Done 24  - RSV: Will offer during season (-) @ [id number]   - COVID: Vaccinated; had COVID infection in pregnancy  - Rhogam: N/A  - Delivery: Scheduled for RLTCS w/ Dr. Ragland 25  - Contraception: POPs (use previously) -> Nuvaring after breastfeeding  - Infant feeding: Breast; Pump recieved  - Pediatrician: Mati epperson  - RTO in 2 weeks       Anxiety  Continue Prozac and wellbutrin       History of postpartum depression  F/U PP EPDS  Continue Prozac and wellbutrin       Obesity affecting pregnancy, antepartum, unspecified obesity type  Pregnant women with a BMI>30 ideally should aim for maximum weight gain of 11-20 pounds ideally via healthy diet and regular exercise.   Maternal BMI above 30 in pregnancy confers increased risks of preeclampsia, GDM, cardiac dysfunction, sleep apnea,  delivery, and VTE, and fetal risks include miscarriage, fetal anomalies (along with reduced detection), and stillbirth, macrosomia and impaired growth.   Growth assessment is advised in the third trimester.   For women with prepregnancy BMI greater than 35, we recommend weekly antepartum fetal surveillance beginning at 37 weeks to continue until delivery.    TWG expected: 5 kg (11 lb)-9 kg (19 lb)  TW.711 kg (17 lb)       Previous  delivery, antepartum  SROM, pit augment, dilated to 6 cm, fetal intolerance with first pregnancy.   Plans RLTCS       Polyhydramnios in third trimester complication, single or unspecified fetus  AMNIOTIC FLUID     Q1: 6.1      Q2: 5.9      Q3: 8.3      Q4: 6.3  GILMAR Total = 26.5 cm  Amniotic Fluid: POLYHYDRAMNIOS  Repeat GILMAR in 4 weeks (25)       Breech presentation, single or unspecified fetus  On US today  Planning RLTCS           Future Appointments   Date Time Provider Department Center   2025  2:30 PM Madina Jacques MD [facility]  [facility]   2025  9:45 AM  US 4 [facility]EE [facility]   2025  9:15 AM Madina Jacques MD  OBGYN [facility]   2025  1:45 PM Zaira Ragland MD [facility]   2025  3:45 PM KOURTNEY Pinto [facility]   2025  1:45 PM KOURTNEY Whitlock [facility]   2025 12:00 PM KOURTNEY Ortega [facility] Christina Granado MD  2025  2:41 PM

## 2025-04-03 NOTE — ASSESSMENT & PLAN NOTE
- Continue PNV  - Labor precautions reviewed  - Fetal kick counts reviewed  - Labs: UTD  - Genetics: NIPT neg; MSAFP WNL  - Ultrasounds: Level II US wnl on 1/14/25; Patient had US earlier this afternoon: EFW 79% with AC 98% & mild polyhydramnios; Repeat GILMAR in 4 weeks due to polyhydramnios (5/8/25)  - Tdap: Given 3/10/25  - Flu Shot: Done 11/6/24  - RSV: Will offer during season (9/1-1/31) @ [id number]-74x7r   - COVID: Vaccinated; had COVID infection in pregnancy  - Rhogam: N/A  - Delivery: Scheduled for RLTCS w/ Dr. Ragland 5/22/25  - Contraception: POPs (use previously) -> Nuvaring after breastfeeding  - Infant feeding: Breast; Pump recieved  - Pediatrician: Mati epperson  - RTO in 2 weeks

## 2025-04-07 ENCOUNTER — ULTRASOUND (OUTPATIENT)
Facility: HOSPITAL | Age: 35
End: 2025-04-07
Payer: COMMERCIAL

## 2025-04-07 ENCOUNTER — ROUTINE PRENATAL (OUTPATIENT)
Dept: OBGYN CLINIC | Facility: CLINIC | Age: 35
End: 2025-04-07

## 2025-04-07 VITALS
WEIGHT: 197 LBS | SYSTOLIC BLOOD PRESSURE: 102 MMHG | BODY MASS INDEX: 34.91 KG/M2 | DIASTOLIC BLOOD PRESSURE: 64 MMHG | HEIGHT: 63 IN

## 2025-04-07 VITALS
SYSTOLIC BLOOD PRESSURE: 108 MMHG | WEIGHT: 195.6 LBS | HEART RATE: 103 BPM | DIASTOLIC BLOOD PRESSURE: 50 MMHG | HEIGHT: 63 IN | BODY MASS INDEX: 34.66 KG/M2 | OXYGEN SATURATION: 98 %

## 2025-04-07 DIAGNOSIS — O40.3XX0 POLYHYDRAMNIOS IN THIRD TRIMESTER COMPLICATION, SINGLE OR UNSPECIFIED FETUS: ICD-10-CM

## 2025-04-07 DIAGNOSIS — Z3A.32 32 WEEKS GESTATION OF PREGNANCY: ICD-10-CM

## 2025-04-07 DIAGNOSIS — O99.210 OBESITY AFFECTING PREGNANCY, ANTEPARTUM, UNSPECIFIED OBESITY TYPE: ICD-10-CM

## 2025-04-07 DIAGNOSIS — O34.219 PREVIOUS CESAREAN DELIVERY, ANTEPARTUM: Primary | ICD-10-CM

## 2025-04-07 DIAGNOSIS — O99.613 GASTROESOPHAGEAL REFLUX DURING PREGNANCY IN THIRD TRIMESTER, ANTEPARTUM: ICD-10-CM

## 2025-04-07 DIAGNOSIS — O40.3XX0 POLYHYDRAMNIOS AFFECTING PREGNANCY IN THIRD TRIMESTER: ICD-10-CM

## 2025-04-07 DIAGNOSIS — Z86.59 HISTORY OF POSTPARTUM DEPRESSION: ICD-10-CM

## 2025-04-07 DIAGNOSIS — F41.9 ANXIETY: ICD-10-CM

## 2025-04-07 DIAGNOSIS — Z87.59 HISTORY OF POSTPARTUM DEPRESSION: ICD-10-CM

## 2025-04-07 DIAGNOSIS — K21.9 GASTROESOPHAGEAL REFLUX DURING PREGNANCY IN THIRD TRIMESTER, ANTEPARTUM: ICD-10-CM

## 2025-04-07 PROCEDURE — PNV: Performed by: OBSTETRICS & GYNECOLOGY

## 2025-04-07 PROCEDURE — 99214 OFFICE O/P EST MOD 30 MIN: CPT | Performed by: OBSTETRICS & GYNECOLOGY

## 2025-04-07 PROCEDURE — 76816 OB US FOLLOW-UP PER FETUS: CPT | Performed by: OBSTETRICS & GYNECOLOGY

## 2025-04-07 RX ORDER — FAMOTIDINE 20 MG/1
20 TABLET, FILM COATED ORAL 2 TIMES DAILY
Qty: 60 TABLET | Refills: 2 | Status: SHIPPED | OUTPATIENT
Start: 2025-04-07

## 2025-04-07 NOTE — PROGRESS NOTES
Sara Cruz has times of reflux and nausea and vomiting more in the morning than the rest of the day.  She reports regular fetal movements and does not report any pregnancy problems.  She is here today at 32w4d for an ultrasound for fetal growth.  Weight gain in pregnancy was 15 pounds and her Glucola was normal.    Problem list:  History of a  and is planning on a repeat .    Ultrasound findings:  The ultrasound today shows normal interval fetal growth overall.  Discussed that the fetal abdomen is measuring in the 98th percentile.  GILMAR is 26.5 suggesting polyhydramnios.    Pregnancy ultrasound has limitations and is unable to detect all forms of fetal congenital abnormalities.  The inaccuracy in the EFW can be off by 1 lb either way in the third trimester.    Specific counseling was provided on the following problems:  1.  The causes for polyhydramnios can be idiopathic or secondary to a large baby versus maternal diabetes in pregnancy versus caused by a viral illness or GI obstruction, aneuploidy or poor fetal swallowing.  The most common reason I foresee would be idiopathic or large baby as I see no other sign for viral illness, aneuploidy, GI obstruction or poor fetal swallowing.     Follow up recommended:   Recommend a follow-up GILMAR in 4 weeks as fluid amounts in pregnancy usually decrease after 34 weeks.  No further growth scans are recommended since she is planning on a repeat .  Encouraged her to start Pepcid for her reflux which may also help with her nausea and vomiting.  A prescription was sent to her pharmacy.    Pre visit time reviewing her records   3 minutes  Face to face time 7 minutes  Post visit time on documentation of note, updating her problem list, adding orders and prescriptions 10 minutes.  Procedures that were completed today were charged separately.   The level of decision making was low level complexity.    Zahra Acevedo MD

## 2025-04-07 NOTE — LETTER
2025     Madina Jacques MD  [address] [facility] [id number]    Patient: Sara Cruz   YOB: 1990   Date of Visit: 2025       Dear Dr. Madina Jacques MD:    Thank you for referring Sara Cruz to me for evaluation. Below are my notes for this consultation.    If you have questions, please do not hesitate to call me. I look forward to following your patient along with you.         Sincerely,        Zahra Acevedo MD        CC: No Recipients    Zahra Acevedo MD  2025 10:23 PM  Sign when Signing Visit  Sara Cruz has times of reflux and nausea and vomiting more in the morning than the rest of the day.  She reports regular fetal movements and does not report any pregnancy problems.  She is here today at 32w4d for an ultrasound for fetal growth.  Weight gain in pregnancy was 15 pounds and her Glucola was normal.    Problem list:  History of a  and is planning on a repeat .    Ultrasound findings:  The ultrasound today shows normal interval fetal growth overall.  Discussed that the fetal abdomen is measuring in the 98th percentile.  GILMAR is 26.5 suggesting polyhydramnios.    Pregnancy ultrasound has limitations and is unable to detect all forms of fetal congenital abnormalities.  The inaccuracy in the EFW can be off by 1 lb either way in the third trimester.    Specific counseling was provided on the following problems:  1.  The causes for polyhydramnios can be idiopathic or secondary to a large baby versus maternal diabetes in pregnancy versus caused by a viral illness or GI obstruction, aneuploidy or poor fetal swallowing.  The most common reason I foresee would be idiopathic or large baby as I see no other sign for viral illness, aneuploidy, GI obstruction or poor fetal swallowing.     Follow up recommended:   Recommend a follow-up GILMAR in 4 weeks as fluid amounts in pregnancy usually decrease after 34 weeks.  No further growth  scans are recommended since she is planning on a repeat .  Encouraged her to start Pepcid for her reflux which may also help with her nausea and vomiting.  A prescription was sent to her pharmacy.    Pre visit time reviewing her records   3 minutes  Face to face time 7 minutes  Post visit time on documentation of note, updating her problem list, adding orders and prescriptions 10 minutes.  Procedures that were completed today were charged separately.   The level of decision making was low level complexity.    Zahra Acevedo MD

## 2025-04-16 ENCOUNTER — TELEPHONE (OUTPATIENT)
Age: 35
End: 2025-04-16

## 2025-04-16 NOTE — TELEPHONE ENCOUNTER
Attempted to contact patient for 3rd Trimester Check-in Call. Pt unavailable. Certain msg was sent  4/16/25.  Left msg to reach out to office w/questions or concerns.

## 2025-04-17 ENCOUNTER — TELEPHONE (OUTPATIENT)
Facility: HOSPITAL | Age: 35
End: 2025-04-17

## 2025-04-17 NOTE — TELEPHONE ENCOUNTER
Patient called and would like to change visit to 5/8  states her voicemail said it was for later in the day in the [place] office, attempted to warm transfer with no answer, please call patient to schedule.

## 2025-04-17 NOTE — TELEPHONE ENCOUNTER
Unable to reach patient by phone, left voicemail explaining our [facility] office has had a slight change in scheduling. We rescheduled her appointment to May 8 @ 9:15 a.m. at our [facility] located at [address] [place] [address]. If this is not convenient, please contact our office at [phone].    We do apologize for any inconvenience.      M

## 2025-04-25 ENCOUNTER — ROUTINE PRENATAL (OUTPATIENT)
Dept: OBGYN CLINIC | Facility: CLINIC | Age: 35
End: 2025-04-25

## 2025-04-25 ENCOUNTER — TELEPHONE (OUTPATIENT)
Age: 35
End: 2025-04-25

## 2025-04-25 VITALS — WEIGHT: 198 LBS | BODY MASS INDEX: 35.07 KG/M2 | DIASTOLIC BLOOD PRESSURE: 68 MMHG | SYSTOLIC BLOOD PRESSURE: 108 MMHG

## 2025-04-25 DIAGNOSIS — Z87.59 HISTORY OF POSTPARTUM DEPRESSION: Primary | ICD-10-CM

## 2025-04-25 DIAGNOSIS — Z86.59 HISTORY OF POSTPARTUM DEPRESSION: Primary | ICD-10-CM

## 2025-04-25 DIAGNOSIS — B00.9 HSV (HERPES SIMPLEX VIRUS) INFECTION: ICD-10-CM

## 2025-04-25 DIAGNOSIS — O34.219 PREVIOUS CESAREAN DELIVERY, ANTEPARTUM: ICD-10-CM

## 2025-04-25 DIAGNOSIS — Z3A.32 32 WEEKS GESTATION OF PREGNANCY: ICD-10-CM

## 2025-04-25 DIAGNOSIS — Z34.83 PRENATAL CARE, SUBSEQUENT PREGNANCY IN THIRD TRIMESTER: ICD-10-CM

## 2025-04-25 PROCEDURE — PNV: Performed by: STUDENT IN AN ORGANIZED HEALTH CARE EDUCATION/TRAINING PROGRAM

## 2025-04-25 NOTE — PROGRESS NOTES
OB/GYN  PN Visit  Sara Cruz  [id number]  2025  2:48 PM  Madina Jacques MD    S: 35 y.o.  35w1d here for PN visit- she denies ny CTX. LOF or VB. She endorses good FM. Urine neg/neg. She has some continued N/V in AM and headaches that respond to treatment with no other preeclamptic symptoms.     O:  Vitals:    25 1400   BP: 108/68     A/P:  #1. 35w1d GESTATION   Continue PNV  - Labor precautions reviewed  - Fetal kick counts reviewed  - Labs: UTD  - Genetics: NIPT neg; MSAFP WNL  - Ultrasounds: Level II US wnl on 25; Patient had US earlier this afternoon: EFW 79% with AC 98% & mild polyhydramnios; Repeat GILMAR in 4 weeks due to polyhydramnios (25)  - Tdap: Given 3/10/25  - Flu Shot: Done 24  - RSV: Will offer during season (-) @ [id number]-[id number]   - COVID: Vaccinated; had COVID infection in pregnancy  - Rhogam: N/A  - Delivery: Scheduled for RLTCS w/ Dr. Ragland 25  - Contraception: POPs (use previously) -> Nuvaring after breastfeeding  - Infant feeding: Breast; Pump recieved  - Pediatrician: Mati epperson  - RTO in 1 week        Madina Jacques MD  2025  2:48 PM

## 2025-04-29 DIAGNOSIS — O99.613 GASTROESOPHAGEAL REFLUX DURING PREGNANCY IN THIRD TRIMESTER, ANTEPARTUM: ICD-10-CM

## 2025-04-29 DIAGNOSIS — K21.9 GASTROESOPHAGEAL REFLUX DURING PREGNANCY IN THIRD TRIMESTER, ANTEPARTUM: ICD-10-CM

## 2025-04-29 DIAGNOSIS — Z3A.32 32 WEEKS GESTATION OF PREGNANCY: ICD-10-CM

## 2025-04-29 RX ORDER — FAMOTIDINE 20 MG/1
20 TABLET, FILM COATED ORAL 2 TIMES DAILY
Qty: 180 TABLET | Refills: 1 | Status: SHIPPED | OUTPATIENT
Start: 2025-04-29

## 2025-05-08 ENCOUNTER — ROUTINE PRENATAL (OUTPATIENT)
Dept: PERINATAL CARE | Facility: CLINIC | Age: 35
End: 2025-05-08
Payer: COMMERCIAL

## 2025-05-08 VITALS
WEIGHT: 195.8 LBS | DIASTOLIC BLOOD PRESSURE: 60 MMHG | OXYGEN SATURATION: 98 % | HEART RATE: 81 BPM | HEIGHT: 63 IN | BODY MASS INDEX: 34.69 KG/M2 | SYSTOLIC BLOOD PRESSURE: 104 MMHG

## 2025-05-08 DIAGNOSIS — O40.3XX0 POLYHYDRAMNIOS AFFECTING PREGNANCY IN THIRD TRIMESTER: Primary | ICD-10-CM

## 2025-05-08 DIAGNOSIS — Z3A.37 37 WEEKS GESTATION OF PREGNANCY: ICD-10-CM

## 2025-05-08 PROCEDURE — 99213 OFFICE O/P EST LOW 20 MIN: CPT

## 2025-05-08 PROCEDURE — 76819 FETAL BIOPHYS PROFIL W/O NST: CPT | Performed by: OBSTETRICS & GYNECOLOGY

## 2025-05-08 NOTE — LETTER
"   Date: 2025    Madina Jacques MD  [address] [facility] [address]    Patient: Sara Cruz   YOB: 1990   Date of Visit: 2025   Gestational age 37w1d   Nature of this communication: Routine, though please note severe polyhydramnios. If this finding is persistent at time of her  I would alert neonatology providers to this finding so they can evaluate for any potential underlying structural abnormalities. Lab workup ordered to evaluate for congenital infection and isoimmunization today.        This patient was seen recently in our  office.  Please see ultrasound report under \"OB Procedures\" tab.  Please don't hesitate to contact our office with any concerns or questions.      Sincerely,      Wilda Moran MD  Attending Physician, Maternal-Fetal Medicine  [facility]      "

## 2025-05-08 NOTE — PROGRESS NOTES
"[facility]: Sara Cruz was seen today at 37w0d for  Amniotic Fluid Index .  See ultrasound report under \"OB Procedures\" tab.  Please don't hesitate to contact our office with any concerns or questions.  -Wilda Moran MD    "

## 2025-05-09 ENCOUNTER — APPOINTMENT (OUTPATIENT)
Dept: LAB | Age: 35
End: 2025-05-09
Payer: COMMERCIAL

## 2025-05-09 ENCOUNTER — PATIENT MESSAGE (OUTPATIENT)
Dept: OBGYN CLINIC | Facility: CLINIC | Age: 35
End: 2025-05-09

## 2025-05-09 ENCOUNTER — ROUTINE PRENATAL (OUTPATIENT)
Age: 35
End: 2025-05-09

## 2025-05-09 VITALS
DIASTOLIC BLOOD PRESSURE: 68 MMHG | HEIGHT: 63 IN | WEIGHT: 197 LBS | BODY MASS INDEX: 34.91 KG/M2 | SYSTOLIC BLOOD PRESSURE: 110 MMHG

## 2025-05-09 DIAGNOSIS — O40.3XX0 POLYHYDRAMNIOS AFFECTING PREGNANCY IN THIRD TRIMESTER: ICD-10-CM

## 2025-05-09 DIAGNOSIS — Z87.59 HISTORY OF POSTPARTUM DEPRESSION: ICD-10-CM

## 2025-05-09 DIAGNOSIS — Z3A.36 36 WEEKS GESTATION OF PREGNANCY: ICD-10-CM

## 2025-05-09 DIAGNOSIS — Z86.59 HISTORY OF POSTPARTUM DEPRESSION: ICD-10-CM

## 2025-05-09 DIAGNOSIS — Z3A.37 37 WEEKS GESTATION OF PREGNANCY: ICD-10-CM

## 2025-05-09 DIAGNOSIS — B00.9 HSV (HERPES SIMPLEX VIRUS) INFECTION: ICD-10-CM

## 2025-05-09 DIAGNOSIS — O34.219 PREVIOUS CESAREAN DELIVERY, ANTEPARTUM: Primary | ICD-10-CM

## 2025-05-09 LAB
BLD GP AB SCN SERPL QL: NEGATIVE
CMV IGG SERPL QL IA: NEGATIVE
CMV IGM SERPL QL IA: NEGATIVE

## 2025-05-09 PROCEDURE — 86644 CMV ANTIBODY: CPT

## 2025-05-09 PROCEDURE — 86747 PARVOVIRUS ANTIBODY: CPT

## 2025-05-09 PROCEDURE — PNV: Performed by: STUDENT IN AN ORGANIZED HEALTH CARE EDUCATION/TRAINING PROGRAM

## 2025-05-09 PROCEDURE — 36415 COLL VENOUS BLD VENIPUNCTURE: CPT

## 2025-05-09 PROCEDURE — 86645 CMV ANTIBODY IGM: CPT

## 2025-05-09 PROCEDURE — 86850 RBC ANTIBODY SCREEN: CPT

## 2025-05-09 NOTE — PROGRESS NOTES
OB/GYN  PN Visit  Sara Cruz  [id number]  2025  9:37 AM  Madina Jacques MD    S: 35 y.o.  37w1d here for PN visit- she denies any contractions, loss of fluid or vaginal bleeding.  She is tearful today given diagnosis of severe polyhydramnios-questions surrounding this were reviewed today.    Pregnancy is otherwise complicated by history of  delivery.    O:  Vitals:    25 0900   BP: 110/68       A/P:  #1. 37w1d GESTATION  - Continue PNV  - Labor precautions reviewed  - Fetal kick counts reviewed  - Labs: UTD  - Genetics: NIPT neg; MSAFP WNL  - Ultrasounds: Level II US wnl on 25; growth on 2025 showed EFW 79% with AC 98% & mild polyhydramnios- report from yesterday is not available for review.  - Tdap: Given 3/10/25  - Flu Shot: Done 24  - RSV: Will offer during season (-) @ 59m6v-54m7r   - COVID: Vaccinated; had COVID infection in pregnancy  - Rhogam: N/A  - Delivery: Scheduled for RLTCS w/ Dr. Ragland 25  - Contraception: POPs (use previously) -> Nuvaring after breastfeeding  - Infant feeding: Breast; Pump recieved  - Pediatrician: Mati epperson  - GBS: Collected today and reviewed, PCN allergy  - RTO in 1 week    #2. Polyhydramnios (severe)  -  report not available for review today- patient reports GILMAR 36 cm  - completed vial testing today  - Reviewed [facility]  - Reviewed risk of PPH  - Plan for ANFS and repeat growth and GILMAR Tuesday    Madina Jacques MD  2025  9:37 AM

## 2025-05-10 LAB
B19V IGG SER IA-ACNC: 2.5 INDEX (ref 0–0.8)
B19V IGM SER IA-ACNC: 0.1 INDEX (ref 0–0.8)

## 2025-05-11 ENCOUNTER — RESULTS FOLLOW-UP (OUTPATIENT)
Age: 35
End: 2025-05-11

## 2025-05-12 ENCOUNTER — NURSE TRIAGE (OUTPATIENT)
Age: 35
End: 2025-05-12

## 2025-05-12 ENCOUNTER — TELEPHONE (OUTPATIENT)
Age: 35
End: 2025-05-12

## 2025-05-12 ENCOUNTER — TELEPHONE (OUTPATIENT)
Dept: PERINATAL CARE | Facility: CLINIC | Age: 35
End: 2025-05-12

## 2025-05-12 NOTE — TELEPHONE ENCOUNTER
"FOLLOW UP: home care  Advised patient to increased fluids and fiber in diet, may take colace for constipation. Reviewed using tucks wipes, preparation H, sitz bath for discomfort. Advised patient to return call if symptoms change/worsen or do not improve with home care advice.    REASON FOR CONVERSATION: Hemorrhoids    SYMPTOMS: swelling and tenderness around anal area    OTHER: Patient 37w4d  with swelling and discomfort in anal area since Friday which has been increasing. Patient reports having constipation over the weekend. Denies any rectal bleeding, vaginal bleeding, leaking of fluid, abdominal pain/contractions. Has been feeling normal fetal movement.     DISPOSITION: Home Care      Reason for Disposition   Mild rectal pain    Answer Assessment - Initial Assessment Questions  1. SYMPTOM:  \"What's the main symptom you're concerned about?\" (e.g., pain, itching, swelling, rash)      Swollen lump in anal area, tenderness  2. ONSET: \"When did the symptoms  start?\"      5/9 started feeling tenderness, yesterday felt the lump  3. RECTAL PAIN: \"Do you have any pain around your rectum?\" \"How bad is the pain?\"  (Scale 1-10; or mild, moderate, severe)      7/10 with wiping or touching, 3/10 when not touching  4. RECTAL ITCHING: \"Do you have any itching in this area?\" \"How bad is the itching?\"  (Scale 1-10; or mild, moderate, severe)      Denies   5. CONSTIPATION: \"Do you have constipation?\" If Yes, ask: \"How often do you have a bowel movement (BM)?\"  (Normal range: 3 times a day to every 3 days)  \"When was your last BM?\"        Yesterday last BM, did have some constipation over the weekend  6. CAUSE: \"What do you think is causing the anus symptoms?\"      Possible hemorrhoid  7. OTHER SYMPTOMS: \"Do you have any other symptoms?\"  (e.g., abdomen pain, vaginal bleeding, fever)      denies  8. PREGNANCY: \"How many weeks pregnant are you?\"       37w4d  9. ANNA: \"What date are you expecting to deliver?\"      " 5/29/25    Protocols used: Pregnancy - Rectal Symptoms-Adult-AH

## 2025-05-12 NOTE — TELEPHONE ENCOUNTER
Pt calling to schedule NST/GILMAR from OB provider before  . Called [facility] and spoke with Laura regarding adding NST/GILMAR to 5/15 visit. Pt agreeable to visit.  Pt made aware of message sent.

## 2025-05-12 NOTE — TELEPHONE ENCOUNTER
call and spoke with patient about getting an nst/anne for the week of . patient has scheduled  on . offered  at 845 in [place] and pt refused due to distance and time. advised pt to call ob and arrange LD nst/anne. Also advised pt to call mfm back if OB cannot accommodate.

## 2025-05-13 ENCOUNTER — ULTRASOUND (OUTPATIENT)
Facility: HOSPITAL | Age: 35
End: 2025-05-13
Payer: COMMERCIAL

## 2025-05-13 VITALS
HEIGHT: 63 IN | SYSTOLIC BLOOD PRESSURE: 110 MMHG | HEART RATE: 80 BPM | BODY MASS INDEX: 34.45 KG/M2 | DIASTOLIC BLOOD PRESSURE: 64 MMHG | WEIGHT: 194.45 LBS | OXYGEN SATURATION: 98 %

## 2025-05-13 DIAGNOSIS — Z3A.37 37 WEEKS GESTATION OF PREGNANCY: Primary | ICD-10-CM

## 2025-05-13 DIAGNOSIS — O99.210 OBESITY AFFECTING PREGNANCY, ANTEPARTUM, UNSPECIFIED OBESITY TYPE: ICD-10-CM

## 2025-05-13 DIAGNOSIS — Z36.89 ENCOUNTER FOR ULTRASOUND TO ASSESS FETAL GROWTH: ICD-10-CM

## 2025-05-13 DIAGNOSIS — O40.3XX0 POLYHYDRAMNIOS AFFECTING PREGNANCY IN THIRD TRIMESTER: ICD-10-CM

## 2025-05-13 DIAGNOSIS — O34.219 PREVIOUS CESAREAN DELIVERY, ANTEPARTUM: ICD-10-CM

## 2025-05-13 PROCEDURE — 76816 OB US FOLLOW-UP PER FETUS: CPT | Performed by: OBSTETRICS & GYNECOLOGY

## 2025-05-13 PROCEDURE — 76819 FETAL BIOPHYS PROFIL W/O NST: CPT | Performed by: OBSTETRICS & GYNECOLOGY

## 2025-05-13 RX ORDER — DOCUSATE SODIUM 100 MG/1
100 CAPSULE, LIQUID FILLED ORAL DAILY
COMMUNITY

## 2025-05-13 NOTE — PROGRESS NOTES
This patient received  care under my supervision at [facility].  The note is contained in the ultrasound report located under OB Procedures tab in Epic.  Please call our office at [phone] with questions.  -Ita Toledo MD

## 2025-05-14 ENCOUNTER — RESULTS FOLLOW-UP (OUTPATIENT)
Dept: LABOR AND DELIVERY | Facility: HOSPITAL | Age: 35
End: 2025-05-14

## 2025-05-15 ENCOUNTER — ROUTINE PRENATAL (OUTPATIENT)
Dept: OBGYN CLINIC | Facility: CLINIC | Age: 35
End: 2025-05-15

## 2025-05-15 VITALS
OXYGEN SATURATION: 97 % | SYSTOLIC BLOOD PRESSURE: 102 MMHG | WEIGHT: 195.2 LBS | HEART RATE: 94 BPM | DIASTOLIC BLOOD PRESSURE: 60 MMHG | BODY MASS INDEX: 34.58 KG/M2

## 2025-05-15 DIAGNOSIS — Z34.83 PRENATAL CARE, SUBSEQUENT PREGNANCY IN THIRD TRIMESTER: Primary | ICD-10-CM

## 2025-05-15 PROCEDURE — PNV: Performed by: STUDENT IN AN ORGANIZED HEALTH CARE EDUCATION/TRAINING PROGRAM

## 2025-05-15 NOTE — ASSESSMENT & PLAN NOTE
Sara presents today for her 38 week visit. She is currently 38w0d.      Vitals:    05/15/25 0700   BP: 102/60   Pulse: 94   SpO2: 97%         I have reviewed the signs and symptoms of labor with the patient, including contractions q 4-5 minutes for greater than 1 hour, vaginal bleeding, leaking fluid and decreased fetal movement. I have emphasized the continued importance of paying attention to the baby's movements. I have instructed the patient to call the office with any of the above symptoms prior to coming to the hospital.      Signs of pre-eclampsia were also reviewed with the patient: headache, visual changes, sudden increased edema and/or severe right upper quadrant pain.      + car seat

## 2025-05-15 NOTE — PROGRESS NOTES
Assessment/Plan  1. Prenatal care, subsequent pregnancy in third trimester  Overview:  - Continue PNV  - Labor precautions reviewed  - Fetal kick counts reviewed  - Labs: up to date   - Genetics: NIPT neg; MSAFP WNL  - Ultrasounds: Level II US wnl on 25; growth on 2025 showed EFW 79% with AC 98%, polyhydramnios   Tdap: Given 3/10/25  - Flu Shot: Done 24  - RSV: Will offer during season (-) @ [id number]   - COVID: Vaccinated; had COVID infection in pregnancy  - Rhogam: N/A  - Delivery: Scheduled for RLTCS w/ Dr. Ragland 25  - Contraception: POPs (use previously) -> Nuvaring after breastfeeding  - Infant feeding: Breast; Pump recieved  - Pediatrician: Mati epperson  - GBS: neg  - RTO in 1 week  Assessment & Plan:    Sara presents today for her 38 week visit. She is currently 38w0d.      Vitals:    05/15/25 0700   BP: 102/60   Pulse: 94   SpO2: 97%         I have reviewed the signs and symptoms of labor with the patient, including contractions q 4-5 minutes for greater than 1 hour, vaginal bleeding, leaking fluid and decreased fetal movement. I have emphasized the continued importance of paying attention to the baby's movements. I have instructed the patient to call the office with any of the above symptoms prior to coming to the hospital.      Signs of pre-eclampsia were also reviewed with the patient: headache, visual changes, sudden increased edema and/or severe right upper quadrant pain.      + car seat          Subjective    Sara is a 35 y.o. female,  with an Estimated Date of Delivery: 25 with a current gestational age of 38w0d. Patient reports no complaints. Fetal movement: active.     History  The following portions of the patient's history were reviewed and updated as appropriate: allergies, current medications, past family history, past medical history, past social history, past surgical history and problem list.        Objective  Vitals:    05/15/25 0700   BP: 102/60    Pulse: 94   SpO2: 97%     FHT: 130   FH: 39

## 2025-05-19 ENCOUNTER — HOSPITAL ENCOUNTER (INPATIENT)
Facility: HOSPITAL | Age: 35
LOS: 2 days | Discharge: HOME | End: 2025-05-21
Attending: STUDENT IN AN ORGANIZED HEALTH CARE EDUCATION/TRAINING PROGRAM | Admitting: STUDENT IN AN ORGANIZED HEALTH CARE EDUCATION/TRAINING PROGRAM
Payer: COMMERCIAL

## 2025-05-19 ENCOUNTER — ANESTHESIA (INPATIENT)
Dept: LABOR AND DELIVERY | Facility: HOSPITAL | Age: 35
End: 2025-05-19
Payer: COMMERCIAL

## 2025-05-19 ENCOUNTER — NURSE TRIAGE (OUTPATIENT)
Dept: OTHER | Facility: OTHER | Age: 35
End: 2025-05-19

## 2025-05-19 ENCOUNTER — ANESTHESIA EVENT (INPATIENT)
Dept: LABOR AND DELIVERY | Facility: HOSPITAL | Age: 35
End: 2025-05-19
Payer: COMMERCIAL

## 2025-05-19 DIAGNOSIS — Z98.891 HISTORY OF C-SECTION: ICD-10-CM

## 2025-05-19 DIAGNOSIS — Z98.891 STATUS POST REPEAT LOW TRANSVERSE CESAREAN SECTION: Primary | ICD-10-CM

## 2025-05-19 PROBLEM — O42.90 PREMATURE RUPTURE OF MEMBRANES: Status: ACTIVE | Noted: 2025-05-19

## 2025-05-19 LAB
ABO GROUP BLD: NORMAL
BASE EXCESS BLDCOA CALC-SCNC: -3.1 MMOL/L (ref 3–11)
BASE EXCESS BLDCOV CALC-SCNC: -3.2 MMOL/L (ref 1–9)
BASOPHILS # BLD MANUAL: 0.09 THOUSAND/UL (ref 0–0.1)
BASOPHILS NFR MAR MANUAL: 1 % (ref 0–1)
BLD GP AB SCN SERPL QL: NEGATIVE
EOSINOPHIL # BLD MANUAL: 0 THOUSAND/UL (ref 0–0.4)
EOSINOPHIL NFR BLD MANUAL: 0 % (ref 0–6)
ERYTHROCYTE [DISTWIDTH] IN BLOOD BY AUTOMATED COUNT: 13.3 % (ref 11.6–15.1)
EXTERNAL GROUP B STREP ANTIGEN: NEGATIVE
HCO3 BLDCOA-SCNC: 25.2 MMOL/L (ref 17.3–27.3)
HCO3 BLDCOV-SCNC: 21.8 MMOL/L (ref 12.2–28.6)
HCT VFR BLD AUTO: 37.4 % (ref 34.8–46.1)
HGB BLD-MCNC: 12.3 G/DL (ref 11.5–15.4)
HOLD SPECIMEN: NORMAL
LG PLATELETS BLD QL SMEAR: PRESENT
LYMPHOCYTES # BLD AUTO: 1.79 THOUSAND/UL (ref 0.6–4.47)
LYMPHOCYTES # BLD AUTO: 19 % (ref 14–44)
MCH RBC QN AUTO: 29.6 PG (ref 26.8–34.3)
MCHC RBC AUTO-ENTMCNC: 32.9 G/DL (ref 31.4–37.4)
MCV RBC AUTO: 90 FL (ref 82–98)
MONOCYTES # BLD AUTO: 0.38 THOUSAND/UL (ref 0–1.22)
MONOCYTES NFR BLD: 4 % (ref 4–12)
NEUTROPHILS # BLD MANUAL: 7.14 THOUSAND/UL (ref 1.85–7.62)
NEUTS SEG NFR BLD AUTO: 76 % (ref 43–75)
O2 CT VFR BLDCOA CALC: 7.3 ML/DL
OXYHGB MFR BLDCOA: 31.7 %
OXYHGB MFR BLDCOV: 67.1 %
PCO2 BLDCOA: 57.7 MM[HG] (ref 30–60)
PCO2 BLDCOV: 39.1 MM HG (ref 27–43)
PH BLDCOA: 7.26 [PH] (ref 7.23–7.43)
PH BLDCOV: 7.36 [PH] (ref 7.19–7.49)
PLATELET # BLD AUTO: 155 THOUSANDS/UL (ref 149–390)
PLATELET BLD QL SMEAR: ADEQUATE
PMV BLD AUTO: 13.1 FL (ref 8.9–12.7)
PO2 BLDCOA: 16.3 MM HG (ref 5–25)
PO2 BLDCOV: 26.5 MM HG (ref 15–45)
RBC # BLD AUTO: 4.15 MILLION/UL (ref 3.81–5.12)
RBC MORPH BLD: NORMAL
RH BLD: POSITIVE
SAO2 % BLDCOV: 14.9 ML/DL
SPECIMEN EXPIRATION DATE: NORMAL
TREPONEMA PALLIDUM IGG+IGM AB [PRESENCE] IN SERUM OR PLASMA BY IMMUNOASSAY: NORMAL
WBC # BLD AUTO: 9.4 THOUSAND/UL (ref 4.31–10.16)

## 2025-05-19 PROCEDURE — NC001 PR NO CHARGE: Performed by: STUDENT IN AN ORGANIZED HEALTH CARE EDUCATION/TRAINING PROGRAM

## 2025-05-19 PROCEDURE — 94760 N-INVAS EAR/PLS OXIMETRY 1: CPT

## 2025-05-19 PROCEDURE — 4A1HXCZ MONITORING OF PRODUCTS OF CONCEPTION, CARDIAC RATE, EXTERNAL APPROACH: ICD-10-PCS | Performed by: STUDENT IN AN ORGANIZED HEALTH CARE EDUCATION/TRAINING PROGRAM

## 2025-05-19 PROCEDURE — 85027 COMPLETE CBC AUTOMATED: CPT

## 2025-05-19 PROCEDURE — 86901 BLOOD TYPING SEROLOGIC RH(D): CPT

## 2025-05-19 PROCEDURE — 59510 CESAREAN DELIVERY: CPT | Performed by: STUDENT IN AN ORGANIZED HEALTH CARE EDUCATION/TRAINING PROGRAM

## 2025-05-19 PROCEDURE — 86850 RBC ANTIBODY SCREEN: CPT

## 2025-05-19 PROCEDURE — 86900 BLOOD TYPING SEROLOGIC ABO: CPT

## 2025-05-19 PROCEDURE — 99213 OFFICE O/P EST LOW 20 MIN: CPT

## 2025-05-19 PROCEDURE — 94762 N-INVAS EAR/PLS OXIMTRY CONT: CPT

## 2025-05-19 PROCEDURE — 3E0R3BZ INTRODUCTION OF ANESTHETIC AGENT INTO SPINAL CANAL, PERCUTANEOUS APPROACH: ICD-10-PCS | Performed by: STUDENT IN AN ORGANIZED HEALTH CARE EDUCATION/TRAINING PROGRAM

## 2025-05-19 PROCEDURE — NC001 PR NO CHARGE: Performed by: PHYSICIAN ASSISTANT

## 2025-05-19 PROCEDURE — 86780 TREPONEMA PALLIDUM: CPT

## 2025-05-19 PROCEDURE — 82805 BLOOD GASES W/O2 SATURATION: CPT | Performed by: STUDENT IN AN ORGANIZED HEALTH CARE EDUCATION/TRAINING PROGRAM

## 2025-05-19 PROCEDURE — 85007 BL SMEAR W/DIFF WBC COUNT: CPT

## 2025-05-19 RX ORDER — METOCLOPRAMIDE HYDROCHLORIDE 5 MG/ML
10 INJECTION INTRAMUSCULAR; INTRAVENOUS ONCE AS NEEDED
Status: DISCONTINUED | OUTPATIENT
Start: 2025-05-19 | End: 2025-05-21 | Stop reason: HOSPADM

## 2025-05-19 RX ORDER — ENOXAPARIN SODIUM 100 MG/ML
40 INJECTION SUBCUTANEOUS
Status: DISCONTINUED | OUTPATIENT
Start: 2025-05-20 | End: 2025-05-21 | Stop reason: HOSPADM

## 2025-05-19 RX ORDER — DEXAMETHASONE SODIUM PHOSPHATE 10 MG/ML
INJECTION, SOLUTION INTRAMUSCULAR; INTRAVENOUS AS NEEDED
Status: DISCONTINUED | OUTPATIENT
Start: 2025-05-19 | End: 2025-05-19

## 2025-05-19 RX ORDER — OXYCODONE HYDROCHLORIDE 5 MG/1
5 TABLET ORAL EVERY 4 HOURS PRN
Status: DISCONTINUED | OUTPATIENT
Start: 2025-05-20 | End: 2025-05-19

## 2025-05-19 RX ORDER — ACETAMINOPHEN 325 MG/1
650 TABLET ORAL EVERY 6 HOURS SCHEDULED
Status: DISCONTINUED | OUTPATIENT
Start: 2025-05-19 | End: 2025-05-21 | Stop reason: HOSPADM

## 2025-05-19 RX ORDER — DOCUSATE SODIUM 100 MG/1
100 CAPSULE, LIQUID FILLED ORAL 2 TIMES DAILY
Status: DISCONTINUED | OUTPATIENT
Start: 2025-05-19 | End: 2025-05-21 | Stop reason: HOSPADM

## 2025-05-19 RX ORDER — SODIUM CHLORIDE, SODIUM LACTATE, POTASSIUM CHLORIDE, CALCIUM CHLORIDE 600; 310; 30; 20 MG/100ML; MG/100ML; MG/100ML; MG/100ML
125 INJECTION, SOLUTION INTRAVENOUS CONTINUOUS
Status: DISCONTINUED | OUTPATIENT
Start: 2025-05-19 | End: 2025-05-21 | Stop reason: HOSPADM

## 2025-05-19 RX ORDER — BUPROPION HYDROCHLORIDE 150 MG/1
150 TABLET ORAL DAILY
Status: DISCONTINUED | OUTPATIENT
Start: 2025-05-19 | End: 2025-05-21 | Stop reason: HOSPADM

## 2025-05-19 RX ORDER — ONDANSETRON 2 MG/ML
4 INJECTION INTRAMUSCULAR; INTRAVENOUS EVERY 6 HOURS PRN
Status: DISCONTINUED | OUTPATIENT
Start: 2025-05-19 | End: 2025-05-21 | Stop reason: HOSPADM

## 2025-05-19 RX ORDER — OXYTOCIN/0.9 % SODIUM CHLORIDE 30/500 ML
62.5 PLASTIC BAG, INJECTION (ML) INTRAVENOUS CONTINUOUS
Status: ACTIVE | OUTPATIENT
Start: 2025-05-19 | End: 2025-05-19

## 2025-05-19 RX ORDER — KETOROLAC TROMETHAMINE 30 MG/ML
INJECTION, SOLUTION INTRAMUSCULAR; INTRAVENOUS AS NEEDED
Status: DISCONTINUED | OUTPATIENT
Start: 2025-05-19 | End: 2025-05-19

## 2025-05-19 RX ORDER — OXYCODONE HYDROCHLORIDE 10 MG/1
10 TABLET ORAL EVERY 4 HOURS PRN
Status: DISCONTINUED | OUTPATIENT
Start: 2025-05-20 | End: 2025-05-19

## 2025-05-19 RX ORDER — OXYTOCIN/RINGER'S LACTATE 30/500 ML
PLASTIC BAG, INJECTION (ML) INTRAVENOUS CONTINUOUS PRN
Status: DISCONTINUED | OUTPATIENT
Start: 2025-05-19 | End: 2025-05-19

## 2025-05-19 RX ORDER — KETOROLAC TROMETHAMINE 30 MG/ML
15 INJECTION, SOLUTION INTRAMUSCULAR; INTRAVENOUS EVERY 6 HOURS SCHEDULED
Status: DISCONTINUED | OUTPATIENT
Start: 2025-05-19 | End: 2025-05-20

## 2025-05-19 RX ORDER — MORPHINE SULFATE 0.5 MG/ML
INJECTION, SOLUTION EPIDURAL; INTRATHECAL; INTRAVENOUS AS NEEDED
Status: DISCONTINUED | OUTPATIENT
Start: 2025-05-19 | End: 2025-05-19

## 2025-05-19 RX ORDER — HYDROMORPHONE HCL/PF 1 MG/ML
0.5 SYRINGE (ML) INJECTION EVERY 2 HOUR PRN
Refills: 0 | Status: DISCONTINUED | OUTPATIENT
Start: 2025-05-19 | End: 2025-05-21 | Stop reason: HOSPADM

## 2025-05-19 RX ORDER — BUPIVACAINE HYDROCHLORIDE 7.5 MG/ML
INJECTION, SOLUTION INTRASPINAL AS NEEDED
Status: DISCONTINUED | OUTPATIENT
Start: 2025-05-19 | End: 2025-05-19

## 2025-05-19 RX ORDER — ONDANSETRON 2 MG/ML
4 INJECTION INTRAMUSCULAR; INTRAVENOUS ONCE AS NEEDED
Status: DISCONTINUED | OUTPATIENT
Start: 2025-05-19 | End: 2025-05-21 | Stop reason: HOSPADM

## 2025-05-19 RX ORDER — HYDROMORPHONE HCL/PF 1 MG/ML
0.5 SYRINGE (ML) INJECTION
Status: DISCONTINUED | OUTPATIENT
Start: 2025-05-19 | End: 2025-05-20

## 2025-05-19 RX ORDER — FENTANYL CITRATE/PF 50 MCG/ML
25 SYRINGE (ML) INJECTION
Status: DISCONTINUED | OUTPATIENT
Start: 2025-05-19 | End: 2025-05-20

## 2025-05-19 RX ORDER — CALCIUM CARBONATE 500 MG/1
1000 TABLET, CHEWABLE ORAL 3 TIMES DAILY PRN
Status: DISCONTINUED | OUTPATIENT
Start: 2025-05-19 | End: 2025-05-21 | Stop reason: HOSPADM

## 2025-05-19 RX ORDER — CLINDAMYCIN PHOSPHATE 900 MG/50ML
900 INJECTION, SOLUTION INTRAVENOUS ONCE
Status: COMPLETED | OUTPATIENT
Start: 2025-05-19 | End: 2025-05-19

## 2025-05-19 RX ORDER — FLUOXETINE 10 MG/1
40 CAPSULE ORAL DAILY
Status: DISCONTINUED | OUTPATIENT
Start: 2025-05-19 | End: 2025-05-21 | Stop reason: HOSPADM

## 2025-05-19 RX ORDER — OXYCODONE HYDROCHLORIDE 5 MG/1
5 TABLET ORAL EVERY 4 HOURS PRN
Refills: 0 | Status: DISCONTINUED | OUTPATIENT
Start: 2025-05-20 | End: 2025-05-21 | Stop reason: HOSPADM

## 2025-05-19 RX ORDER — DIPHENHYDRAMINE HYDROCHLORIDE 50 MG/ML
25 INJECTION, SOLUTION INTRAMUSCULAR; INTRAVENOUS EVERY 6 HOURS PRN
Status: DISCONTINUED | OUTPATIENT
Start: 2025-05-19 | End: 2025-05-21 | Stop reason: HOSPADM

## 2025-05-19 RX ORDER — OXYCODONE HYDROCHLORIDE 10 MG/1
10 TABLET ORAL EVERY 4 HOURS PRN
Refills: 0 | Status: DISCONTINUED | OUTPATIENT
Start: 2025-05-20 | End: 2025-05-21 | Stop reason: HOSPADM

## 2025-05-19 RX ORDER — NALOXONE HYDROCHLORIDE 0.4 MG/ML
0.1 INJECTION, SOLUTION INTRAMUSCULAR; INTRAVENOUS; SUBCUTANEOUS
Status: ACTIVE | OUTPATIENT
Start: 2025-05-19 | End: 2025-05-20

## 2025-05-19 RX ORDER — ONDANSETRON 2 MG/ML
INJECTION INTRAMUSCULAR; INTRAVENOUS AS NEEDED
Status: DISCONTINUED | OUTPATIENT
Start: 2025-05-19 | End: 2025-05-19

## 2025-05-19 RX ORDER — FENTANYL CITRATE 50 UG/ML
INJECTION, SOLUTION INTRAMUSCULAR; INTRAVENOUS AS NEEDED
Status: DISCONTINUED | OUTPATIENT
Start: 2025-05-19 | End: 2025-05-19

## 2025-05-19 RX ORDER — NALBUPHINE HYDROCHLORIDE 10 MG/ML
5 INJECTION INTRAMUSCULAR; INTRAVENOUS; SUBCUTANEOUS
Status: DISPENSED | OUTPATIENT
Start: 2025-05-19 | End: 2025-05-20

## 2025-05-19 RX ORDER — IBUPROFEN 600 MG/1
600 TABLET, FILM COATED ORAL EVERY 6 HOURS
Status: DISCONTINUED | OUTPATIENT
Start: 2025-05-20 | End: 2025-05-20

## 2025-05-19 RX ADMIN — GENTAMICIN SULFATE 334 MG: 40 INJECTION, SOLUTION INTRAMUSCULAR; INTRAVENOUS at 09:53

## 2025-05-19 RX ADMIN — CLINDAMYCIN PHOSPHATE 900 MG: 900 INJECTION, SOLUTION INTRAVENOUS at 09:44

## 2025-05-19 RX ADMIN — ONDANSETRON 4 MG: 2 INJECTION INTRAMUSCULAR; INTRAVENOUS at 10:15

## 2025-05-19 RX ADMIN — MORPHINE SULFATE 0.15 MG: 0.5 INJECTION EPIDURAL; INTRATHECAL; INTRAVENOUS at 09:53

## 2025-05-19 RX ADMIN — DEXAMETHASONE SODIUM PHOSPHATE 10 MG: 10 INJECTION INTRAMUSCULAR; INTRAVENOUS at 10:15

## 2025-05-19 RX ADMIN — Medication 500 MG: at 10:04

## 2025-05-19 RX ADMIN — SODIUM CHLORIDE, SODIUM LACTATE, POTASSIUM CHLORIDE, AND CALCIUM CHLORIDE: .6; .31; .03; .02 INJECTION, SOLUTION INTRAVENOUS at 10:44

## 2025-05-19 RX ADMIN — Medication 62.5 MILLI-UNITS/MIN: at 12:20

## 2025-05-19 RX ADMIN — KETOROLAC TROMETHAMINE 30 MG: 30 INJECTION, SOLUTION INTRAMUSCULAR; INTRAVENOUS at 10:55

## 2025-05-19 RX ADMIN — BUPIVACAINE HYDROCHLORIDE IN DEXTROSE 1.5 ML: 7.5 INJECTION, SOLUTION SUBARACHNOID at 09:53

## 2025-05-19 RX ADMIN — SODIUM CHLORIDE, SODIUM LACTATE, POTASSIUM CHLORIDE, AND CALCIUM CHLORIDE 125 ML/HR: .6; .31; .03; .02 INJECTION, SOLUTION INTRAVENOUS at 12:24

## 2025-05-19 RX ADMIN — FENTANYL CITRATE 15 MCG: 50 INJECTION INTRAMUSCULAR; INTRAVENOUS at 09:53

## 2025-05-19 RX ADMIN — ACETAMINOPHEN 650 MG: 325 TABLET ORAL at 20:28

## 2025-05-19 RX ADMIN — SODIUM CHLORIDE, SODIUM LACTATE, POTASSIUM CHLORIDE, AND CALCIUM CHLORIDE: .6; .31; .03; .02 INJECTION, SOLUTION INTRAVENOUS at 08:52

## 2025-05-19 RX ADMIN — SODIUM CHLORIDE, SODIUM LACTATE, POTASSIUM CHLORIDE, AND CALCIUM CHLORIDE 1000 ML: .6; .31; .03; .02 INJECTION, SOLUTION INTRAVENOUS at 08:30

## 2025-05-19 RX ADMIN — PHENYLEPHRINE HYDROCHLORIDE 50 MCG/MIN: 50 INJECTION INTRAVENOUS at 09:54

## 2025-05-19 RX ADMIN — KETOROLAC TROMETHAMINE 15 MG: 30 INJECTION, SOLUTION INTRAMUSCULAR; INTRAVENOUS at 18:07

## 2025-05-19 RX ADMIN — Medication 250 MILLI-UNITS/MIN: at 10:11

## 2025-05-19 RX ADMIN — DOCUSATE SODIUM 100 MG: 100 CAPSULE, LIQUID FILLED ORAL at 18:11

## 2025-05-19 RX ADMIN — NALBUPHINE HYDROCHLORIDE 5 MG: 10 INJECTION, SOLUTION INTRAMUSCULAR; INTRAVENOUS; SUBCUTANEOUS at 13:27

## 2025-05-19 RX ADMIN — SODIUM CHLORIDE, SODIUM LACTATE, POTASSIUM CHLORIDE, AND CALCIUM CHLORIDE 125 ML/HR: .6; .31; .03; .02 INJECTION, SOLUTION INTRAVENOUS at 19:13

## 2025-05-19 NOTE — ANESTHESIA PREPROCEDURE EVALUATION
Procedure:   SECTION () REPEAT (Uterus)    Relevant Problems   GYN   (+) Pregnant      NEURO/PSYCH   (+) Anxiety        Physical Exam    Airway     Mallampati score: III  TM Distance: >3 FB  Neck ROM: full  Mouth opening: >= 4 cm      Cardiovascular  Cardiovascular exam normal    Dental   No notable dental hx     Pulmonary  Pulmonary exam normal     Neurological  - normal exam    Other Findings  post-pubertal.      Anesthesia Plan  ASA Score- 2     Anesthesia Type- spinal with ASA Monitors.         Additional Monitors:     Airway Plan:            Plan Factors-Exercise tolerance (METS): >4 METS.    Chart reviewed.   Existing labs reviewed. Patient summary reviewed.                  Induction-     Postoperative Plan- .   Monitoring Plan - Monitoring plan - standard ASA monitoring      Perioperative Resuscitation Plan - Level 1 - Full Code.       Informed Consent- Anesthetic plan and risks discussed with patient.  I personally reviewed this patient with the CRNA. Discussed and agreed on the Anesthesia Plan with the CRNA..      NPO Status:  Vitals Value Taken Time   Date of last liquid 25 07:22   Time of last liquid 0700 25 07:22   Date of last solid 25 07:22   Time of last solid 2030 25 07:22

## 2025-05-19 NOTE — ANESTHESIA PROCEDURE NOTES
Spinal Block    Patient location during procedure: OR  Start time: 5/19/2025 9:53 AM  Reason for block: procedure for pain and at surgeon's request  Staffing  Performed by: Caitlin Miller  Authorized by: Maxime Hutson MD    Preanesthetic Checklist  Completed: patient identified, IV checked, risks and benefits discussed, surgical consent, monitors and equipment checked, pre-op evaluation and timeout performed  Spinal Block  Patient position: sitting  Prep: ChloraPrep and site prepped and draped  Patient monitoring: frequent blood pressure checks, continuous pulse ox and heart rate  Approach: midline  Location: L3-4  Needle  Needle type: Pencan   Needle gauge: 24 G  Needle length: 4 in  Assessment  Sensory level: T4  Injection Assessment:  negative aspiration for heme, no paresthesia on injection and positive aspiration for clear CSF.  Post-procedure:  site cleaned

## 2025-05-19 NOTE — DISCHARGE SUMMARY
Discharge Summary - OB/GYN   Name: Sara Cosby y.o. female I MRN: [id number]  Unit/Bed#: -01 I Date of Admission: 2025   Date of Service: 2025 I Hospital Day: 0    Obstetrics Discharge Summary  Sara Cruz 35 y.o. female MRN: [id number]  Unit/Bed#: -01 Encounter: [id number]    Admission Date: 2025     Discharge Date: 2025    Admitting Attending: Dr. Ragland  Delivery Attending: Dr. Ragland  Discharging Attending: Dr Francis    Admitting Diagnoses:   1.  Premature rupture of membranes  2.  Anxiety    Discharge Diagnoses:   Same, delivered  Delivered of a term     Delivery  Route of Delivery: , Low Transverse    Anesthesia: Spinal,   QBL: 354 ml    Delivery: , Low Transverse at 2025 10:10 AM      Baby's Weight: 3385 g (7 lb 7.4 oz); 119.4    Apgar scores: 8  and 9  at 1 and 5 minutes, respectively      Hospital course: Sara Cruz is now a 35 y.o. G 2 P  who was initially admitted at 38 w 4 d for premature rupture of membranes and irregular contractions.  Patient started feeling leakage of meconium fluid at 615 this morning.  Patient does not want a vaginal delivery.  Patient wants a repeat low-transverse .  She was admitted and received prophylactic antibiotics and she was sent to the OR.    Her post-delivery course was uncomplicated. Her postpartum pain was well controlled with oral analgesics. Maternal blood type is A positive so RhoGAM wasn't indicated.    On day of discharge, she was ambulating and able to reasonably perform all ADLs. She was voiding and had appropriate bowel function. Pain was well controlled. She was discharged home on postpartum day #2 without complications. Patient was instructed to follow up with her OBGYN as an outpatient and was given appropriate warnings to call provider if she develops signs of infection or uncontrolled pain.    Complications: none apparent    Condition at discharge: good     Provisions for  Follow-Up Care:  Please see after visit summary for information related to follow-up care and any pertinent home health orders.      Disposition: Home    Planned Readmission: No    Discharge Medications:   Please see AVS for a complete list of discharge medications.    Discharge instructions :   -Do not place anything (no partner, tampons or douche) in your vagina for 6 weeks  -You may walk for exercise for the first 6 weeks then gradually return to your usual activities   -Please do not drive for 1 week if you have no stitches and for 2 weeks if you have stitches    -You may take baths or shower per your preference   -Please examine your breasts in the mirror daily and call your doctor for redness or tenderness or increased warmth    -Please call your doctor's office if temperature > 100.4*F or 38* C, worsening pain or a foul discharge.

## 2025-05-19 NOTE — ASSESSMENT & PLAN NOTE
[id number], Hgb 11.4 --> 9.8 (Venofer)  Mai out passed Void trial  DVT ppx:SCDs + Lovenox 40 mg qD POD#1  Continue routine post partum care  Encourage ambulation  Encourage breastfeeding  Contraception: Undecided  Anticipate discharge PPD 3 vs 4

## 2025-05-19 NOTE — OP NOTE
OPERATIVE REPORT  PATIENT NAME: Sara Cruz    :  1990  MRN: [id number]  Pt Location: [facility] L&D OR ROOM 02    SURGERY DATE: 2025    Surgeons and Role:     * Zaira Ragland MD - Primary     * Carmelo Hicks Jr., MD - Assisting    Preop Diagnosis:  History of  [Z98.891]    Post-Op Diagnosis Codes:     * History of  [Z98.891]    Procedure(s) (LRB):   SECTION () REPEAT (N/A)    Specimen(s):  ID Type Source Tests Collected by Time Destination   A :  Tissue (Placenta on Hold) OB Only Placenta PLACENTA IN STORAGE Zaira Ragland MD 2025 1013    B :  Cord Blood Cord BLOOD GAS, VENOUS, CORD, BLOOD GAS, ARTERIAL, CORD Zaira aRgland MD 2025 1010        Surgical QBL:  Surgical QBL (mL): 354 mL      Drains:  Urethral Catheter Non-latex 16 Fr. (Active)   Number of days: 0       Anesthesia Type:   Spinal    Operative Indications:  History of  [Z98.891]       Joselito Group Classification System:  No Multiple pregnancy, No Transverse or oblique lie, No Breech lie, Gestational age is > or =37 weeks, Multiparous, Previous uterine scar +  is JOSELITO GROUP 5`    Complications:   None    Procedure and Technique:  Operative Findings:  Viable male  at 1010 with APGARs of 9 and 9 at 1 and 5 minutes. Fetus weight: 7lb 7oz.  Normal intact placenta with eccentrically inserted 3 vessel cord expressed at 1013.  1,5 cm right tubal cyst was noted  Normal uterus, bilateral tubes, and ovaries.  Blood gases:  See below:  Results from last 7 days   Lab Units 25  1010   PH COV  7.364   BASE EXC COV mmol/L -3.2*     Umbilical Cord Arterial Blood Gas:  Results from last 7 days   Lab Units 25  1010   PH COA  7.258   BASE EXC COA mmol/L -3.1*          Umbilical Cord Gasses  Arterial   Results from last 7 days   Lab Units 25  1010   PH COA  7.258   PCO2 COA  57.7   PO2 COA mm HG 16.3   HCO3 COA mmol/L 25.2   BASE EXC COA mmol/L -3.1*   O2 CONTENT CORD  ART ml/dl 7.3   O2 HGB, ARTERIAL CORD % 31.7       Venous   Results from last 7 days   Lab Units 05/19/25  1010   PH COV  7.364   PCO2 COV mm HG 39.1   HCO3 COV mmol/L 21.8   BASE EXC COV mmol/L -3.2*   O2 CT CD VB mL/dL 14.9   O2 HGB, VENOUS CORD % 67.1        Procedure and technique:  The patient was taken to the operating room. Spinal anesthesia was adequately established and Clindamycin, Gentamicin and Zithromax  was given for preoperative prophylaxis. The patient was then placed in the dorsal supine position with a left tilt of the hips. The patient was prepped with chlorhexidine for vaginal prep and chloraprep for abdominal prep and draped in the usual sterile fashion.    A time out was performed to confirm correct patient and correct procedure. An incision was made in the skin with a surgical scalpel, and sharp dissection was carried out over subsequent layers of tissue including the fascia, followed by the Bovie electrocautery for hemostasis. The fascia was incised at the midline, and the fascial incision was extended bilaterally bluntly and the underlying rectus muscles were dissected off bluntly. The rectus muscles were then divided at midline, and the peritoneum was identified and then entered and extended superiorly and inferiorly in blunt fashion. The bladder blade was inserted, and the vesicouterine peritoneum was identified.  A transverse incision was made in the lower uterine segment using a new surgical blade.  The uterine incision was extended cephalad and caudal using blunt dissection.  The amniotic sac was entered and the amniotic fluid was noted to be meconium .    The surgeon's hand was placed into the uterine cavity. The fetal head was identified and elevated through the uterine incision with the assistance of fundal pressure. With gentle traction, the shoulder was delivered, followed by the rest of the fetal body. There was no nuchal cord noted. On delivery, the cord was doubly clamped and  cut after delayed cord clamping. The infant was then passed off the table to the awaiting  staff. The  was noted to cry spontaneously and moved all extremities. Venous and arterial blood gas, cord blood, and portion of cord was obtained for analysis and routine blood testing. The placenta delivery was then sent to storage. Placenta was noted to be intact with an eccentrically inserted three-vessel cord. Oxytocin was administered by IV infusion to enhance uterine contraction. The uterus was exteriorized and cleared of all clots and remaining products of conception.    The uterine incision was re-approximated using 0 Vicryl in a running locked fashion. A second horizontal imbricating stitch with 0 Monocryl was applied. The uterine incision was examined and noted to be hemostatic. The posterior cul-de-sac was cleared of all clots and products of conception. The uterus was replaced into the abdomen, and the paracolic gutters were cleared of all clots.  The uterine incision was once again re-examined and noted to be hemostatic. The rectus muscles were examined and noted to be hemostatic The fascia was re-approximated using 0 Vicryl in a running nonlocked fashion. The subcutaneous tissue was irrigated and cleared of all clots and debris.  Good hemostasis was noted with Bovie electrocautery. The subcutaneous tissue was re-approximated with 2-0 Monocryl in running unlock fashion. The skin incision was closed with Stratafix in running fashion. Good hemostasis was noted.    Patient tolerated the procedure well. All needle, sponge, and instrument counts were noted to be correct x2 at the end of the procedure. Patient was transferred to the recovery room in stable condition. Dr. Ragland was present for the procedure.       Patient Disposition:  PACU         SIGNATURE: Carmelo Hicks Jr, MD  DATE: May 19, 2025  TIME: 11:05 AM

## 2025-05-19 NOTE — TELEPHONE ENCOUNTER
"FOLLOW UP: None needed    REASON FOR CONVERSATION: Rupture of Membranes    SYMPTOMS: ROM suspected    OTHER: On call provider, Mati leyva RN notified of patient's 15 minute ETA.    DISPOSITION: Go to LD Now    Reason for Disposition   Leakage of fluid from vagina  (Exception: Patient is uncertain, but thinks it might be urine incontinence.)    Answer Assessment - Initial Assessment Questions  1. ONSET: \"When did you notice the fluid coming out of your vagina?\"         About 10 minutes ago     2. CONTRACTIONS: \"Are you having any contractions?\" If Yes, ask: \"Describe the contractions that you are having.\" (e.g., duration, frequency, regularity, severity)      Denies     3. ANNA: \"What date are you expecting to deliver?\"      25    4. PARITY: \"Have you had a baby before?\" If Yes, ask: \"How long did the labor last?\"          5. FETAL MOVEMENT: \"Has the baby's movement decreased or changed significantly from normal?\"      Hasn't felt baby move since her water broke     6. OTHER SYMPTOMS: \"Do you have any other symptoms?\" (e.g., abdomen pain, fever, hand or face swelling, vaginal bleeding)      The fluid looks a little greenish-brown  Slight tinge of blood    Protocols used: Pregnancy - Rupture of Membranes Suspected-Adult-    "

## 2025-05-19 NOTE — ANESTHESIA POSTPROCEDURE EVALUATION
Post-Op Assessment Note    CV Status:  Stable    Pain management: adequate       Mental Status:  Alert and awake   Hydration Status:  Euvolemic   PONV Controlled:  Controlled   Airway Patency:  Patent     Post Op Vitals Reviewed: Yes    No anethesia notable event occurred.    Staff: other anesthesia staff, CRNA           Last Filed PACU Vitals:  Vitals Value Taken Time   Temp 97.4 F    Pulse 77 05/19/25 10:59   BP 98/53 05/19/25 11:00   Resp 15    SpO2 99 % 05/19/25 10:59   Vitals shown include unfiled device data.

## 2025-05-19 NOTE — H&P
H & P- Obstetrics   Sara Cruz 35 y.o. female MRN: [id number]  Unit/Bed#: LD TRIAGE 2- Encounter: [id number]    Assessment: 35 y.o.  at 38w4d admitted for leakage of fluids and contractions.  SVE: Pt refused  FHT: reactive 140 bpm, regular contractions  Clinical EFW: 7 lbs 7 oz (68%) 37w4d ; Cephalic confirmed by ultrasound  GBS status: negative   Postpartum contraception plan: Undecided    Plan:   Premature rupture of membranes  Assessment & Plan  Patient presenting regular contractions  Rupture membranes at 6:15 today 2025  Desires RLTCS    Anxiety  Assessment & Plan  Ordered home BUP and Fluoxetine    Early EPDS          Discussed case and plan w/ Dr. Ragland      Chief Complaint: leaking fluid    HPI: Sara Cruz is a 35 y.o.  with an ANNA of 2025, by Last Menstrual Period at 38w4d who is being admitted for leakage of fluids and contractions. She complains of uterine contractions, occurring every 4-5 minutes, has large amounts of fluid leaking. She states she has felt good FM.    Problem List[1]    Baby complications/comments: polyhydramnios 31.5 cm    Review of Systems   Constitutional:  Negative for chills, fever and unexpected weight change.   Respiratory:  Negative for chest tightness and shortness of breath.    Gastrointestinal:  Negative for abdominal pain.   Genitourinary:  Negative for difficulty urinating.       OB Hx:  OB History    Para Term  AB Living   2 1 1   1   SAB IAB Ectopic Multiple Live Births       1      # Outcome Date GA Lbr Luis/2nd Weight Sex Type Anes PTL Lv   2 Current            1 Term 22 40w1d  3730 g (8 lb 3.6 oz) F CS-Unspec EPI N OFELIA       Past Medical Hx:  Past Medical History:   Diagnosis Date    Anxiety     Depression     hx anxiety/dep - pp dep  Wellbutrin, Fluoxitene, Buspar    Elevated cortisol level 2021    Herpes     cold sores infrequent    Ingrown toenail     Migraine     increased seasonal    Varicella     pt had  angelo in childhood       Past Surgical hx:  Past Surgical History:   Procedure Laterality Date     SECTION  2022    TONSILECTOMY AND ADNOIDECTOMY         Social Hx:  Alcohol use: None  Tobacco use: None  Other substance use: None      Allergies[2]      Medications Prior to Admission:     buPROPion (WELLBUTRIN XL) 150 mg 24 hr tablet    docusate sodium (COLACE) 100 mg capsule    doxylamine (UNISOM) 25 MG tablet    famotidine (PEPCID) 20 mg tablet    FLUoxetine (PROzac) 40 MG capsule    ondansetron (ZOFRAN) 4 mg tablet    Prenatal MV-Min-Fe Fum-FA-DHA (PRENATAL 1 PO)    Objective:  Temp:  [97.8 °F (36.6 °C)] 97.8 °F (36.6 °C)  HR:  [81] 81  BP: (110)/(73) 110/73  Resp:  [18] 18  SpO2:  [98 %] 98 %  Body mass index is 34.54 kg/m².     Physical Exam:  OBGyn Exam       FHT:  Baseline Rate (FHR): 130 bpm  Variability: Moderate  Accelerations: 15 x 15 or greater  Decelerations: None    TOCO:   Contraction Frequency (minutes): 3-4  Contraction Duration (seconds): 80-90  Contraction Intensity: Mild    Lab Results   Component Value Date    WBC 9.16 2025    HGB 11.4 (L) 2025    HCT 34.7 (L) 2025     2025     Lab Results   Component Value Date    K 4.2 2023     2023    CO2 27 2023    BUN 14 2023    CREATININE 0.80 2023    AST 13 2023    ALT 13 2023     Prenatal Labs: Reviewed      Blood type: A +  Antibody: Negative  GBS: Negative  HIV: Negative  Rubella: Immune  Syphilis IgM/IgG: Non-reactive  HBsAg: Non- Reactive  HCAb: Non-reactive  Chlamydia: Negative  Gonorrhea: Negative  Diabetes 1 hour screen: Negative  3 hour glucose: N/A  Platelets: 214  Hgb: 11.4  <2 Midnights  INPATIENT     Signature/Title: Carmelo Hicks Jr, MD  Date: 2025  Time: 8:30 AM          [1]   Patient Active Problem List  Diagnosis    Fatigue    Anxiety    History of postpartum depression    History of tobacco use    BMI 33.0-33.9,adult    HSV (herpes simplex  virus) infection    Previous  delivery, antepartum    Pregnant    Obesity, Class I, BMI 30-34.9    Prenatal care in third trimester    Nausea and vomiting in pregnancy    Obesity complicating pregnancy, childbirth, or puerperium, antepartum    Polyhydramnios affecting pregnancy in third trimester    Premature rupture of membranes   [2]   Allergies  Allergen Reactions    Penicillins Hives

## 2025-05-20 LAB
ERYTHROCYTE [DISTWIDTH] IN BLOOD BY AUTOMATED COUNT: 13.1 % (ref 11.6–15.1)
HCT VFR BLD AUTO: 30.8 % (ref 34.8–46.1)
HGB BLD-MCNC: 9.8 G/DL (ref 11.5–15.4)
MCH RBC QN AUTO: 29.3 PG (ref 26.8–34.3)
MCHC RBC AUTO-ENTMCNC: 31.8 G/DL (ref 31.4–37.4)
MCV RBC AUTO: 92 FL (ref 82–98)
PLATELET # BLD AUTO: 143 THOUSANDS/UL (ref 149–390)
PMV BLD AUTO: 13.1 FL (ref 8.9–12.7)
RBC # BLD AUTO: 3.35 MILLION/UL (ref 3.81–5.12)
WBC # BLD AUTO: 10.99 THOUSAND/UL (ref 4.31–10.16)

## 2025-05-20 PROCEDURE — 85027 COMPLETE CBC AUTOMATED: CPT

## 2025-05-20 PROCEDURE — 99024 POSTOP FOLLOW-UP VISIT: CPT | Performed by: OBSTETRICS & GYNECOLOGY

## 2025-05-20 PROCEDURE — 99222 1ST HOSP IP/OBS MODERATE 55: CPT | Performed by: PHYSICIAN ASSISTANT

## 2025-05-20 RX ORDER — AMOXICILLIN 250 MG
1 CAPSULE ORAL
Status: DISCONTINUED | OUTPATIENT
Start: 2025-05-20 | End: 2025-05-21 | Stop reason: HOSPADM

## 2025-05-20 RX ORDER — IBUPROFEN 600 MG/1
600 TABLET, FILM COATED ORAL EVERY 6 HOURS PRN
Status: DISCONTINUED | OUTPATIENT
Start: 2025-05-20 | End: 2025-05-21 | Stop reason: HOSPADM

## 2025-05-20 RX ORDER — SIMETHICONE 80 MG
80 TABLET,CHEWABLE ORAL EVERY 6 HOURS PRN
Status: DISCONTINUED | OUTPATIENT
Start: 2025-05-20 | End: 2025-05-21 | Stop reason: HOSPADM

## 2025-05-20 RX ORDER — POLYETHYLENE GLYCOL 3350 17 G/17G
17 POWDER, FOR SOLUTION ORAL DAILY
Status: DISCONTINUED | OUTPATIENT
Start: 2025-05-20 | End: 2025-05-21 | Stop reason: HOSPADM

## 2025-05-20 RX ADMIN — ACETAMINOPHEN 650 MG: 325 TABLET ORAL at 23:14

## 2025-05-20 RX ADMIN — DOCUSATE SODIUM 100 MG: 100 CAPSULE, LIQUID FILLED ORAL at 08:55

## 2025-05-20 RX ADMIN — IBUPROFEN 600 MG: 600 TABLET, FILM COATED ORAL at 20:29

## 2025-05-20 RX ADMIN — ACETAMINOPHEN 650 MG: 325 TABLET ORAL at 01:59

## 2025-05-20 RX ADMIN — SIMETHICONE 80 MG: 80 TABLET, CHEWABLE ORAL at 01:59

## 2025-05-20 RX ADMIN — KETOROLAC TROMETHAMINE 15 MG: 30 INJECTION, SOLUTION INTRAMUSCULAR; INTRAVENOUS at 06:33

## 2025-05-20 RX ADMIN — IBUPROFEN 600 MG: 600 TABLET, FILM COATED ORAL at 12:30

## 2025-05-20 RX ADMIN — BUPROPION HYDROCHLORIDE 150 MG: 150 TABLET, EXTENDED RELEASE ORAL at 08:54

## 2025-05-20 RX ADMIN — SENNOSIDES AND DOCUSATE SODIUM 1 TABLET: 50; 8.6 TABLET ORAL at 23:14

## 2025-05-20 RX ADMIN — IRON SUCROSE 200 MG: 20 INJECTION, SOLUTION INTRAVENOUS at 08:55

## 2025-05-20 RX ADMIN — ACETAMINOPHEN 650 MG: 325 TABLET ORAL at 17:32

## 2025-05-20 RX ADMIN — ACETAMINOPHEN 650 MG: 325 TABLET ORAL at 08:55

## 2025-05-20 RX ADMIN — ENOXAPARIN SODIUM 40 MG: 40 INJECTION SUBCUTANEOUS at 08:55

## 2025-05-20 RX ADMIN — FLUOXETINE 40 MG: 10 CAPSULE ORAL at 08:54

## 2025-05-20 RX ADMIN — KETOROLAC TROMETHAMINE 15 MG: 30 INJECTION, SOLUTION INTRAMUSCULAR; INTRAVENOUS at 01:04

## 2025-05-20 RX ADMIN — DOCUSATE SODIUM 100 MG: 100 CAPSULE, LIQUID FILLED ORAL at 17:32

## 2025-05-20 NOTE — CONSULTS
Consultation - Acute Pain   Name: Sara Cruz 35 y.o. female I MRN: [id number]  Unit/Bed#: -01 I Date of Admission: 2025   Date of Service: 2025 I Hospital Day: 1   Inpatient consult to Acute Pain Service  Consult performed by: Satish Beard PA-C  Consult ordered by: Maxime Hutson MD        Physician Requesting Evaluation: Zaira Ragland MD   Reason for Evaluation / Principal Problem: Status post low-transverse     Assessment & Plan  Status post repeat low transverse  section  Acetaminophen 650 mg p.o. every 6 hours scheduled.  Ketorolac 15 mg IV every 6 hours x 24 hours followed by ibuprofen 600 mg p.o. every 6 hours scheduled.  Oxycodone 5 mg p.o. every 4 hours as needed moderate pain or 10 mg p.o. every 4 hours as needed severe pain.  Hydromorphone 0.5 mg IV every 2 hours as needed breakthrough pain.  Would discontinue this if used 2 or fewer times in 24 hours.  As needed Narcan in the event that opioid reversal becomes necessary.  Bowel regimen while on opioid pain medication.  Nalbuphine 5 mg IV every 3 hours as needed pruritus.  I have discussed the above management plan in detail with the primary service.   Please contact the [employer] role for the Acute Pain service with any questions/concerns.      APS will sign off at this time. Thank you for the consult. All opioids and other analgesics to be written at discretion of primary team. Please contact Acute Pain Service - via [employer] from [phone] with additional questions or concerns. See [email or web address] or [employer] for additional contacts and after hours information.    History of Present Illness    HPI: Sara Cruz is a 35 y.o. year old female who presents with minimal lower abdominal incisional pain following low-transverse  on 2025.  Patient had preoperative spinal analgesia with Duramorph.  Had mild itching which has resolved.  States pain is minimal.  Has no other side effects no other  complaints.    Current pain location(s): Pain Score: 2  Pain Location/Orientation: Location: Incision  Pain Scale: Pain Assessment Tool: 0-10  Current Analgesic regimen:  Acetaminophen 650 mg p.o. every 6 hours scheduled.  Ketorolac 15 mg IV every 6 hours scheduled x 25 hours followed by ibuprofen 600 mg p.o. every 6 hours scheduled.  Oxycodone 5 mg p.o. every 4 hours as needed moderate pain.  Oxycodone 10 mg p.o. every 4 hours.  Severe pain.  Hydromorphone 0.5 mg IV every 2 hours as needed breakthrough pain.    Pain History: None  Pain Management Physician: None  I have reviewed the patient's controlled substance dispensing history in the Prescription Drug Monitoring Program in compliance with the [facility] regulations before prescribing any controlled substances.     Review of Systems   Gastrointestinal:  Positive for abdominal pain.   All other systems reviewed and are negative.    Historical Information   Past Medical History:   Diagnosis Date    Anxiety     Depression     hx anxiety/dep - pp dep  Wellbutrin, Fluoxitene, Buspar    Elevated cortisol level 2021    Herpes     cold sores infrequent    Ingrown toenail     Migraine     increased seasonal    Varicella     pt had chiceknpox in childhood     Past Surgical History:   Procedure Laterality Date     SECTION  2022    TONSILECTOMY AND ADNOIDECTOMY       Social History     Tobacco Use    Smoking status: Never     Passive exposure: Past    Smokeless tobacco: Never   Vaping Use    Vaping status: Never Used   Substance and Sexual Activity    Alcohol use: Not Currently     Comment: social/ not at the moment due to preg    Drug use: Never     Types: Marijuana     Comment: not since >10 yrs    Sexual activity: Yes     Partners: Male     Birth control/protection: None     E-Cigarette/Vaping    E-Cigarette Use Never User      E-Cigarette/Vaping Substances    Nicotine No     THC No     CBD No     Flavoring No     Other No     Unknown No      Family  History   Problem Relation Age of Onset    Diabetes Mother         type 2    Diabetes type II Mother     Hypertension Mother     Heart disease Father     Coronary artery disease Father     Heart block Father     No Known Problems Brother     No Known Problems Brother     No Known Problems Maternal Grandmother     Cancer Maternal Grandfather         melanoma    Melanoma Maternal Grandfather     Cataracts Paternal Grandmother     Heart disease Paternal Grandfather     Coronary artery disease Paternal Grandfather      Social History     Tobacco Use    Smoking status: Never     Passive exposure: Past    Smokeless tobacco: Never   Vaping Use    Vaping status: Never Used   Substance and Sexual Activity    Alcohol use: Not Currently     Comment: social/ not at the moment due to preg    Drug use: Never     Types: Marijuana     Comment: not since >10 yrs    Sexual activity: Yes     Partners: Male     Birth control/protection: None       Current Facility-Administered Medications:     acetaminophen (TYLENOL) tablet 650 mg, Q6H MARITZA    buPROPion (WELLBUTRIN XL) 24 hr tablet 150 mg, Daily    calcium carbonate (TUMS) chewable tablet 1,000 mg, TID PRN    diphenhydrAMINE (BENADRYL) injection 25 mg, Q6H PRN    docusate sodium (COLACE) capsule 100 mg, BID    enoxaparin (LOVENOX) subcutaneous injection 40 mg, Q24H MARITZA    FLUoxetine (PROzac) capsule 40 mg, Daily    HYDROmorphone (DILAUDID) injection 0.5 mg, Q2H PRN    ketorolac (TORADOL) injection 15 mg, Q6H MARITZA **FOLLOWED BY** ibuprofen (MOTRIN) tablet 600 mg, Q6H    lactated ringers infusion, Continuous, Last Rate: Stopped (05/19/25 1912)    lactated ringers infusion, Continuous, Last Rate: 125 mL/hr (05/19/25 1913)    metoclopramide (REGLAN) injection 10 mg, Once PRN    nalbuphine (NUBAIN) injection 5 mg, Q3H PRN    naloxone (NARCAN) 0.04 mg/mL syringe 0.04 mg, Q1MIN PRN    ondansetron (ZOFRAN) injection 4 mg, Once PRN    ondansetron (ZOFRAN) injection 4 mg, Q6H PRN    oxyCODONE  (ROXICODONE) immediate release tablet 10 mg, Q4H PRN    oxyCODONE (ROXICODONE) IR tablet 5 mg, Q4H PRN    simethicone (MYLICON) chewable tablet 80 mg, Q6H PRN  Prior to Admission Medications   Prescriptions Last Dose Informant Patient Reported? Taking?   FLUoxetine (PROzac) 40 MG capsule 5/18/2025  Yes Yes   Sig: Take 40 mg by mouth in the morning.   Prenatal MV-Min-Fe Fum-FA-DHA (PRENATAL 1 PO) 5/18/2025  Yes Yes   buPROPion (WELLBUTRIN XL) 150 mg 24 hr tablet 5/18/2025  Yes Yes   Sig: Take 1 tablet by mouth in the morning   docusate sodium (COLACE) 100 mg capsule 5/18/2025  Yes Yes   Sig: Take 100 mg by mouth in the morning.   doxylamine (UNISOM) 25 MG tablet Past Week  No Yes   Sig: Take 0.5 tablets (12.5 mg total) by mouth daily at bedtime as needed for sleep   famotidine (PEPCID) 20 mg tablet 5/18/2025  No Yes   Sig: TAKE 1 TABLET BY MOUTH TWICE A DAY   ondansetron (ZOFRAN) 4 mg tablet Past Month  No Yes   Sig: Take 1 tablet (4 mg total) by mouth every 8 (eight) hours as needed for nausea or vomiting      Facility-Administered Medications: None     Penicillins    Objective :  Temp:  [97.4 °F (36.3 °C)-98.3 °F (36.8 °C)] 98.2 °F (36.8 °C)  HR:  [62-97] 78  BP: ()/(49-72) 110/72  Resp:  [15-20] 20  SpO2:  [96 %-100 %] 97 %  O2 Device: None (Room air)    Physical Exam  Vitals and nursing note reviewed.   Constitutional:       General: She is awake. She is not in acute distress.     Appearance: She is not ill-appearing, toxic-appearing or diaphoretic.     Skin:     General: Skin is warm and dry.     Neurological:      Mental Status: She is alert and oriented to person, place, and time.      GCS: GCS eye subscore is 4. GCS verbal subscore is 5. GCS motor subscore is 6.     Psychiatric:         Attention and Perception: Attention normal.         Speech: Speech normal.         Behavior: Behavior normal. Behavior is cooperative.          Lab Results: I have reviewed the following results:  CrCl cannot be  calculated (Patient's most recent lab result is older than the maximum 7 days allowed.).  Lab Results   Component Value Date    WBC 10.99 (H) 05/20/2025    HGB 9.8 (L) 05/20/2025    HCT 30.8 (L) 05/20/2025     (L) 05/20/2025         Component Value Date/Time    K 4.2 12/01/2023 1157     12/01/2023 1157    CO2 27 12/01/2023 1157    BUN 14 12/01/2023 1157    CREATININE 0.80 12/01/2023 1157         Component Value Date/Time    CALCIUM 9.6 12/01/2023 1157    ALKPHOS 50 12/01/2023 1157    AST 13 12/01/2023 1157    ALT 13 12/01/2023 1157    TP 7.2 12/01/2023 1157    ALB 4.5 12/01/2023 1157       Imaging Results Review: No pertinent imaging studies reviewed.  Other Study Results Review: No additional pertinent studies reviewed.    Administrative Statements   I have spent a total time of 23 minutes in caring for this patient on the day of the visit/encounter including Risks and benefits of tx options, Instructions for management, Patient and family education, Importance of tx compliance, Risk factor reductions, Impressions, Counseling / Coordination of care, Documenting in the medical record, Reviewing/placing orders in the medical record (including tests, medications, and/or procedures), Obtaining or reviewing history  , and Communicating with other healthcare professionals .

## 2025-05-20 NOTE — LACTATION NOTE
This note was copied from a baby's chart.  CONSULT - LACTATION  Baby Boy Cruz (Alison) 1 days male MRN: [id number]    [facility] AN NURSERY Room / Bed: (N)/(N) Encounter: [id number]    Maternal Information     MOTHER:  Sara Cruz  Maternal Age: 35 y.o.  OB History: # 1 - Date: 22, Sex: Female, Weight: 3730 g (8 lb 3.6 oz), GA: 40w1d, Type: , Unspecified, Apgar1: 9, Apgar5: 9, Living: Living, Birth Comments: None    # 2 - Date: 25, Sex: Male, Weight: 3385 g (7 lb 7.4 oz), GA: 38w4d, Type: , Low Transverse, Apgar1: 8, Apgar5: 9, Living: Living, Birth Comments: None   Previous breast reduction surgery? No    Lactation history:   Has patient previously breast fed: Yes   How long had patient previously breast fed: 2 months with nipple shield; pumped for 1 yr. had milk supply 1.5 for yr.   Previous breast feeding complications:       Past Surgical History:   Procedure Laterality Date     SECTION  2022    TONSILECTOMY AND ADNOIDECTOMY         Birth information:  YOB: 2024   Time of birth: 10:10 AM   Sex: male   Delivery type: , Low Transverse   Birth Weight: 3385 g (7 lb 7.4 oz)   Percent of Weight Change: -4%     Gestational Age: 38w4d   [unfilled]    Reason for Consult:    Reason for Consult: Initial assessment (ext) - 20 min, Pump Setup - 15 min, Alternate Feeding - 10 min, Discharge (routine) - 10 min    Risk Factors:    Risk Factors:  (dx of TT)    Breast and nipple assessment:   Breasts/Nipples  Date Pumping Initiated: 25  Time Pumping Initiated: 1459  Left Breast: Soft  Right Breast: Soft  Left Nipple: Everted, Other (Comment) (coompression stripe on face of nipple from 10-1)  Right Nipple: Everted  Intervention: Hand expression, Breast pump, Lanolin  Breastfeeding Progress: Not yet established, Other issues (comment) (dx of TT)    OB Lactation Tools:    Other OB Lactation Tools  Feeding Devices:  Syringe    Breast Pump:    Breast Pump  Pump: Personal, Electric, Manual (unimom;)  Pump Review/Education: Setup, frequency, and cleaning, Milk storage, Other (Comment) (cycle and hands on pumping)  Initiated by: c Hume, ibclc  Date Initiated: 25      [place] Assessment: no clinical assessment, baby is stressed after circ. And has pursed lips and tight oral cavity    Feeding assessment: no latch due to high cortisol levels      Feeding recommendations:  breast feed on demand Sara states that her first child could not latch without a nipple shield and even then was not effective. Baby was dx with tongue and lip tie and laser was used. Mom did not live in the area. Mom ended up excl. Pumping and fed bby milk for 1.5 yrs.     Mom states Esteban latches well and she has not pain. Esteban is sleepy and mom is concerned he may have a TT. Provider agrees but [place] is currently function on the breast. Mom has prenatal colostrum harvested and here to give to blackwood. Mom has a unimom pump from ins.     Measured flanges: 17 mm on L and 19 mm on R - out of stock - provided 21 mm flange.    Ed. On how to use multi-user pump and hand pump. Enc. Mom ot hand express as she transfers more milk.    Enc. To attempt at the breast. Allow for non-nutritive suck and cluster feeding. Enc. Mom to have [place] s2s after the circ. Procedure and what to expect the next few days.     Mom agrees to baby and me appt - ed. On frenotomy with Dr. Farheen montes de oca Ped. Dentist.     Enc. To call lactation for latch assessment when [place]wood is ready.    Ed. On 3rd party distributors that offer different flanges on-line. ie) [employer]. Names of reputable companies like [employer] (formerly [employer]) and [employer] offer flanges for every double electric pump. Lactation Hub will also provide a set of 12 mm to 19 mm flanges to fit most flanges.Enc. To try smaller flange and place a small amount of lanolin where the tunnel and funnel meet.     Ed. On various flange  sizes. Ed. On various flange circumferences that may fit the mother's breast better. Ed. On possible use of Pumping Pals or Lac[employer]ck flanges. Encouraged parents to call lactation once edema has dissipated to reassess the flange sizing.     Pumping:   - When pumping, begin in stimulation mode (high cycle, low vacuum) until milk begins to express. Change pump to expression mode (low cycle, high vacuum). Use hands on pumping techniques to assist with milk transfer. When milk stops expressing, change back to stimulation mode. When milk begins to flow, change to expression mode. You make cycle pump up to three times in a pumping session.    Mix Feeds:   Start every feeding at the breast. Offer both breasts or one breast and use breast compressions to achieve active suckling. Once baby is not actively suckling, bring baby in upright position and offer expressed milk and/or artificial supplementation via alternative feeding method (syringe, finger, paced bottle feeding). Burp frequently between breasts and during paced bottle feeding. Once feed is complete, place baby back on breast for on-nutritive suck. Pump after the feeding session to supplement with expressed milk at next feed.    Education on positioning and alignment. Mom is encouraged to:     - Bring baby up to the breast (use of pillows to elevate so baby's torso is against mom's breasts)   - Skin to skin for feedings with top hand exposed to show signs of satiation   - Chin deep into breast tissue (make baby look up to the nipple)   - nose aligned to the nipple   -Wait for wide gape, place chin behind the areola on the breast so nipple is at the nose. Once baby opens their mouth wide, the nipple will be aimed at the upper, back palate  - Cheeks should be touching breast, and baby should have a long neck   - Ear, shoulder, hip will be in alignment   - Deep, snug hold of baby up to the breast   - Every baby knows how to breathe at the breast. The tip of the nose may  "appear to touch the breast. Babies breathe from the side of the nasal passages. If baby can not breathe due to improper alignment, baby will unlatch    Education on creating a snug hold of your infant to the breast by verifying the infant's cheek is touching the breast, your infant's chin is deep into the breast tissue, your infant's arms are \"hugging\" the breast, and your infant's lips are flanged on the areola. Bring infant to the breast, not your breast to the infant. Latch should feel like a tugging sensation on the nipple.    Demonstrated with teach back breast compressions during a feeding to increase milk transfer and stimulate suckling after a breathing/muscle break.       (Scan QR code for [employer] - positions)   Review [employer] on youtube or scan QR code for [employer] video      [employer]        [employer] - positions      Christy Hume, MA 5/20/2025 3:01 PM  "

## 2025-05-20 NOTE — PLAN OF CARE
Problem: PAIN - ADULT  Goal: Verbalizes/displays adequate comfort level or baseline comfort level  Description: Interventions:  - Encourage patient to monitor pain and request assistance  - Assess pain using appropriate pain scale  - Administer analgesics as ordered based on type and severity of pain and evaluate response  - Implement non-pharmacological measures as appropriate and evaluate response  - Consider cultural and social influences on pain and pain management  - Notify physician/advanced practitioner if interventions unsuccessful or patient reports new pain  - Educate patient/family on pain management process including their role and importance of  reporting pain   - Provide non-pharmacologic/complimentary pain relief interventions  Outcome: Progressing     Problem: INFECTION - ADULT  Goal: Absence or prevention of progression during hospitalization  Description: INTERVENTIONS:  - Assess and monitor for signs and symptoms of infection  - Monitor lab/diagnostic results  - Monitor all insertion sites, i.e. indwelling lines, tubes, and drains  - Monitor endotracheal if appropriate and nasal secretions for changes in amount and color  - [place] appropriate cooling/warming therapies per order  - Administer medications as ordered  - Instruct and encourage patient and family to use good hand hygiene technique  - Identify and instruct in appropriate isolation precautions for identified infection/condition  Outcome: Progressing  Goal: Absence of fever/infection during neutropenic period  Description: INTERVENTIONS:  - Monitor WBC  - Perform strict hand hygiene  - Limit to healthy visitors only  - No plants, dried, fresh or silk flowers with galan in patient room  Outcome: Progressing     Problem: SAFETY ADULT  Goal: Patient will remain free of falls  Description: INTERVENTIONS:  - Educate patient/family on patient safety including physical limitations  - Instruct patient to call for assistance with activity   -  Consider consulting OT/PT to assist with strengthening/mobility based on AM PAC & JH-HLM score  - Consult OT/PT to assist with strengthening/mobility   - Keep Call bell within reach  - Keep bed low and locked with side rails adjusted as appropriate  - Keep care items and personal belongings within reach  - Initiate and maintain comfort rounds  - Make Fall Risk Sign visible to staff    - Apply yellow socks and bracelet for high fall risk patients  - Consider moving patient to room near nurses station  Outcome: Progressing  Goal: Maintain or return to baseline ADL function  Description: INTERVENTIONS:  -  Assess patient's ability to carry out ADLs; assess patient's baseline for ADL function and identify physical deficits which impact ability to perform ADLs (bathing, care of mouth/teeth, toileting, grooming, dressing, etc.)  - Assess/evaluate cause of self-care deficits   - Assess range of motion  - Assess patient's mobility; develop plan if impaired  - Assess patient's need for assistive devices and provide as appropriate  - Encourage maximum independence but intervene and supervise when necessary  - Involve family in performance of ADLs  - Assess for home care needs following discharge   - Consider OT consult to assist with ADL evaluation and planning for discharge  - Provide patient education as appropriate  - Monitor functional capacity and physical performance, use of AM PAC & JH-HLM   - Monitor gait, balance and fatigue with ambulation    Outcome: Progressing  Goal: Maintains/Returns to pre admission functional level  Description: INTERVENTIONS:  - Perform AM-PAC 6 Click Basic Mobility/ Daily Activity assessment daily.  - Set and communicate daily mobility goal to care team and patient/family/caregiver.   - Collaborate with rehabilitation services on mobility goals if consulted    - Out of bed for toileting  - Record patient progress and toleration of activity level   Outcome: Progressing     Problem: DISCHARGE  PLANNING  Goal: Discharge to home or other facility with appropriate resources  Description: INTERVENTIONS:  - Identify barriers to discharge w/patient and caregiver  - Arrange for needed discharge resources and transportation as appropriate  - Identify discharge learning needs (meds, wound care, etc.)  - Arrange for interpretive services to assist at discharge as needed  - Refer to Case Management Department for coordinating discharge planning if the patient needs post-hospital services based on physician/advanced practitioner order or complex needs related to functional status, cognitive ability, or social support system  Outcome: Progressing     Problem: Knowledge Deficit  Goal: Patient/family/caregiver demonstrates understanding of disease process, treatment plan, medications, and discharge instructions  Description: Complete learning assessment and assess knowledge base.  Interventions:  - Provide teaching at level of understanding  - Provide teaching via preferred learning methods  Outcome: Progressing     Problem: POSTPARTUM  Goal: Experiences normal postpartum course  Description: INTERVENTIONS:  - Monitor maternal vital signs  - Assess uterine involution and lochia  Outcome: Progressing  Goal: Appropriate maternal -  bonding  Description: INTERVENTIONS:  - Identify family support  - Assess for appropriate maternal/infant bonding   -Encourage maternal/infant bonding opportunities  - Referral to  or  as needed  Outcome: Progressing  Goal: Establishment of infant feeding pattern  Description: INTERVENTIONS:  - Assess breast/bottle feeding  - Refer to lactation as needed  Outcome: Progressing  Goal: Incision(s), wounds(s) or drain site(s) healing without S/S of infection  Description: INTERVENTIONS  - Assess and document dressing, incision, wound bed, drain sites and surrounding tissue  - Provide patient and family education    Outcome: Progressing

## 2025-05-20 NOTE — ASSESSMENT & PLAN NOTE
Acetaminophen 650 mg p.o. every 6 hours scheduled.  Ketorolac 15 mg IV every 6 hours x 24 hours followed by ibuprofen 600 mg p.o. every 6 hours scheduled.  Oxycodone 5 mg p.o. every 4 hours as needed moderate pain or 10 mg p.o. every 4 hours as needed severe pain.  Hydromorphone 0.5 mg IV every 2 hours as needed breakthrough pain.  Would discontinue this if used 2 or fewer times in 24 hours.  As needed Narcan in the event that opioid reversal becomes necessary.  Bowel regimen while on opioid pain medication.  Nalbuphine 5 mg IV every 3 hours as needed pruritus.

## 2025-05-20 NOTE — ANESTHESIA POSTPROCEDURE EVALUATION
Post-Op Assessment Note    CV Status:  Stable    Pain management: adequate       Mental Status:  Alert and awake   Hydration Status:  Stable   PONV Controlled:  Controlled   Airway Patency:  Patent     Post Op Vitals Reviewed: Yes    No anethesia notable event occurred.    Staff: Anesthesiologist           Last Filed PACU Vitals:  Vitals Value Taken Time   Temp 98 °F (36.7 °C) 05/19/25 12:00   Pulse 68 05/19/25 12:34   BP 99/65 05/19/25 12:31   Resp 18 05/19/25 12:30   SpO2 98 % 05/19/25 12:34   Vitals shown include unfiled device data.    Modified Janeth:     Vitals Value Taken Time   Activity 2 05/19/25 12:30   Respiration 2 05/19/25 12:30   Circulation 2 05/19/25 12:30   Consciousness 2 05/19/25 12:30   Oxygen Saturation 2 05/19/25 12:30     Modified Janeth Score: 10

## 2025-05-20 NOTE — PROGRESS NOTES
"Progress Note - OB/GYN   Name: Sara Cruz 35 y.o. female I MRN: [id number]  Unit/Bed#: -01 I Date of Admission: 2025   Date of Service: 2025 I Hospital Day: 1     Assessment & Plan  Status post repeat low transverse  section  [id number], Hgb 11.4 --> 9.8 (Venofer)  Mai out passed Void trial  DVT ppx:SCDs + Lovenox 40 mg qD POD#1  Continue routine post partum care  Encourage ambulation  Encourage breastfeeding  Contraception: Undecided  Anticipate discharge PPD 3 vs 4   Anxiety  Ordered home BUP and Fluoxetine    Early EPDS        Assessment:  Post partum Day #1 s/p RLTCS, stable, baby in the room    Subjective/Objective   Chief Complaint:     Post delivery. Patient is doing well. Lochia WNL. Pain well controlled.     Subjective:     Pain: yes, cramping, improved with meds  Tolerating PO: yes  Voiding: yes  Flatus: yes  Ambulating: yes  Chest pain: no  Shortness of breath: no  Leg pain: no  Lochia: minimal    Objective:     Vitals: BP (!) 85/61 (BP Location: Left arm) Comment: RN notified  Pulse 88   Temp 98 °F (36.7 °C) (Oral)   Resp 15   Ht 5' 3\" (1.6 m)   Wt 88.5 kg (195 lb)   LMP 2024 (Exact Date)   SpO2 97%   Breastfeeding Yes   BMI 34.54 kg/m²     I/O          0701   0700  0701   0700    P.O.  1140    I.V. (mL/kg)  2000 (22.6)    IV Piggyback  399.4    Total Intake(mL/kg)  3539.4 (40)    Urine (mL/kg/hr)  1975    Blood  354    Total Output  2329    Net  +1210.4                  Lab Results   Component Value Date    WBC 10.99 (H) 2025    HGB 9.8 (L) 2025    HCT 30.8 (L) 2025    MCV 92 2025     (L) 2025       Physical Exam:     Gen: AAOx3, NAD  CV: no acute distress  Lungs: no acute distress  Abd: Soft, non-tender, non-distended, no rebound or guarding  Uterine fundus firm and non-tender, 1 cm below the umbilicus. Incision c/d/I  Ext: Non tender    Carmelo Hicks MD  OB GYN PGY1  25  6:05 AM          "

## 2025-05-20 NOTE — PLAN OF CARE
Problem: PAIN - ADULT  Goal: Verbalizes/displays adequate comfort level or baseline comfort level  Description: Interventions:  - Encourage patient to monitor pain and request assistance  - Assess pain using appropriate pain scale  - Administer analgesics as ordered based on type and severity of pain and evaluate response  - Implement non-pharmacological measures as appropriate and evaluate response  - Consider cultural and social influences on pain and pain management  - Notify physician/advanced practitioner if interventions unsuccessful or patient reports new pain  - Educate patient/family on pain management process including their role and importance of  reporting pain   - Provide non-pharmacologic/complimentary pain relief interventions  Outcome: Progressing     Problem: INFECTION - ADULT  Goal: Absence or prevention of progression during hospitalization  Description: INTERVENTIONS:  - Assess and monitor for signs and symptoms of infection  - Monitor lab/diagnostic results  - Monitor all insertion sites, i.e. indwelling lines, tubes, and drains  - Monitor endotracheal if appropriate and nasal secretions for changes in amount and color  - [place] appropriate cooling/warming therapies per order  - Administer medications as ordered  - Instruct and encourage patient and family to use good hand hygiene technique  - Identify and instruct in appropriate isolation precautions for identified infection/condition  Outcome: Progressing  Goal: Absence of fever/infection during neutropenic period  Description: INTERVENTIONS:  - Monitor WBC  - Perform strict hand hygiene  - Limit to healthy visitors only  - No plants, dried, fresh or silk flowers with galan in patient room  Outcome: Progressing     Problem: SAFETY ADULT  Goal: Patient will remain free of falls  Description: INTERVENTIONS:  - Educate patient/family on patient safety including physical limitations  - Instruct patient to call for assistance with activity   -  Consider consulting OT/PT to assist with strengthening/mobility based on AM PAC & JH-HLM score  - Consult OT/PT to assist with strengthening/mobility   - Keep Call bell within reach  - Keep bed low and locked with side rails adjusted as appropriate  - Keep care items and personal belongings within reach  - Initiate and maintain comfort rounds  - Make Fall Risk Sign visible to staff  - Offer Toileting every  Hours, in advance of need  - Initiate/Maintain alarm  - Obtain necessary fall risk management equipment:   - Apply yellow socks and bracelet for high fall risk patients  - Consider moving patient to room near nurses station  Outcome: Progressing  Goal: Maintain or return to baseline ADL function  Description: INTERVENTIONS:  -  Assess patient's ability to carry out ADLs; assess patient's baseline for ADL function and identify physical deficits which impact ability to perform ADLs (bathing, care of mouth/teeth, toileting, grooming, dressing, etc.)  - Assess/evaluate cause of self-care deficits   - Assess range of motion  - Assess patient's mobility; develop plan if impaired  - Assess patient's need for assistive devices and provide as appropriate  - Encourage maximum independence but intervene and supervise when necessary  - Involve family in performance of ADLs  - Assess for home care needs following discharge   - Consider OT consult to assist with ADL evaluation and planning for discharge  - Provide patient education as appropriate  - Monitor functional capacity and physical performance, use of AM PAC & JH-HLM   - Monitor gait, balance and fatigue with ambulation    Outcome: Progressing  Goal: Maintains/Returns to pre admission functional level  Description: INTERVENTIONS:  - Perform AM-PAC 6 Click Basic Mobility/ Daily Activity assessment daily.  - Set and communicate daily mobility goal to care team and patient/family/caregiver.   - Collaborate with rehabilitation services on mobility goals if consulted  -  Perform Range of Motion  times a day.  - Reposition patient every  hours.  - Dangle patient  times a day  - Stand patient  times a day  - Ambulate patient  times a day  - Out of bed to chair  times a day   - Out of bed for meals  times a day  - Out of bed for toileting  - Record patient progress and toleration of activity level   Outcome: Progressing     Problem: DISCHARGE PLANNING  Goal: Discharge to home or other facility with appropriate resources  Description: INTERVENTIONS:  - Identify barriers to discharge w/patient and caregiver  - Arrange for needed discharge resources and transportation as appropriate  - Identify discharge learning needs (meds, wound care, etc.)  - Arrange for interpretive services to assist at discharge as needed  - Refer to Case Management Department for coordinating discharge planning if the patient needs post-hospital services based on physician/advanced practitioner order or complex needs related to functional status, cognitive ability, or social support system  Outcome: Progressing     Problem: Knowledge Deficit  Goal: Patient/family/caregiver demonstrates understanding of disease process, treatment plan, medications, and discharge instructions  Description: Complete learning assessment and assess knowledge base.  Interventions:  - Provide teaching at level of understanding  - Provide teaching via preferred learning methods  Outcome: Progressing     Problem: POSTPARTUM  Goal: Experiences normal postpartum course  Description: INTERVENTIONS:  - Monitor maternal vital signs  - Assess uterine involution and lochia  Outcome: Progressing  Goal: Appropriate maternal -  bonding  Description: INTERVENTIONS:  - Identify family support  - Assess for appropriate maternal/infant bonding   -Encourage maternal/infant bonding opportunities  - Referral to  or  as needed  Outcome: Progressing  Goal: Establishment of infant feeding pattern  Description: INTERVENTIONS:  -  Assess breast/bottle feeding  - Refer to lactation as needed  Outcome: Progressing  Goal: Incision(s), wounds(s) or drain site(s) healing without S/S of infection  Description: INTERVENTIONS  - Assess and document dressing, incision, wound bed, drain sites and surrounding tissue  - Provide patient and family education  - Perform skin care/dressing changes every   Outcome: Progressing

## 2025-05-21 VITALS
HEIGHT: 63 IN | RESPIRATION RATE: 20 BRPM | DIASTOLIC BLOOD PRESSURE: 58 MMHG | BODY MASS INDEX: 34.55 KG/M2 | TEMPERATURE: 97.6 F | WEIGHT: 195 LBS | SYSTOLIC BLOOD PRESSURE: 108 MMHG | HEART RATE: 92 BPM | OXYGEN SATURATION: 100 %

## 2025-05-21 PROCEDURE — 99024 POSTOP FOLLOW-UP VISIT: CPT | Performed by: PHYSICIAN ASSISTANT

## 2025-05-21 RX ORDER — OXYCODONE HYDROCHLORIDE 5 MG/1
5 TABLET ORAL EVERY 4 HOURS PRN
Qty: 10 TABLET | Refills: 0 | Status: SHIPPED | OUTPATIENT
Start: 2025-05-21 | End: 2025-05-31

## 2025-05-21 RX ORDER — IBUPROFEN 600 MG/1
600 TABLET, FILM COATED ORAL EVERY 6 HOURS PRN
Qty: 30 TABLET | Refills: 0 | Status: SHIPPED | OUTPATIENT
Start: 2025-05-21

## 2025-05-21 RX ORDER — AMOXICILLIN 250 MG
1 CAPSULE ORAL
Qty: 30 TABLET | Refills: 0 | Status: SHIPPED | OUTPATIENT
Start: 2025-05-21

## 2025-05-21 RX ORDER — POLYETHYLENE GLYCOL 3350 17 G/17G
17 POWDER, FOR SOLUTION ORAL DAILY
Qty: 30 EACH | Refills: 0 | Status: SHIPPED | OUTPATIENT
Start: 2025-05-21

## 2025-05-21 RX ORDER — CALCIUM CARBONATE 500 MG/1
1000 TABLET, CHEWABLE ORAL 3 TIMES DAILY PRN
Qty: 60 TABLET | Refills: 0 | Status: SHIPPED | OUTPATIENT
Start: 2025-05-21

## 2025-05-21 RX ORDER — SIMETHICONE 80 MG
80 TABLET,CHEWABLE ORAL EVERY 6 HOURS PRN
Qty: 30 TABLET | Refills: 0 | Status: SHIPPED | OUTPATIENT
Start: 2025-05-21

## 2025-05-21 RX ORDER — ACETAMINOPHEN 325 MG/1
650 TABLET ORAL EVERY 6 HOURS SCHEDULED
Qty: 12 TABLET | Refills: 0 | Status: SHIPPED | OUTPATIENT
Start: 2025-05-21 | End: 2025-05-23

## 2025-05-21 RX ADMIN — DOCUSATE SODIUM 100 MG: 100 CAPSULE, LIQUID FILLED ORAL at 08:26

## 2025-05-21 RX ADMIN — ACETAMINOPHEN 650 MG: 325 TABLET ORAL at 06:15

## 2025-05-21 RX ADMIN — IBUPROFEN 600 MG: 600 TABLET, FILM COATED ORAL at 04:22

## 2025-05-21 RX ADMIN — ENOXAPARIN SODIUM 40 MG: 40 INJECTION SUBCUTANEOUS at 08:26

## 2025-05-21 RX ADMIN — BUPROPION HYDROCHLORIDE 150 MG: 150 TABLET, EXTENDED RELEASE ORAL at 08:26

## 2025-05-21 RX ADMIN — FLUOXETINE 40 MG: 10 CAPSULE ORAL at 08:27

## 2025-05-21 RX ADMIN — POLYETHYLENE GLYCOL 3350 17 G: 17 POWDER, FOR SOLUTION ORAL at 04:22

## 2025-05-21 RX ADMIN — ACETAMINOPHEN 650 MG: 325 TABLET ORAL at 12:22

## 2025-05-21 NOTE — ASSESSMENT & PLAN NOTE
[id number], Hgb 11.4 --> 9.8 (Venofer)  Mai out passed Void trial  DVT ppx:SCDs + Lovenox 40 mg qD POD#1  Continue routine post partum care  Encourage ambulation  Encourage breastfeeding  Contraception: Undecided  Anticipate discharge PPD 2  Plan EPDS before d/c  Plan f/u 1 week in the office

## 2025-05-21 NOTE — CONSULTS
This note was copied from a baby's chart.  CONSULT - LACTATION  Baby Boy Cruz (Alison) 2 days male MRN: [id number]    [facility] NURSERY Room / Bed: (N)/(N) Encounter: [id number]    Maternal Information     MOTHER:  Sara Cruz  Maternal Age: 35 y.o.  OB History: # 1 - Date: 22, Sex: Female, Weight: 3730 g (8 lb 3.6 oz), GA: 40w1d, Type: , Unspecified, Apgar1: 9, Apgar5: 9, Living: Living, Birth Comments: None    # 2 - Date: 25, Sex: Male, Weight: 3385 g (7 lb 7.4 oz), GA: 38w4d, Type: , Low Transverse, Apgar1: 8, Apgar5: 9, Living: Living, Birth Comments: None   Previous breast reduction surgery? No    Lactation history:   Has patient previously breast fed: Yes   How long had patient previously breast fed: 2 mo. with nipple shield, pumped for 1 yr. had milk 1. 5 ys.   Previous breast feeding complications: Other (Comment) (hx of dx of TT)     Past Surgical History:   Procedure Laterality Date     SECTION  2022    MO  DELIVERY ONLY N/A 2025    Procedure:  SECTION () REPEAT;  Surgeon: Zaira Ragland MD;  Location: AN ;  Service: Obstetrics    TONSILECTOMY AND ADNOIDECTOMY         Birth information:  YOB: 2024   Time of birth: 10:10 AM   Sex: male   Delivery type: , Low Transverse   Birth Weight: 3385 g (7 lb 7.4 oz)   Percent of Weight Change: -8%     Gestational Age: 38w4d   [unfilled]    Reason for Consult:    Reason for Consult: Latch Assess (ext) - 20 min, Latch Assess (routine) - 10 min, Discharge (routine) - 10 min, Alternate Feeding - 10 min    Risk Factors:    Risk Factors: Maternal anatomy    Breast and nipple assessment:   Breasts/Nipples  Date Pumping Initiated: 25  Time Pumping Initiated: 1459  Left Breast: Filling, Other (Comment) (pendulous; dark with visible bergeron glands)  Right Breast: Filling, Other (Comment) (pendulous; nipples faces  downward; darker areolas; visible bergeron glands)  Left Nipple: Everted, Other (Comment) (compression stripe on the nipple face)  Right Nipple: Everted, Tender, Other (Comment) (nipples facing downward)  Intervention: Lanolin, Breast pump, Breast shells, Hand expression, Other (comment) (smaller flanges (17 on the L, 19 on the R))  Breastfeeding Progress: Not yet established, Latch issues  Reasons for not Breastfeeding: Infant medical condition    OB Lactation Tools:    Other OB Lactation Tools  Feeding Devices: Lanolin, Pump, Shells, Syringe, Feeding Cup, Other (Comment) (wound healing; smaller flanges)    Breast Pump:    Breast Pump  Pump: Personal, Electric, Manual (unimom)  Pump Review/Education: Setup, frequency, and cleaning, Milk storage, Other (Comment) (cycle and hands on pumping)  Initiated by: c hume, ibclc  Date Initiated: 25       Assessment: restricted tongue movement and visible muscle fatigue    Feeding assessment: latch difficulty (due to oral restriction)  LATCH:  Latch: Repeated attempts, hold nipple in mouth, stimulate to suck   Audible Swallowing: A few with stimulation   Type of Nipple: Everted (After stimulation)   Comfort (Breast/Nipple): Filling, red/small blisters/bruises, mild/moderate discomfort   Hold (Positioning): Partial assist, teach one side, mother does other, staff holds   LATCH Score: 6       [place]:    Appearance Items  Appearance of tongue when lifted: Slight cleft in tip apparent  Elasticity of frenulum: Little or no elasticity  Length of lingual frenulum when tongue lifted: Slight cleft in tip apparent  Attachment of lingual frenulum to tongue: Notched tip  Attachment of lingual frenulum to tongue: Notched tip: Attached at ridge  Appearance of tongue when lifted: Class III: severe, 3-7 mm  Appearance Score: 2    Function Items  Lateralization: None  Lift of tongue: Tip stays at lower alveloar ridge or rises to mid-mouth only with jaw closure  Extension of  tongue: Tip over lower gum only  Spread of anterior tongue: Little or none  Cupping: Side edges only, moderate cup  Peristalsis: None or reverse motion  Snapback: Frequent or with each suck  Function Score: 2         Feeding recommendations:  mixed feeding plan created. Mom has hx of TT and Lip tie with first child. Laser was used by Provider in [place].       Discussion of General Lactation Issues: painful wound on the left breast - desiress to excl. Breastfeed  First nursing/attempt < 1 hour after birth:c/s   Skin to skin following delivery:unknown   Full term:38 w 4 days    labor:unknown       Interval Breastfeeding History:    Frequency of breast feeding: attempting every 2-3 hrs  Does mother feel breastfeeding is effective: If no, explain: full breasts  Does infant appear satisfied after nursing:If no, explain: cluster feeding overnight  Stooling pattern normal:Yes  Urinating frequently:Yes      Alternative/Artificial Feedings:   Syringe/Finger: Yes, provided for expressed milk transferred                     Breast Milk:                      Amount: up to 15 mls of expressed after feeding at the breast            Frequency: feeding on demand      Mother Assessment:  Breast: pendulous breasts with Right's nipple facing downward; dark,med. Sized areolas and visible bergeron glands  Nipple Assessment in General: L has compression stripe from 12-4; R is tender and swollen  Mother's Awareness of Feeding Cues                 Recognizes: Yes                  Verbalizes: Yes  Support System: FOB  History of Breastfeeding: attempted to latch with first - she did not latch, then excl. Pumped for 1 year with mom having enocugh milk for 1.5 yrs  Changes/Stressors/Violence: latch is painful; family hx of TT  Concerns/Goals: Desires to excl. breastfeed      Infant Assessment:  Behaviors: late stage feeding cues    General Appearance:  Alert, active, no distress                            Head:  Normocephalic,  "AFOF, sutures opposed                            Eyes:   Conjunctiva clear, no drainage                            Ears:   Normally placed, no anomolies                           Nose:   Septum intact, no drainage or erythema                          Mouth:  No lesions; visible suck blister on the top lip; Small oral gape; Tongue elevates at tip only; visible forked tip; bilateral laterization presents as twisting with snap-back noted; extension of tip over lower alveolar ridge only; Frenulum is connected  at tip of tongue and on top of lower alveolar ridge; Appears thin, then thick from mid-anterior. Digital suck reflex is minimal with reverse peristalic movement                   Neck:  possible positional preference to the left      [place] Latch After Lactation Education/Consult:  Efficiency: football on the left and cross cradle on the right              Lips Flanged: Yes, with demonstration of chin against the breast behind the areola, bring nipple to the nose              Depth of latch: deeper with 'U\" shape hold of the breast              Audible Swallow: Yes, due to mother's great milk supply              Visible Milk: Yes, transitioning              Wide Open/ Asymmetrical: No              Suck Swallow Cycle: Breathing: yes, Coordinated: some  Nipple Assessment after latch: Normal: elongated/eraser, no discoloration and no damage noted.  Latch Problems: visible TT; thick upper labial frenulum    Position After Lactation Education/Consult:  Infant's Ergonomics/Body               Body Alignment: Yes, with demonstration and teach back of hip on top of hip, shoulder on top of shoulder; chin behind the areolas and nipple to nose               Head Supported: Yes, enc. Snug hold and fingers on baby's lower mandible and snug hold between the shoulder blades               Close to Mom's body/ Lifted/ Supported: Yes, enc. Tucking the baby into the side of mom's body               Mom's Ergonomics/Body: Yes, with " demonstration and teach back                           Supported: Yes                           Sitting Back: Yes                           Brings Baby to her breast: with demonstration and teach back  Positioning Problems: alignment - lower mandible support    Equipment:  Hospital Grade Pump: Yes     Manual Pump: Yes     Personal Pump            Type: unimom              Shells            Type: Ameda            Frequency of use: provided      Wound Care: Yes     Lanolin: Yes     Bottle Feeding with volume feeders: at home with demonstration and teach back       Feeding Plan:     Mix Feeds:   Start every feeding at the breast. Offer both breasts or one breast and use breast compressions to achieve active suckling. Once baby is not actively suckling, bring baby in upright position and offer expressed milk and/or artificial supplementation via alternative feeding method (syringe, finger, paced bottle feeding). Burp frequently between breasts and during paced bottle feeding. Once feed is complete, place baby back on breast for on-nutritive suck. Pump after the feeding session to supplement with expressed milk at next feed.    Education of breastfeeding with large breasts. Demonstration and teach back of positioning and alignment. Use pillows, tables, rolled towels/blankets to lift breast. Lift baby up to breast level. Education on hand expression prior to latch, positioning of hand to compress the breast, and positioning and alignment of baby for deep latch with large breasts.     Mom's nipple everts with stimulation. With nipple compression, short shank noted. Education on ways to elongate the nipple: Hand expression, Latch assist, breast shells, supple cups, manual and electric pump stimulation.     Education on positioning and alignment. Mom is encouraged to:     - Bring baby up to the breast (use of pillows to elevate so baby's torso is against mom's breasts)   - Skin to skin for feedings with top hand exposed to  show signs of satiation   - Chin deep into breast tissue (make baby look up to the nipple)   - nose aligned to the nipple   -Wait for wide gape, place chin behind the areola on the breast so nipple is at the nose. Once baby opens their mouth wide, the nipple will be aimed at the upper, back palate  - Cheeks should be touching breast, and baby should have a long neck   - Ear, shoulder, hip will be in alignment   - Deep, snug hold of baby up to the breast   - Every baby knows how to breathe at the breast. The tip of the nose may appear to touch the breast. Babies breathe from the side of the nasal passages. If baby can not breathe due to improper alignment, baby will unlatch    Demonstrated with teach back breast compressions during a feeding to increase milk transfer and stimulate suckling after a breathing/muscle break.     Discharge feeding plan    1. Meet early feeding cues  2. Use massage, warmth, hand expression to stimulate breasts  3. Bring baby to breast skin to skin  4. Align nipple to nose, place chin behind the areola and on the breast, (move baby not breast) bring baby to breast when mouth is wide and deep latch is achieved. (Scan QR code for [employer] - positions). Use snug hold and shoulder compression to keep baby belly to belly and to maintain the latch.  5. Use breast compressions to stimulate suck  6. Once baby does not suck with stimulation, becomes fussy, or un-latches, feed expressed milk or supplementation via paced bottle feeding method. Review Milkmob on youtube or scan QR code for MilkMob video  7. Bring baby to opposite breast for feeding, non-nutritive suck to create milk supply, and skin to skin  8. If you are concerned about milk supply, pump after each feed to stimulate breasts and have expressed milk for next feed  Do not pump for longer than 10 min. If baby latched to the breast. If baby did not latch to the breast, pump for 20 min.       Milk Mob        [employer]  "[employer] - positions    Ed. On 3rd party distributors that offer different flanges on-line. ie) [employer]. Names of reputable companies like [employer] and [employer] offer flanges for every double electric pump. Lactation Hub will also provide a set of 12 mm to 19 mm flanges to fit most flanges.Enc. To try smaller flange and place a small amount of lanolin where the tunnel and funnel meet.     Ed. On various flange sizes. Ed. On various flange circumferences that may fit the mother's breast better. Ed. On possible use of Pumping Pals or LacTeck flanges. Encouraged parents to call lactation once edema has dissipated to reassess the flange sizing.     To help your nipples heal, in addition to paying close attention to latch and positioning, moist would healing is recommended. Apply organic coconut or olive oil, or lanolin/nipple cream to the nipple and cover with a small piece of wax/parcment paper. Remove before feeding and apply new cream and paper after each feeding. apply protective ointment after feeding or pumping and cover with an occlusive dressing.    Nurse on demand: when baby gives hunger cues; when your breasts feel full, or at least every 3 hours during the day and every 5 hours at night counting from the beginning of one feeding to the beginning of the next; which ever comes first. When sucking and swallowing slow, gently compress the breast to restart flow. If active suck-swallow does not restart, gently remove the baby and offer the other breast; offering up to \"four\" breasts per feeding.      Christy Hume, MA 5/21/2025 11:13 AM  "

## 2025-05-21 NOTE — PLAN OF CARE
Problem: PAIN - ADULT  Goal: Verbalizes/displays adequate comfort level or baseline comfort level  Description: Interventions:  - Encourage patient to monitor pain and request assistance  - Assess pain using appropriate pain scale  - Administer analgesics as ordered based on type and severity of pain and evaluate response  - Implement non-pharmacological measures as appropriate and evaluate response  - Consider cultural and social influences on pain and pain management  - Notify physician/advanced practitioner if interventions unsuccessful or patient reports new pain  - Educate patient/family on pain management process including their role and importance of  reporting pain   - Provide non-pharmacologic/complimentary pain relief interventions  Outcome: Progressing     Problem: INFECTION - ADULT  Goal: Absence or prevention of progression during hospitalization  Description: INTERVENTIONS:  - Assess and monitor for signs and symptoms of infection  - Monitor lab/diagnostic results  - Monitor all insertion sites, i.e. indwelling lines, tubes, and drains  - Monitor endotracheal if appropriate and nasal secretions for changes in amount and color  - [place] appropriate cooling/warming therapies per order  - Administer medications as ordered  - Instruct and encourage patient and family to use good hand hygiene technique  - Identify and instruct in appropriate isolation precautions for identified infection/condition  Outcome: Progressing  Goal: Absence of fever/infection during neutropenic period  Description: INTERVENTIONS:  - Monitor WBC  - Perform strict hand hygiene  - Limit to healthy visitors only  - No plants, dried, fresh or silk flowers with galan in patient room  Outcome: Progressing     Problem: SAFETY ADULT  Goal: Patient will remain free of falls  Description: INTERVENTIONS:  - Educate patient/family on patient safety including physical limitations  - Instruct patient to call for assistance with activity   -  Consider consulting OT/PT to assist with strengthening/mobility based on AM PAC & JH-HLM score  - Consult OT/PT to assist with strengthening/mobility   - Keep Call bell within reach  - Keep bed low and locked with side rails adjusted as appropriate  - Keep care items and personal belongings within reach  - Initiate and maintain comfort rounds  - Make Fall Risk Sign visible to staff    - Apply yellow socks and bracelet for high fall risk patients  - Consider moving patient to room near nurses station  Outcome: Progressing  Goal: Maintain or return to baseline ADL function  Description: INTERVENTIONS:  -  Assess patient's ability to carry out ADLs; assess patient's baseline for ADL function and identify physical deficits which impact ability to perform ADLs (bathing, care of mouth/teeth, toileting, grooming, dressing, etc.)  - Assess/evaluate cause of self-care deficits   - Assess range of motion  - Assess patient's mobility; develop plan if impaired  - Assess patient's need for assistive devices and provide as appropriate  - Encourage maximum independence but intervene and supervise when necessary  - Involve family in performance of ADLs  - Assess for home care needs following discharge   - Consider OT consult to assist with ADL evaluation and planning for discharge  - Provide patient education as appropriate  - Monitor functional capacity and physical performance, use of AM PAC & JH-HLM   - Monitor gait, balance and fatigue with ambulation    Outcome: Progressing  Goal: Maintains/Returns to pre admission functional level  Description: INTERVENTIONS:  - Perform AM-PAC 6 Click Basic Mobility/ Daily Activity assessment daily.  - Set and communicate daily mobility goal to care team and patient/family/caregiver.   - Collaborate with rehabilitation services on mobility goals if consulted    - Out of bed for toileting  - Record patient progress and toleration of activity level   Outcome: Progressing     Problem: DISCHARGE  PLANNING  Goal: Discharge to home or other facility with appropriate resources  Description: INTERVENTIONS:  - Identify barriers to discharge w/patient and caregiver  - Arrange for needed discharge resources and transportation as appropriate  - Identify discharge learning needs (meds, wound care, etc.)  - Arrange for interpretive services to assist at discharge as needed  - Refer to Case Management Department for coordinating discharge planning if the patient needs post-hospital services based on physician/advanced practitioner order or complex needs related to functional status, cognitive ability, or social support system  Outcome: Progressing     Problem: Knowledge Deficit  Goal: Patient/family/caregiver demonstrates understanding of disease process, treatment plan, medications, and discharge instructions  Description: Complete learning assessment and assess knowledge base.  Interventions:  - Provide teaching at level of understanding  - Provide teaching via preferred learning methods  Outcome: Progressing     Problem: POSTPARTUM  Goal: Experiences normal postpartum course  Description: INTERVENTIONS:  - Monitor maternal vital signs  - Assess uterine involution and lochia  Outcome: Progressing  Goal: Appropriate maternal -  bonding  Description: INTERVENTIONS:  - Identify family support  - Assess for appropriate maternal/infant bonding   -Encourage maternal/infant bonding opportunities  - Referral to  or  as needed  Outcome: Progressing  Goal: Establishment of infant feeding pattern  Description: INTERVENTIONS:  - Assess breast/bottle feeding  - Refer to lactation as needed  Outcome: Progressing  Goal: Incision(s), wounds(s) or drain site(s) healing without S/S of infection  Description: INTERVENTIONS  - Assess and document dressing, incision, wound bed, drain sites and surrounding tissue  - Provide patient and family education    Outcome: Progressing

## 2025-05-21 NOTE — PROGRESS NOTES
"Progress Note - OB/GYN  Sara Cruz 35 y.o. female MRN: [id number]  Unit/Bed#:  308-01 Encounter: [id number]    Assessment and Plan     Sara Cruz is a patient of: [facility]. She is PPD# 2 s/p  repeat  section, low transverse incision  Recovering well and is stable. Pt desires to d/c to home today once baby cleared after repat hearing screening.       History of postpartum depression  Assessment & Plan  EPDS to be done prior to d/c then repeated in office 1 week    * Status post repeat low transverse  section  Assessment & Plan  [id number], Hgb 11.4 --> 9.8 (Venofer)  Mai out passed Void trial  DVT ppx:SCDs + Lovenox 40 mg qD POD#1  Continue routine post partum care  Encourage ambulation  Encourage breastfeeding  Contraception: Undecided  Anticipate discharge PPD 2  Plan EPDS before d/c  Plan f/u 1 week in the office        Disposition    - Anticipate discharge home on PPD# 2      Subjective/Objective     Chief Complaint: Postpartum State     Subjective:    Sara Cruz is PPD/POD#2 s/p  repeat  section, low transverse incision. She has no current complaints.  Pain is well controlled. She is recovering well and is stable.     Breastfeeding:  yes    Tolerating PO: yes  Nausea or Vomiting: no  Voiding: yes  Flatus: yes; has had a small bowel movement, but notes she need to have another. She recently used stool softeners in hopes of a more robust BM .   Ambulating: yes  Chest pain: no  Shortness of breath: no  Leg pain: no  Lochia: appropriate     Vitals:   /73 (BP Location: Right arm)   Pulse 80   Temp 97.9 °F (36.6 °C) (Oral)   Resp 18   Ht 5' 3\" (1.6 m)   Wt 88.5 kg (195 lb)   LMP 2024 (Exact Date)   SpO2 98%   Breastfeeding Yes   BMI 34.54 kg/m²     No intake or output data in the 24 hours ending 25 0807    Invasive Devices       None                   Physical Exam:   GEN: Sara Cruz appears well, alert and oriented x 3, pleasant and cooperative "   CARDIO: RRR, no murmurs or rubs  RESP:  CTAB, no wheezes or rales  ABDOMEN: soft, no tenderness, no distention, fundus @ umb, Incision C/D/I  EXTREMITIES: SCDs on, non tender, no erythema, b/l Jef's sign negative      Labs:     Hemoglobin   Date Value Ref Range Status   05/20/2025 9.8 (L) 11.5 - 15.4 g/dL Final   05/19/2025 12.3 11.5 - 15.4 g/dL Final     WBC   Date Value Ref Range Status   05/20/2025 10.99 (H) 4.31 - 10.16 Thousand/uL Final   05/19/2025 9.40 4.31 - 10.16 Thousand/uL Final     Platelets   Date Value Ref Range Status   05/20/2025 143 (L) 149 - 390 Thousands/uL Final     Comment:     Results verified by repeat   05/19/2025 155 149 - 390 Thousands/uL Final     Creatinine   Date Value Ref Range Status   12/01/2023 0.80 0.60 - 1.30 mg/dL Final     Comment:     Standardized to IDMS reference method     AST   Date Value Ref Range Status   12/01/2023 13 13 - 39 U/L Final     ALT   Date Value Ref Range Status   12/01/2023 13 7 - 52 U/L Final     Comment:     Specimen collection should occur prior to Sulfasalazine administration due to the potential for falsely depressed results.           Fátima Robbins PA-C  5/21/2025  8:07 AM

## 2025-05-21 NOTE — PLAN OF CARE
Problem: PAIN - ADULT  Goal: Verbalizes/displays adequate comfort level or baseline comfort level  Description: Interventions:  - Encourage patient to monitor pain and request assistance  - Assess pain using appropriate pain scale  - Administer analgesics as ordered based on type and severity of pain and evaluate response  - Implement non-pharmacological measures as appropriate and evaluate response  - Consider cultural and social influences on pain and pain management  - Notify physician/advanced practitioner if interventions unsuccessful or patient reports new pain  - Educate patient/family on pain management process including their role and importance of  reporting pain   - Provide non-pharmacologic/complimentary pain relief interventions  Outcome: Progressing     Problem: INFECTION - ADULT  Goal: Absence or prevention of progression during hospitalization  Description: INTERVENTIONS:  - Assess and monitor for signs and symptoms of infection  - Monitor lab/diagnostic results  - Monitor all insertion sites, i.e. indwelling lines, tubes, and drains  - Monitor endotracheal if appropriate and nasal secretions for changes in amount and color  - [place] appropriate cooling/warming therapies per order  - Administer medications as ordered  - Instruct and encourage patient and family to use good hand hygiene technique  - Identify and instruct in appropriate isolation precautions for identified infection/condition  Outcome: Progressing  Goal: Absence of fever/infection during neutropenic period  Description: INTERVENTIONS:  - Monitor WBC  - Perform strict hand hygiene  - Limit to healthy visitors only  - No plants, dried, fresh or silk flowers with galan in patient room  Outcome: Progressing     Problem: SAFETY ADULT  Goal: Patient will remain free of falls  Description: INTERVENTIONS:  - Educate patient/family on patient safety including physical limitations  - Instruct patient to call for assistance with activity   -  Consider consulting OT/PT to assist with strengthening/mobility based on AM PAC & JH-HLM score  - Consult OT/PT to assist with strengthening/mobility   - Keep Call bell within reach  - Keep bed low and locked with side rails adjusted as appropriate  - Keep care items and personal belongings within reach  - Initiate and maintain comfort rounds  - Make Fall Risk Sign visible to staff  - Apply yellow socks and bracelet for high fall risk patients  - Consider moving patient to room near nurses station  Outcome: Progressing  Goal: Maintain or return to baseline ADL function  Description: INTERVENTIONS:  -  Assess patient's ability to carry out ADLs; assess patient's baseline for ADL function and identify physical deficits which impact ability to perform ADLs (bathing, care of mouth/teeth, toileting, grooming, dressing, etc.)  - Assess/evaluate cause of self-care deficits   - Assess range of motion  - Assess patient's mobility; develop plan if impaired  - Assess patient's need for assistive devices and provide as appropriate  - Encourage maximum independence but intervene and supervise when necessary  - Involve family in performance of ADLs  - Assess for home care needs following discharge   - Consider OT consult to assist with ADL evaluation and planning for discharge  - Provide patient education as appropriate  - Monitor functional capacity and physical performance, use of AM PAC & JH-HLM   - Monitor gait, balance and fatigue with ambulation    Outcome: Progressing  Goal: Maintains/Returns to pre admission functional level  Description: INTERVENTIONS:  - Perform AM-PAC 6 Click Basic Mobility/ Daily Activity assessment daily.  - Set and communicate daily mobility goal to care team and patient/family/caregiver.   - Collaborate with rehabilitation services on mobility goals if consulted  - Out of bed for toileting  - Record patient progress and toleration of activity level   Outcome: Progressing     Problem: DISCHARGE  PLANNING  Goal: Discharge to home or other facility with appropriate resources  Description: INTERVENTIONS:  - Identify barriers to discharge w/patient and caregiver  - Arrange for needed discharge resources and transportation as appropriate  - Identify discharge learning needs (meds, wound care, etc.)  - Arrange for interpretive services to assist at discharge as needed  - Refer to Case Management Department for coordinating discharge planning if the patient needs post-hospital services based on physician/advanced practitioner order or complex needs related to functional status, cognitive ability, or social support system  Outcome: Progressing     Problem: Knowledge Deficit  Goal: Patient/family/caregiver demonstrates understanding of disease process, treatment plan, medications, and discharge instructions  Description: Complete learning assessment and assess knowledge base.  Interventions:  - Provide teaching at level of understanding  - Provide teaching via preferred learning methods  Outcome: Progressing     Problem: POSTPARTUM  Goal: Experiences normal postpartum course  Description: INTERVENTIONS:  - Monitor maternal vital signs  - Assess uterine involution and lochia  Outcome: Progressing  Goal: Appropriate maternal -  bonding  Description: INTERVENTIONS:  - Identify family support  - Assess for appropriate maternal/infant bonding   -Encourage maternal/infant bonding opportunities  - Referral to  or  as needed  Outcome: Progressing  Goal: Establishment of infant feeding pattern  Description: INTERVENTIONS:  - Assess breast/bottle feeding  - Refer to lactation as needed  Outcome: Progressing  Goal: Incision(s), wounds(s) or drain site(s) healing without S/S of infection  Description: INTERVENTIONS  - Assess and document dressing, incision, wound bed, drain sites and surrounding tissue  - Provide patient and family education    Outcome: Progressing

## 2025-05-23 ENCOUNTER — TELEPHONE (OUTPATIENT)
Age: 35
End: 2025-05-23

## 2025-05-23 NOTE — TELEPHONE ENCOUNTER
Received call from [facility] to verify information for patient's short term disability claim. Reviewed chart, FMLA information on file for [facility]. Provided requested information.

## 2025-05-25 LAB — PLACENTA IN STORAGE: NORMAL

## 2025-05-30 NOTE — PROGRESS NOTES
Post-Partum Checkup Visit    Sara Cruz is a 35 y.o.  female     Assessment:  Delivered a male  (7lb 7oz) via LTCS with 25 (no lac) on 25 after admission for SROM, doing well today.     Plan:  Infant feeding: continue pumping.  Breast: due to history of systemic yeast, preemptively treating with Diflucan and nystatin ointment. Reviewed to wash breast prior to pumping with nystatin applications.  Urinary: UA/UC collected. Will treat based on results.  Vaginitis: culture collected. Will treat based on results.   Contraception: TBD, plan to review at PP visit.  Activity: restrict until 6 weeks  RTO for scheduled postpartum appt and PRN    Problem List Items Addressed This Visit    None  Visit Diagnoses         Dysuria    -  Primary    Relevant Orders    Urinalysis with microscopic    Urine culture      Yeast infection of the skin        Relevant Medications    fluconazole (DIFLUCAN) 150 mg tablet    nystatin (MYCOSTATIN) ointment      Acute vaginitis        Relevant Orders    Molecular Vaginal Panel              Subjective/Objective     Subjective:   Pain: no  Tolerating Oral Intake: yes  Voiding: yes, reports dysuria. Started last night.   Flatus: yes  Bowel Movement: yes  Ambulating: yes  Breastfeeding: Using breast pump, exclusively. She reports nipple itchiness. History of systemic fungal infection. She wants to be evaluated. Symptoms started last night. Denies cracked nipples. Mild tenderness.   Chest Pain: no  Shortness of Breath: no  Leg Pain/Discomfort: no  Lochia: normal     Objective:     Postpartum Depression: High Risk (2025)    [place]  Depression Scale     Last EPDS Total Score: 10     Last EPDS Self Harm Result: Never       Vitals:  Vitals:    25 1254   BP: 122/74       Physical Exam:  Physical Exam  Vitals and nursing note reviewed.   Constitutional:       Appearance: Normal appearance.   HENT:      Head: Normocephalic.     Eyes:      Conjunctiva/sclera:  Conjunctivae normal.       Cardiovascular:      Rate and Rhythm: Normal rate and regular rhythm.      Heart sounds: Normal heart sounds.   Pulmonary:      Effort: Pulmonary effort is normal.      Breath sounds: Normal breath sounds.   Chest:   Breasts:     Right: Skin change present. No inverted nipple, mass, nipple discharge or tenderness.      Left: Skin change present. No inverted nipple, mass, nipple discharge or tenderness.      Comments: Mild erythema and irritation of bilateral nipples.  Abdominal:      General: Abdomen is flat.      Palpations: Abdomen is soft. There is no mass.      Tenderness: There is no abdominal tenderness. There is no right CVA tenderness or left CVA tenderness.   Genitourinary:     General: Normal vulva.      Exam position: Lithotomy position.      Pubic Area: No rash or pubic lice.       Labia:         Right: No rash or tenderness.         Left: No rash or tenderness.       Urethra: No prolapse or urethral pain.      Vagina: Normal. No vaginal discharge.      Cervix: Normal.      Uterus: Normal.       Adnexa: Right adnexa normal and left adnexa normal.        Right: No mass or tenderness.          Left: No mass or tenderness.        Comments: Minimal amount of brown-red bleeding noted in posterior vault. No evidence of abnormal discharge.     Musculoskeletal:         General: Normal range of motion.      Cervical back: Normal range of motion. No tenderness.      Right lower leg: No edema.      Left lower leg: No edema.   Lymphadenopathy:      Cervical: No cervical adenopathy.      Upper Body:      Right upper body: No supraclavicular or axillary adenopathy.      Left upper body: No supraclavicular or axillary adenopathy.      Lower Body: No right inguinal adenopathy. No left inguinal adenopathy.     Skin:     General: Skin is warm and dry.      Findings: No rash.     Neurological:      Mental Status: She is alert and oriented to person, place, and time.     Psychiatric:         Mood  and Affect: Mood normal.         Behavior: Behavior normal.         Thought Content: Thought content normal.         Judgment: Judgment normal.           OB Hx:  OB History    Para Term  AB Living   2 2 2 0 0 2   SAB IAB Ectopic Multiple Live Births   0 0 0 0 2      # Outcome Date GA Lbr Luis/2nd Weight Sex Type Anes PTL Lv   2 Term 25 38w4d  3385 g (7 lb 7.4 oz) M CS-LTranv Spinal  OFELIA      Name: Esteban Cruz      Apgar1: 8  Apgar5: 9   1 Term 22 40w1d  3730 g (8 lb 3.6 oz) F CS-Unspec EPI N OFELIA      Name: CINDA CRUZ      Apgar1: 9  Apgar5: 9     Medical Hx  Past Medical History[1]  Surgical Hx  Past Surgical History[2]  Family Hx  Family History[3]  Social Hx  Social History     Socioeconomic History    Marital status: Unknown     Spouse name: Not on file    Number of children: Not on file    Years of education: Not on file    Highest education level: Not on file   Occupational History    Not on file   Tobacco Use    Smoking status: Never     Passive exposure: Past    Smokeless tobacco: Never   Vaping Use    Vaping status: Never Used   Substance and Sexual Activity    Alcohol use: Not Currently     Comment: social/ not at the moment due to preg    Drug use: Never     Types: Marijuana     Comment: not since >10 yrs    Sexual activity: Not Currently     Partners: Male     Birth control/protection: None   Other Topics Concern    Not on file   Social History Narrative    Not on file     Social Drivers of Health     Financial Resource Strain: Low Risk  (2021)    Received from [facility]    Overall Financial Resource Strain (CARDIA)     Difficulty of Paying Living Expenses: Not very hard   Food Insecurity: No Food Insecurity (2025)    Nursing - Inadequate Food Risk Classification     Worried About Running Out of Food in the Last Year: Never true     Ran Out of Food in the Last Year: Never true     Ran Out of Food in the Last Year: Never true    Transportation Needs: No Transportation Needs (5/21/2025)    Nursing - Transportation Risk Classification     Lack of Transportation: Not on file     Lack of Transportation: No   Physical Activity: Sufficiently Active (4/8/2021)    Received from [facility]    Exercise Vital Sign     On average, how many days per week do you engage in moderate to strenuous exercise (like a brisk walk)?: 3 days     On average, how many minutes do you engage in exercise at this level?: 60 min   Stress: No Stress Concern Present (8/18/2022)    Received from [facility]    [employer] - Occupational Stress Questionnaire     Feeling of Stress : Only a little   Social Connections: Socially Isolated (4/8/2021)    Received from [facility]    Social Connection and Isolation Panel     In a typical week, how many times do you talk on the phone with family, friends, or neighbors?: Once a week     How often do you get together with friends or relatives?: Once a week     How often do you attend Methodist or Mu-ism services?: Never     Do you belong to any clubs or organizations such as Methodist groups, unions, fraternal or athletic groups, or school groups?: No     How often do you attend meetings of the clubs or organizations you belong to?: Never     Are you , , , , never , or living with a partner?: Living with partner   Intimate Partner Violence: Unknown (5/21/2025)    Nursing IPS     Feels Physically and Emotionally Safe: Not on file     Physically Hurt by Someone: Not on file     Humiliated or Emotionally Abused by Someone: Not on file     Physically Hurt by Someone: No     Hurt or Threatened by Someone: No   Housing Stability: Unknown (5/21/2025)    Nursing: Inadequate Housing Risk Classification     Has Housing: Not on file     Worried About Losing Housing: Not on file     Unable to Get Utilities: Not on file     Unable to Pay for  Housing in the Last Year: No     Has Housin     Allergies  Allergies[4]    OB/GYN  2025  1:30 PM           [1]   Past Medical History:  Diagnosis Date    Anxiety     Depression     hx anxiety/dep - pp dep  Wellbutrin, Fluoxitene, Buspar    Elevated cortisol level 2021    Herpes     cold sores infrequent    Ingrown toenail     Migraine     increased seasonal    Varicella     pt had chiceknpox in childhood   [2]   Past Surgical History:  Procedure Laterality Date     SECTION  2022    OR  DELIVERY ONLY N/A 2025    Procedure:  SECTION () REPEAT;  Surgeon: Zaira Ragland MD;  Location: [facility] ;  Service: Obstetrics    TONSILECTOMY AND ADNOIDECTOMY     [3]   Family History  Problem Relation Name Age of Onset    Diabetes Mother Karina Caballero         type 2    Diabetes type II Mother Karina Caballero     Hypertension Mother Karina Caballero     Heart disease Father Fawad Frias     Coronary artery disease Father Fawad Frias     Heart block Father Fawad Frias     No Known Problems Brother      No Known Problems Brother      No Known Problems Maternal Grandmother      Cancer Maternal Grandfather Satish Baeza         melanoma    Melanoma Maternal Grandfather Satish Baeza     Cataracts Paternal Grandmother      Heart disease Paternal Grandfather      Coronary artery disease Paternal Grandfather     [4]   Allergies  Allergen Reactions    Penicillins Hives

## 2025-06-02 ENCOUNTER — OFFICE VISIT (OUTPATIENT)
Dept: OBGYN CLINIC | Facility: CLINIC | Age: 35
End: 2025-06-02

## 2025-06-02 VITALS
HEIGHT: 63 IN | SYSTOLIC BLOOD PRESSURE: 122 MMHG | BODY MASS INDEX: 30.48 KG/M2 | DIASTOLIC BLOOD PRESSURE: 74 MMHG | WEIGHT: 172 LBS

## 2025-06-02 DIAGNOSIS — B37.2 YEAST INFECTION OF THE SKIN: ICD-10-CM

## 2025-06-02 DIAGNOSIS — N76.0 ACUTE VAGINITIS: ICD-10-CM

## 2025-06-02 DIAGNOSIS — R30.0 DYSURIA: Primary | ICD-10-CM

## 2025-06-02 LAB
BACTERIA UR QL AUTO: ABNORMAL /HPF
BILIRUB UR QL STRIP: NEGATIVE
CLARITY UR: CLEAR
COLOR UR: ABNORMAL
GLUCOSE UR STRIP-MCNC: NEGATIVE MG/DL
HGB UR QL STRIP.AUTO: NEGATIVE
KETONES UR STRIP-MCNC: NEGATIVE MG/DL
LEUKOCYTE ESTERASE UR QL STRIP: NEGATIVE
MUCOUS THREADS UR QL AUTO: ABNORMAL
NITRITE UR QL STRIP: NEGATIVE
NON-SQ EPI CELLS URNS QL MICRO: ABNORMAL /HPF
PH UR STRIP.AUTO: 6 [PH]
PROT UR STRIP-MCNC: NEGATIVE MG/DL
RBC #/AREA URNS AUTO: ABNORMAL /HPF
SP GR UR STRIP.AUTO: 1.01 (ref 1–1.03)
UROBILINOGEN UR STRIP-ACNC: <2 MG/DL
WBC #/AREA URNS AUTO: ABNORMAL /HPF

## 2025-06-02 PROCEDURE — 87086 URINE CULTURE/COLONY COUNT: CPT

## 2025-06-02 PROCEDURE — 81001 URINALYSIS AUTO W/SCOPE: CPT

## 2025-06-02 PROCEDURE — 81514 NFCT DS BV&VAGINITIS DNA ALG: CPT

## 2025-06-02 RX ORDER — NYSTATIN 100000 U/G
OINTMENT TOPICAL 2 TIMES DAILY
Qty: 15 G | Refills: 1 | Status: SHIPPED | OUTPATIENT
Start: 2025-06-02 | End: 2025-06-15 | Stop reason: ALTCHOICE

## 2025-06-02 RX ORDER — FLUCONAZOLE 150 MG/1
150 TABLET ORAL ONCE
Qty: 1 TABLET | Refills: 0 | Status: SHIPPED | OUTPATIENT
Start: 2025-06-02 | End: 2025-06-02

## 2025-06-03 ENCOUNTER — RESULTS FOLLOW-UP (OUTPATIENT)
Dept: OBGYN CLINIC | Facility: CLINIC | Age: 35
End: 2025-06-03

## 2025-06-03 LAB
BACTERIA UR CULT: NORMAL
C GLABRATA DNA VAG QL NAA+PROBE: NEGATIVE
C KRUSEI DNA VAG QL NAA+PROBE: NEGATIVE
CANDIDA SP 6 PNL VAG NAA+PROBE: NEGATIVE
T VAGINALIS DNA VAG QL NAA+PROBE: NEGATIVE
VAGINOSIS/ITIS DNA PNL VAG PROBE+SIG AMP: NEGATIVE

## 2025-06-10 ENCOUNTER — OFFICE VISIT (OUTPATIENT)
Dept: POSTPARTUM | Facility: CLINIC | Age: 35
End: 2025-06-10
Payer: COMMERCIAL

## 2025-06-10 PROCEDURE — 99205 OFFICE O/P NEW HI 60 MIN: CPT | Performed by: PEDIATRICS

## 2025-06-10 NOTE — PATIENT INSTRUCTIONS
Continue to feed on demand, offering the breast as often as you feel comfortable.    Express breast milk whenever Orellana is fed only by bottle and as needed, for comfort.    You may begin to decrease how much you are expressing, specifically dropping one pumping session at night by decreasing how much you express every few nights until you feel comfortable skipping that pumping altogether. Work with Fabiano so that you can both get about 5-6 hours of uninterrupted sleep nightly.    Apply the [employer] nipple balm to your left nipple and cover with a non-stick product such as parchment or wax paper or a non-stick pad similar to Telfa after each breastfeeding or pumping until healed and not tender.    Your flanges should fit so that your nipple moves freely in the tunnel with little to no areola joining it. Use the lowest effective suction.

## 2025-06-10 NOTE — PROGRESS NOTES
Name: Sara Cruz      : 1990      MRN: [id number]  Encounter Provider: Ilene Zhong MD  Encounter Date: 6/10/2025   Encounter department: [facility]      INITIAL BREAST FEEDING EVALUATION  :  Assessment & Plan  Encounter for care or examination of lactating mother       Discussed history and physical exams with Sara. Support given for her commitment to providing breast milk for her baby. Discussed the findings on the baby's exam consistent with tongue tie and reviewed how this may be the cause of nipple trauma, nipple pain, nipple damage, poor milk transfer, blocked ducts, mastitis, and loss of milk production. Discussed the science that supports performing the frenotomy to improve latch.   Recommended applying ointment to the left nipple after breastfeeding or pumping and covering with a non-stick product until the wound is healed and all tenderness resolves. Reviewed how to focus on feeding on demand, but allow for a longer period of sleep for both parents over night.    Discussed over production and hyperlactation. Reviewed how to begin slowly decreasing milk production so that Sara can continue to breastfeed or express milk to feed Esteban but not with the main focus to keep her breasts comfortable.       History of Present Illness       Informant/Relationship: Sara and Alexandro/mom and dad    Discussion of General Lactation Issues: Sara and Esteban struggled with attachment early. The LC and pediatrician in the hospital told her he was tongue tied. Bottles were started after discharge. Sara is currently exclusively pumping and giving bottles of her milk. Attachment was 7/10 and the rest of the feeding was not always better.    Sara has a 2.5 year old daughter who was also tongue tied. She had a frenotomy done by laser at about 1 month of age but remained uncoordinated. Sara did then express and bottle feed for a year with enough milk to provide her milk for the  baby for 18 months.    Esteban makes some kissing noises at the bottle and loses milk at the bottle. This is sometimes from the bottle leaking.     Infant is 22 days old today.         History:  Fertility Problem:no  Breast changes:yes - tender, got bigger  : yes - repeat; scheduled  Full term:yes - 38.4   labor:no  First nursing/attempt < 1 hour after birth:yes - in PACU  Skin to skin following delivery:yes - in PACU  Breast changes after delivery:yes - by around day 2; color changes  Rooming in (infant in room with mother with exception of procedures, eg. Circumcision: yes - left only for circumcision  Blood sugar issues:no  NICU stay:no  Jaundice:no  Phototherapy:no  Supplement given: (list supplement and method used as well as reason(s):yes - MOM/colostrum via syrnge    Past Medical History[1]    Current Medications[2]    Allergies   Allergen Reactions    Penicillins Hives       Social History     Substance and Sexual Activity   Drug Use Never    Types: Marijuana    Comment: not since >10 yrs       Social History     Interval Breastfeeding History:    Frequency of breast feeding: none; last offered a couple of days ago  Does mother feel breastfeeding is effective: If no, explain: painful, but he developed frustration  Does infant appear satisfied after nursing:If no, explain: feeding frequently  Stooling pattern normal: Yes  Urinating frequently:Yes  Using shield or shells: no    Alternative/Artificial Feedings:   Bottle: Yes, Dr. Brown's, Severiano See Natural size 1; paced bottle feeding  Cup: No  Syringe/Finger: No           Formula Type: n/a                     Amount: n/a            Breast Milk:                      Amount: 4 oz            Frequency Q 3 Hr between feedings  Elimination Problems: No      Equipment:  Nipple Shield             Type: n/a             Size: n/a             Frequency of Use: n/a  Pump            Type: Unimom Opera (primary); MomCozy M5; using a 17  mm insert on the left and 19 mm insert on the right; maximum vacuum 3            Frequency of Use: about ever 3 hours around the clock; stores additional 24 oz/day  Shells            Type: n/a            Frequency of use: n/a    Equipment Problems:no      Mom:  Breast: Full, round, pendulous  Nipple Assessment in General: Normal: elongated/eraser, no discoloration and no damage noted on the right; small, 2 x 3 mm lesion just off center on the face of the left nipple  Mother's Awareness of Feeding Cues                 Recognizes: Yes                  Verbalizes: Yes  Support System: FOB, extended parents  History of Breastfeeding: Sara expressed breast milk for her older child for 12 months and had breast milk for her for 18 months  Changes/Stressors/Violence: Sara is very stressed that Esteban is not latching  Concerns/Goals: Sara wants to ensure that Esteban is well fed, but really wishes to feed at the breast    Problems with Mom: Breastfeeding pain    Objective   LMP 08/22/2024 (Exact Date)        Physical Exam  Constitutional:       Appearance: Normal appearance. She is well-developed and normal weight.   HENT:      Head: Normocephalic and atraumatic.     Eyes:      Extraocular Movements: Extraocular movements intact.     Neck:      Thyroid: No thyromegaly.     Cardiovascular:      Rate and Rhythm: Normal rate and regular rhythm.      Pulses: Normal pulses.      Heart sounds: Normal heart sounds. No murmur heard.  Pulmonary:      Effort: Pulmonary effort is normal.      Breath sounds: Normal breath sounds.     Musculoskeletal:         General: No swelling or tenderness. Normal range of motion.      Cervical back: Normal range of motion and neck supple.      Right lower leg: No edema.      Left lower leg: No edema.   Lymphadenopathy:      Cervical: No cervical adenopathy.      Upper Body:      Right upper body: No pectoral adenopathy.      Left upper body: No pectoral adenopathy.     Neurological:       General: No focal deficit present.      Mental Status: She is alert and oriented to person, place, and time.     Psychiatric:         Mood and Affect: Mood normal.         Behavior: Behavior normal.         Thought Content: Thought content normal.         Judgment: Judgment normal.   Vitals and nursing note reviewed.             Infant:  Behaviors: Alert  Color: Healthy  Birth weight: 3.385 kg  Current weight: 4.14 kg    Problems with infant: Restricted tongue tie      General Appearance:  Alert, active, no distress                             Head:  Normocephalic, AFOF, sutures opposed                             Eyes:  Conjunctiva clear, no drainage                              Ears:  Normally placed, no anomolies                             Nose:  Septum intact, no drainage or erythema                           Mouth:  No lesions; tongue extends over the mandibular gum ridge but not to the lower lip, lateralizes minimally to the right and less to the left with slight rolling of the contralateral edge; the tongue lifts only 40% and the frenulum is visible with this lift; there is very little cupping of the examiner's finger, no peristalsis noted; and the seal is lost with any traction placed on the mandible; the frenulum is thin, short, inelastic, and extends from the tip of the tongue to the crest of the mandibular gum ridge                    Neck:  Supple, symmetrical, trachea midline, no adenopathy; thyroid: no enlargement, symmetric, no tenderness/mass/nodules                 Respiratory:  No grunting, flaring, retractions, breath sounds clear and equal            Cardiovascular:  Regular rate and rhythm. No murmur. Adequate perfusion/capillary refill. Femoral pulse present                    Abdomen:   Soft, non-tender, no masses, bowel sounds present, no HSM             Genitourinary:  Normal male, testes descended, no discharge, swelling, or pain, anus patent                          Spine:   No  abnormalities noted        Musculoskeletal:  Full range of motion          Skin/Hair/Nails:   Skin warm, dry, and intact, no rashes or abnormal dyspigmentation or lesions                Neurologic:   No abnormal movement, tone appropriate for gestational age    [place] Latch:  Efficiency:               Lips Flanged: Yes, after frenotomy              Depth of latch: Very good, after the frenotomy              Audible Swallow: Yes, sustained SSB after frenotomy              Visible Milk: Yes, after frenotomy              Wide Open/ Asymmetrical: Yes, after frenotomy              Suck Swallow Cycle: Breathing: Unlabored, Coordinated: Yes  Nipple Assessment after latch: Normal: elongated/eraser, no discoloration and no damage noted on the right; small, 2 x 3 mm lesion just off center on the face of the left nipple  Latch Problems: Initially after the frenotomy, Esteban remains upset doesn't attach well so is offered the bottle briefly where he demonstrates a widely flanged latch and some good milk transfer. He is then offered the breast where he then attains a wide, deep, asymmetrical, and fully comfortable attachment on the right and improved comfort attachment on the left. He demonstrates sustained SSB on both breasts and feeds until fully content.     Position:  Infant's Ergonomics/Body               Body Alignment: Yes               Head Supported: Yes               Close to Mom's body/ Lifted/ Supported: Yes               Mom's Ergonomics/Body:Yes                           Supported: Yes                           Sitting Back: Yes                           Brings Baby to her breast: Yes  Positioning Problems: None        Education:  Reviewed Latch: Reviewed how to gently compress the breast as if offering a sandwich to facilitate a deeper latch.    Reviewed Positioning for Dyad: Reviewed how to bring baby to the breast so that his lower lip and chin touch the breast with his nose just above the nipple to encourage  a wider, more asymmetric latch.   Reviewed Frequency/Supply & Demand: Recommended feeding on demand: when the baby gives hunger cues, when the breasts feel full, every 3 hours during the day and every 5 hours at night counting from the beginning of one feeding to the beginning of the next; whichever comes first.    Reviewed Alternative/Artificial Feedings: Paced bottle feeding  Reviewed Mom/Breast care: Apply ointment to the left nipple after breastfeeding or pumping and cover with a non-stick pad to facilitate healing of the damage; express breast milk whenever Esteban is fed only by bottle  Reviewed Equipment: Reviewed how to determine the best flange size and pump settings for most comfortable and effective milk removal.    Administrative Statements   I have spent a total time of 60 minutes in caring for this patient on the day of the visit/encounter including Prognosis, Risks and benefits of tx options, Instructions for management, Patient and family education, Importance of tx compliance, Risk factor reductions, Impressions, Counseling / Coordination of care, Documenting in the medical record, Reviewing/placing orders in the medical record (including tests, medications, and/or procedures), and Obtaining or reviewing history  .             [1]   Past Medical History:  Diagnosis Date    Anxiety     Depression     hx anxiety/dep - pp dep 2022 Wellbutrin, Fluoxitene, Buspar    Elevated cortisol level 04/08/2021    Herpes     cold sores infrequent    Ingrown toenail     Migraine     increased seasonal    Varicella     pt had chiceknpox in childhood   [2]   Current Outpatient Medications:     buPROPion (WELLBUTRIN XL) 150 mg 24 hr tablet, Take 1 tablet by mouth in the morning, Disp: , Rfl:     calcium carbonate (TUMS) 500 mg chewable tablet, Chew 2 tablets (1,000 mg total) 3 (three) times a day as needed for indigestion or heartburn, Disp: 60 tablet, Rfl: 0    docusate sodium (COLACE) 100 mg capsule, Take 100 mg by  mouth in the morning., Disp: , Rfl:     doxylamine (UNISOM) 25 MG tablet, Take 0.5 tablets (12.5 mg total) by mouth daily at bedtime as needed for sleep (Patient not taking: Reported on 6/13/2025), Disp: 30 tablet, Rfl: 0    famotidine (PEPCID) 20 mg tablet, TAKE 1 TABLET BY MOUTH TWICE A DAY (Patient not taking: Reported on 6/13/2025), Disp: 180 tablet, Rfl: 1    FLUoxetine (PROzac) 40 MG capsule, Take 40 mg by mouth in the morning., Disp: , Rfl:     ibuprofen (MOTRIN) 600 mg tablet, Take 1 tablet (600 mg total) by mouth every 6 (six) hours as needed for mild pain (Patient not taking: Reported on 6/13/2025), Disp: 30 tablet, Rfl: 0    nystatin (MYCOSTATIN) ointment, Apply topically 2 (two) times a day (Patient not taking: Reported on 6/13/2025), Disp: 15 g, Rfl: 1    polyethylene glycol (MIRALAX) 17 g packet, Take 17 g by mouth daily (Patient not taking: Reported on 6/13/2025), Disp: 30 each, Rfl: 0    Prenatal MV-Min-Fe Fum-FA-DHA (PRENATAL 1 PO), , Disp: , Rfl:     senna-docusate sodium (SENOKOT S) 8.6-50 mg per tablet, Take 1 tablet by mouth daily at bedtime (Patient not taking: Reported on 6/13/2025), Disp: 30 tablet, Rfl: 0    simethicone (MYLICON) 80 mg chewable tablet, Chew 1 tablet (80 mg total) every 6 (six) hours as needed for flatulence (Patient not taking: Reported on 6/13/2025), Disp: 30 tablet, Rfl: 0

## 2025-06-13 ENCOUNTER — OFFICE VISIT (OUTPATIENT)
Dept: FAMILY MEDICINE CLINIC | Facility: CLINIC | Age: 35
End: 2025-06-13
Payer: COMMERCIAL

## 2025-06-13 VITALS
WEIGHT: 170 LBS | HEART RATE: 74 BPM | TEMPERATURE: 97.7 F | BODY MASS INDEX: 30.12 KG/M2 | SYSTOLIC BLOOD PRESSURE: 108 MMHG | DIASTOLIC BLOOD PRESSURE: 74 MMHG | OXYGEN SATURATION: 98 % | HEIGHT: 63 IN

## 2025-06-13 DIAGNOSIS — Z00.00 ANNUAL PHYSICAL EXAM: Primary | ICD-10-CM

## 2025-06-13 PROBLEM — Z87.891 HISTORY OF TOBACCO USE: Status: RESOLVED | Noted: 2024-11-06 | Resolved: 2025-06-13

## 2025-06-13 PROBLEM — R53.83 FATIGUE: Status: RESOLVED | Noted: 2021-04-08 | Resolved: 2025-06-13

## 2025-06-13 PROCEDURE — 99395 PREV VISIT EST AGE 18-39: CPT | Performed by: NURSE PRACTITIONER

## 2025-06-13 NOTE — PROGRESS NOTES
Adult Annual Physical  Name: Sara Cruz      : 1990      MRN: [id number]  Encounter Provider: KOURTNEY Ortega  Encounter Date: 2025   Encounter department: [facility]UNIOR    :  Assessment & Plan  Annual physical exam             Preventive Screenings:    - Cervical cancer screening: screening up-to-date     Immunizations:  - Immunizations due: Prevnar 20         History of Present Illness     Adult Annual Physical:  Patient presents for annual physical. Patient here for a comprehensive physical exam. The patient reports currently continues on antidepressants with mood stable at this time.  Patient recently gave birth on 2025 and is breast-feeding.  Patient reports that she is doing well since delivery and breast-feeding is also progressing.    .     Diet and Physical Activity:  - Diet/Nutrition: no special diet.  - Exercise: no formal exercise.    Depression Screening:  - PHQ-2 Score: 2    General Health:  - Sleep: sleeps well and 4-6 hours of sleep on average.  - Hearing: normal hearing bilateral ears.  - Vision: wears glasses and no vision problems.  - Dental: regular dental visits and brushes teeth twice daily.    /GYN Health:  - Follows with GYN: yes.   - Menopause: premenopausal.   - History of STDs: no    Advanced Care Planning:  - Has an advanced directive?: no    - Has a durable medical POA?: no    - ACP document given to patient?: no      Review of Systems   Constitutional:  Negative for activity change, appetite change and unexpected weight change.   HENT:  Negative for dental problem, ear pain, hearing loss, nosebleeds, sneezing, sore throat, tinnitus and trouble swallowing.    Eyes:  Negative for visual disturbance.   Respiratory:  Negative for cough, chest tightness, shortness of breath and wheezing.    Cardiovascular:  Negative for chest pain, palpitations and leg swelling.   Gastrointestinal:  Negative for abdominal distention, abdominal pain,  "constipation, diarrhea and nausea.   Endocrine: Negative for polydipsia, polyphagia and polyuria.   Genitourinary: Negative.    Musculoskeletal:  Negative for arthralgias, back pain, myalgias and neck pain.   Skin:  Negative for color change and rash.   Allergic/Immunologic: Negative for environmental allergies.   Neurological:  Negative for dizziness, weakness, light-headedness and headaches.   Hematological: Negative.    Psychiatric/Behavioral: Negative.  Negative for dysphoric mood and sleep disturbance. The patient is not nervous/anxious.      Medications Ordered Prior to Encounter[1]     Objective   /74 (BP Location: Left arm, Patient Position: Sitting, Cuff Size: Large)   Pulse 74   Temp 97.7 °F (36.5 °C) (Temporal)   Ht 5' 3\" (1.6 m)   Wt 77.1 kg (170 lb)   LMP 08/22/2024 (Exact Date)   SpO2 98%   Breastfeeding Yes   BMI 30.11 kg/m² (Reviewed)    Physical Exam  Vitals reviewed.   Constitutional:       General: She is not in acute distress.     Appearance: Normal appearance. She is well-developed and well-groomed. She is not ill-appearing.   HENT:      Head: Normocephalic and atraumatic.      Right Ear: External ear normal.      Left Ear: External ear normal.      Nose: Nose normal.      Mouth/Throat:      Lips: Pink.      Mouth: Mucous membranes are moist.      Pharynx: Oropharynx is clear.     Eyes:      General: Lids are normal.      Extraocular Movements: Extraocular movements intact.      Conjunctiva/sclera: Conjunctivae normal.      Pupils: Pupils are equal, round, and reactive to light.     Neck:      Thyroid: No thyromegaly or thyroid tenderness.      Trachea: Trachea and phonation normal.     Cardiovascular:      Rate and Rhythm: Normal rate and regular rhythm.      Pulses:           Radial pulses are 2+ on the right side and 2+ on the left side.        Dorsalis pedis pulses are 2+ on the right side and 2+ on the left side.        Posterior tibial pulses are 2+ on the right side and 2+ " on the left side.      Heart sounds: Normal heart sounds. No murmur heard.  Pulmonary:      Effort: Pulmonary effort is normal.      Breath sounds: Normal breath sounds.   Abdominal:      General: Abdomen is flat. Bowel sounds are normal. There is no distension.      Palpations: Abdomen is soft.      Tenderness: There is no abdominal tenderness.     Musculoskeletal:         General: Normal range of motion.      Cervical back: Neck supple.      Right lower leg: No edema.      Left lower leg: No edema.     Skin:     General: Skin is warm and dry.      Capillary Refill: Capillary refill takes less than 2 seconds.     Neurological:      General: No focal deficit present.      Mental Status: She is alert and oriented to person, place, and time.      Cranial Nerves: Cranial nerves 2-12 are intact.     Psychiatric:         Mood and Affect: Mood normal.         Behavior: Behavior normal. Behavior is cooperative.                [1]  Current Outpatient Medications on File Prior to Visit   Medication Sig Dispense Refill   • buPROPion (WELLBUTRIN XL) 150 mg 24 hr tablet Take 1 tablet by mouth in the morning     • calcium carbonate (TUMS) 500 mg chewable tablet Chew 2 tablets (1,000 mg total) 3 (three) times a day as needed for indigestion or heartburn 60 tablet 0   • docusate sodium (COLACE) 100 mg capsule Take 100 mg by mouth in the morning.     • FLUoxetine (PROzac) 40 MG capsule Take 40 mg by mouth in the morning.     • Prenatal MV-Min-Fe Fum-FA-DHA (PRENATAL 1 PO)      • [DISCONTINUED] doxylamine (UNISOM) 25 MG tablet Take 0.5 tablets (12.5 mg total) by mouth daily at bedtime as needed for sleep (Patient not taking: Reported on 6/13/2025) 30 tablet 0   • [DISCONTINUED] famotidine (PEPCID) 20 mg tablet TAKE 1 TABLET BY MOUTH TWICE A DAY (Patient not taking: Reported on 6/13/2025) 180 tablet 1   • [DISCONTINUED] ibuprofen (MOTRIN) 600 mg tablet Take 1 tablet (600 mg total) by mouth every 6 (six) hours as needed for mild pain  (Patient not taking: Reported on 6/13/2025) 30 tablet 0   • [DISCONTINUED] nystatin (MYCOSTATIN) ointment Apply topically 2 (two) times a day (Patient not taking: Reported on 6/13/2025) 15 g 1   • [DISCONTINUED] polyethylene glycol (MIRALAX) 17 g packet Take 17 g by mouth daily (Patient not taking: Reported on 6/13/2025) 30 each 0   • [DISCONTINUED] senna-docusate sodium (SENOKOT S) 8.6-50 mg per tablet Take 1 tablet by mouth daily at bedtime (Patient not taking: Reported on 6/13/2025) 30 tablet 0   • [DISCONTINUED] simethicone (MYLICON) 80 mg chewable tablet Chew 1 tablet (80 mg total) every 6 (six) hours as needed for flatulence (Patient not taking: Reported on 6/13/2025) 30 tablet 0     No current facility-administered medications on file prior to visit.

## 2025-06-13 NOTE — PATIENT INSTRUCTIONS
"Patient Education     Routine physical for adults   The Basics   Written by the doctors and editors at [facility]   What is a physical? -- A physical is a routine visit, or \"check-up,\" with your doctor. You might also hear it called a \"wellness visit\" or \"preventive visit.\"  During each visit, the doctor will:   Ask about your physical and mental health   Ask about your habits, behaviors, and lifestyle   Do an exam   Give you vaccines if needed   Talk to you about any medicines you take   Give advice about your health   Answer your questions  Getting regular check-ups is an important part of taking care of your health. It can help your doctor find and treat any problems you have. But it's also important for preventing health problems.  A routine physical is different from a \"sick visit.\" A sick visit is when you see a doctor because of a health concern or problem. Since physicals are scheduled ahead of time, you can think about what you want to ask the doctor.  How often should I get a physical? -- It depends on your age and health. In general, for people age 21 years and older:   If you are younger than 50 years, you might be able to get a physical every 3 years.   If you are 50 years or older, your doctor might recommend a physical every year.  If you have an ongoing health condition, like diabetes or high blood pressure, your doctor will probably want to see you more often.  What happens during a physical? -- In general, each visit will include:   Physical exam - The doctor or nurse will check your height, weight, heart rate, and blood pressure. They will also look at your eyes and ears. They will ask about how you are feeling and whether you have any symptoms that bother you.   Medicines - It's a good idea to bring a list of all the medicines you take to each doctor visit. Your doctor will talk to you about your medicines and answer any questions. Tell them if you are having any side effects that bother you. You " "should also tell them if you are having trouble paying for any of your medicines.   Habits and behaviors - This includes:   Your diet   Your exercise habits   Whether you smoke, drink alcohol, or use drugs   Whether you are sexually active   Whether you feel safe at home  Your doctor will talk to you about things you can do to improve your health and lower your risk of health problems. They will also offer help and support. For example, if you want to quit smoking, they can give you advice and might prescribe medicines. If you want to improve your diet or get more physical activity, they can help you with this, too.   Lab tests, if needed - The tests you get will depend on your age and situation. For example, your doctor might want to check your:   Cholesterol   Blood sugar   Iron level   Vaccines - The recommended vaccines will depend on your age, health, and what vaccines you already had. Vaccines are very important because they can prevent certain serious or deadly infections.   Discussion of screening - \"Screening\" means checking for diseases or other health problems before they cause symptoms. Your doctor can recommend screening based on your age, risk, and preferences. This might include tests to check for:   Cancer, such as breast, prostate, cervical, ovarian, colorectal, prostate, lung, or skin cancer   Sexually transmitted infections, such as chlamydia and gonorrhea   Mental health conditions like depression and anxiety  Your doctor will talk to you about the different types of screening tests. They can help you decide which screenings to have. They can also explain what the results might mean.   Answering questions - The physical is a good time to ask the doctor or nurse questions about your health. If needed, they can refer you to other doctors or specialists, too.  Adults older than 65 years often need other care, too. As you get older, your doctor will talk to you about:   How to prevent falling at " home   Hearing or vision tests   Memory testing   How to take your medicines safely   Making sure that you have the help and support you need at home  All topics are updated as new evidence becomes available and our peer review process is complete.  This topic retrieved from [employer] on: May 02, 2024.  Topic 294314 Version 1.0  Release: 32.4.3 - C32.122  © 2024 UpToDate, Inc. and/or its affiliates. All rights reserved.  Consumer Information Use and Disclaimer   Disclaimer: This generalized information is a limited summary of diagnosis, treatment, and/or medication information. It is not meant to be comprehensive and should be used as a tool to help the user understand and/or assess potential diagnostic and treatment options. It does NOT include all information about conditions, treatments, medications, side effects, or risks that may apply to a specific patient. It is not intended to be medical advice or a substitute for the medical advice, diagnosis, or treatment of a health care provider based on the health care provider's examination and assessment of a patient's specific and unique circumstances. Patients must speak with a health care provider for complete information about their health, medical questions, and treatment options, including any risks or benefits regarding use of medications. This information does not endorse any treatments or medications as safe, effective, or approved for treating a specific patient. UpToDate, Inc. and its affiliates disclaim any warranty or liability relating to this information or the use thereof.The use of this information is governed by the Terms of Use, available at https://www.wolterskapturemuwer.com/en/know/clinical-effectiveness-terms. 2024© UpToDate, Inc. and its affiliates and/or licensors. All rights reserved.  Copyright   © 2024 UpToDate, Inc. and/or its affiliates. All rights reserved.

## 2025-06-16 NOTE — PROGRESS NOTES
Post-Partum Checkup Visit    Sara Cruz is a 35 y.o.  female     Assessment:  Delivered a male  (7lb 7oz) via LTCS with 25 (no lac) on 25 after admission for SROM, doing well today.     Plan:  Infant feeding: continue pumping.  EPDS: 10. She is taking Wellbutrin and Prozac. Continue to work with psychiatrist.   Contraception: POP to Nuvaring  Activity: restrict until 6 weeks  Desires Pelvic PT. Referral placed  RTO for 3 month APE    Problem List Items Addressed This Visit       Status post repeat low transverse  section - Primary    Relevant Medications    norethindrone (Valerie) 0.35 MG tablet    Other Relevant Orders    Ambulatory Referral to Physical Therapy           Subjective/Objective     Subjective:   Pain: no  Tolerating Oral Intake: yes  Voiding: yes  Flatus: yes  Bowel Movement: yes  Ambulating: yes  Breastfeeding: Using breast pump, exclusively.   Chest Pain: no  Shortness of Breath: no  Leg Pain/Discomfort: no  Lochia: occasional spotting  EPDS: 10. She feels that she is at baseline and well controlled. She meets with her psychiatrist this coming week. She had good family support and feels that her PP course is better this delivery.     Objective:     Postpartum Depression: High Risk (2025)    [place]  Depression Scale     Last EPDS Total Score: 10     Last EPDS Self Harm Result: Never       Vitals:  Vitals:    25 1624   BP: 106/70         Physical Exam:  Physical Exam  Vitals and nursing note reviewed.   Constitutional:       General: She is not in acute distress.     Appearance: Normal appearance.   HENT:      Head: Normocephalic.     Eyes:      Conjunctiva/sclera: Conjunctivae normal.       Cardiovascular:      Rate and Rhythm: Normal rate and regular rhythm.      Heart sounds: Normal heart sounds.   Pulmonary:      Effort: Pulmonary effort is normal. No respiratory distress.      Breath sounds: Normal breath sounds.   Abdominal:       General: Abdomen is flat.      Palpations: Abdomen is soft.      Tenderness: There is no right CVA tenderness or left CVA tenderness.      Comments: LTCS clean dry and intact. No areas of hardness.      Musculoskeletal:         General: Normal range of motion.      Cervical back: Normal range of motion.      Right lower leg: No edema.      Left lower leg: No edema.   Lymphadenopathy:      Cervical: No cervical adenopathy.     Skin:     General: Skin is warm and dry.     Neurological:      Mental Status: She is alert and oriented to person, place, and time.     Psychiatric:         Mood and Affect: Mood normal.         Behavior: Behavior normal.         Thought Content: Thought content normal.         Judgment: Judgment normal.           OB Hx:  OB History    Para Term  AB Living   2 2 2 0 0 2   SAB IAB Ectopic Multiple Live Births   0 0 0 0 2      # Outcome Date GA Lbr Luis/2nd Weight Sex Type Anes PTL Lv   2 Term 25 38w4d  3385 g (7 lb 7.4 oz) M CS-LTranv Spinal  OFELIA      Name: Esteban Cruz      Apgar1: 8  Apgar5: 9   1 Term 22 40w1d  3730 g (8 lb 3.6 oz) F CS-Unspec EPI N OFELIA      Name: CINDA CRUZ      Apgar1: 9  Apgar5: 9     Medical Hx  Past Medical History[1]  Surgical Hx  Past Surgical History[2]  Family Hx  Family History[3]  Social Hx  Social History     Socioeconomic History    Marital status: Unknown     Spouse name: Not on file    Number of children: Not on file    Years of education: Not on file    Highest education level: Not on file   Occupational History    Not on file   Tobacco Use    Smoking status: Never     Passive exposure: Past    Smokeless tobacco: Never   Vaping Use    Vaping status: Never Used   Substance and Sexual Activity    Alcohol use: Yes     Comment: social/ not at the moment due to preg    Drug use: Never     Types: Marijuana     Comment: not since >10 yrs    Sexual activity: Not Currently     Partners: Male     Birth control/protection: None    Other Topics Concern    Not on file   Social History Narrative    Not on file     Social Drivers of Health     Financial Resource Strain: Low Risk  (4/8/2021)    Received from [facility]    Overall Financial Resource Strain (CARDIA)     Difficulty of Paying Living Expenses: Not very hard   Food Insecurity: No Food Insecurity (6/13/2025)    Nursing - Inadequate Food Risk Classification     Worried About Running Out of Food in the Last Year: Never true     Ran Out of Food in the Last Year: Never true     Ran Out of Food in the Last Year: Never true   Transportation Needs: No Transportation Needs (6/13/2025)    PRAPARE - Transportation     Lack of Transportation (Medical): No     Lack of Transportation (Non-Medical): No   Physical Activity: Sufficiently Active (4/8/2021)    Received from [facility]    Exercise Vital Sign     On average, how many days per week do you engage in moderate to strenuous exercise (like a brisk walk)?: 3 days     On average, how many minutes do you engage in exercise at this level?: 60 min   Stress: No Stress Concern Present (8/18/2022)    Received from [facility]    [employer] - Occupational Stress Questionnaire     Feeling of Stress : Only a little   Social Connections: Socially Isolated (4/8/2021)    Received from [facility]    Social Connection and Isolation Panel     In a typical week, how many times do you talk on the phone with family, friends, or neighbors?: Once a week     How often do you get together with friends or relatives?: Once a week     How often do you attend Latter-day or Mandaen services?: Never     Do you belong to any clubs or organizations such as Latter-day groups, unions, fraternal or athletic groups, or school groups?: No     How often do you attend meetings of the clubs or organizations you belong to?: Never     Are you , , , , never , or living  with a partner?: Living with partner   Intimate Partner Violence: Unknown (2025)    Nursing IPS     Feels Physically and Emotionally Safe: Not on file     Physically Hurt by Someone: Not on file     Humiliated or Emotionally Abused by Someone: Not on file     Physically Hurt by Someone: No     Hurt or Threatened by Someone: No   Housing Stability: Low Risk  (2025)    Housing Stability Vital Sign     Unable to Pay for Housing in the Last Year: No     Number of Times Moved in the Last Year: 0     Homeless in the Last Year: No     Allergies  Allergies[4]    OB/GYN  2025  4:39 PM           [1]   Past Medical History:  Diagnosis Date    Anxiety     Depression     hx anxiety/dep - pp dep  Wellbutrin, Fluoxitene, Buspar    Elevated cortisol level 2021    Herpes     cold sores infrequent    Ingrown toenail     Migraine     increased seasonal    Varicella     pt had chiceknpox in childhood   [2]   Past Surgical History:  Procedure Laterality Date     SECTION  2022    [facility]  DELIVERY ONLY N/A 2025    Procedure:  SECTION () REPEAT;  Surgeon: Zaira Ragland MD;  Location: [facility] ;  Service: Obstetrics    TONSILECTOMY AND ADNOIDECTOMY     [3]   Family History  Problem Relation Name Age of Onset    Diabetes Mother Karina Caballero         type 2    Diabetes type II Mother Karina Caballero     Hypertension Mother Karina Caballero     Heart disease Father Fawad Frias     Coronary artery disease Father Fawad Frias     Heart block Father Fawad Frias     No Known Problems Brother      No Known Problems Brother      No Known Problems Maternal Grandmother      Cancer Maternal Grandfather Satish Baeza         melanoma    Melanoma Maternal Grandfather Satish Baeza     Cataracts Paternal Grandmother      Heart disease Paternal Grandfather      Coronary artery disease Paternal Grandfather     [4]   Allergies  Allergen Reactions    Penicillins Hives

## 2025-06-17 ENCOUNTER — POSTPARTUM VISIT (OUTPATIENT)
Dept: OBGYN CLINIC | Facility: CLINIC | Age: 35
End: 2025-06-17

## 2025-06-17 VITALS
SYSTOLIC BLOOD PRESSURE: 106 MMHG | DIASTOLIC BLOOD PRESSURE: 70 MMHG | WEIGHT: 168 LBS | BODY MASS INDEX: 29.77 KG/M2 | HEIGHT: 63 IN

## 2025-06-17 DIAGNOSIS — Z98.891 STATUS POST REPEAT LOW TRANSVERSE CESAREAN SECTION: Primary | ICD-10-CM

## 2025-06-17 PROCEDURE — 99024 POSTOP FOLLOW-UP VISIT: CPT

## 2025-06-17 RX ORDER — ACETAMINOPHEN AND CODEINE PHOSPHATE 120; 12 MG/5ML; MG/5ML
1 SOLUTION ORAL DAILY
Qty: 84 TABLET | Refills: 0 | Status: SHIPPED | OUTPATIENT
Start: 2025-06-17

## 2025-06-23 ENCOUNTER — OFFICE VISIT (OUTPATIENT)
Dept: POSTPARTUM | Facility: CLINIC | Age: 35
End: 2025-06-23
Payer: COMMERCIAL

## 2025-06-23 PROCEDURE — 99404 PREV MED CNSL INDIV APPRX 60: CPT | Performed by: PEDIATRICS

## 2025-06-23 NOTE — PROGRESS NOTES
BREAST FEEDING FOLLOW UP VISIT    Informant/Relationship: Sara    Discussion of General Lactation Issues: Sara and Esteban are here for follow up after Esteban's frenotomy. Sara feels that he is able to latch more effectively now but he still struggles to latch on the left breast.  She is still primarily pumping and bottle feeding.    Infant is 4 weeks old today.    Interval Breastfeeding History:  Frequency of breast feeding: Attempting 1-2 times a day.  Does mother feel breastfeeding is effective: If no, explain: he pops off and seems frustrated as he nurses.  Does infant appear satisfied after nursing:If no, explain: not typically  Stooling pattern normal:Yes  Urinating frequently:Yes  Using shield or shells:No    Alternative/Artificial Feedings:   Bottle: Yes, Esteban is primarily bottle fed.  Using Evenflo bottles with paced feeding technique.  Cup: No  Syringe/Finger: No           Formula Type: none                     Amount: n/a            Breast Milk:                      Amount: 3.5-4 ounces            Frequency Q 3 Hr between feedings  Elimination Problems: No      Equipment:  Nipple Shield             Type: none             Size: n/a             Frequency of Use: n/a  Pump            Type: Unimom Opera (primary), Mom Cozy M5            Frequency of Use: With every feeding.  Expresses about 8-12 ounces each time she pumps.  This is a surplus of about 24 ounces every day.  Shells            Type: none            Frequency of use: n/a    Equipment Problems: no      Mom:  Breast: Large symmetrical breasts.  Rounded shape and pendulous.  Nipple Assessment in General: Normal: elongated/eraser, no discoloration and no damage noted.  Mother's Awareness of Feeding Cues                 Recognizes: Yes                  Verbalizes: Yes  Support System: FOB, extended family  History of Breastfeeding: Exclusively pumped for her older child who struggled to breastfeef effectively due to tongue tie.  Pump for  12 months and was able to provide milk for 18 months.  Changes/Stressors/Violence: Sara feels that breastfeeding is improving but Esteban continues to struggle to latch on the left breast.  Concerns/Goals: Sara would like to breastfeed more and pump less    Problems with Mom: over production    Physical Exam  Constitutional:       Appearance: Normal appearance.   HENT:      Head: Normocephalic and atraumatic.      Nose: Nose normal.   Pulmonary:      Effort: Pulmonary effort is normal.     Musculoskeletal:         General: Normal range of motion.      Cervical back: Normal range of motion and neck supple.     Neurological:      Mental Status: She is alert and oriented to person, place, and time.     Skin:     General: Skin is warm and dry.     Psychiatric:         Mood and Affect: Mood normal.         Behavior: Behavior normal.         Thought Content: Thought content normal.         Judgment: Judgment normal.         Infant:  Behaviors: Alert  Color: Normal  Birth weight: 3385 grams  Current weight: 4620 grams     Problems with infant: s/p frenotomy, continues to struggle to latch on the left breast      General Appearance:  Alert, active, no distress                             Head:  Normocephalic, AFOF, sutures opposed                             Eyes:  Conjunctiva clear, no drainage                              Ears:  Normally placed, no anomolies                             Nose:  No drainage or erythema                           Mouth:  No lesions. High narrow palate. The tongue extends to the lower lip, lateralizes well bilaterally and the tip elevates to near the palate without restriction. Some effective cupping and peristalsis felt as he sucked on my finger.  Occasionally, Angela bit down on my finger as he sucked.  The frenotomy wound has completely healed.                    Neck:  Supple, symmetrical, trachea midline                 Respiratory:  No grunting, flaring, retractions, breath sounds  "clear and equal            Cardiovascular:  Regular rate and rhythm. No murmur. Adequate perfusion/capillary refill.                     Abdomen:   Soft, non-tender, no masses, bowel sounds present, no HSM             Genitourinary:  Normal male, testes descended, no discharge, swelling, or pain, anus patent                          Spine:   No abnormalities noted        Musculoskeletal:  Full range of motion          Skin/Hair/Nails:   Skin warm, dry, and intact, no rashes or abnormal dyspigmentation or lesions                Neurologic:   No abnormal movement, tone appropriate     Upperco Latch:  Efficiency:               Lips Flanged: Yes              Depth of latch: good              Audible Swallow: Yes, beautiful sustained SSB on both breasts              Visible Milk: Yes              Wide Open/ Asymmetrical: Yes              Suck Swallow Cycle: Breathing: unlabored, Coordinated: yes  Nipple Assessment after latch: Normal: elongated/eraser, no discoloration and no damage noted.  Latch Problems: Angela latched and fed on both breasts without difficulty.  He was content after feeding.     Position:  Infant's Ergonomics/Body               Body Alignment: Yes               Head Supported: Yes               Close to Mom's body/ Lifted/ Supported: Yes               Mom's Ergonomics/Body: Yes, after latch                           Supported: Yes, after latch                           Sitting Back: Yes, after latch                           Brings Baby to her breast: Yes  Positioning Problems: Sara leaned over Angela for latching and then relaxed back in her chair.  They both appeared comfortable and relaxed for the feeding.      Handouts:   None    Education:  Reviewed Positioning for Dyad: demonstrated how to position mom slightly reclined with her baby \"belly to belly\" on top of the breast to improve ability to maintain a deep latch and feed comfortably through faster flow.  Reviewed Frequency/Supply & Demand: " discussed how to down regulate milk production to a more comfortable level for Sara  Reviewed Equipment: discussed how to determine what size flange to use.      Plan:    Reassurance and support  given.  I acknowledged Sara's concerns and her desire to breastfeed.  I encouraged her to continue breastfeeding as often as she desires.  I made some suggestions for positioning which led to a successful feeding at the breast today.  I recommended that she continue pumping when a feeding at the breast is missed and as needed for comfort.  I suggested that she down regulate milk production by gradually decreasing the volume she expresses each pumping session until she no longer experiences uncomfortable engorgement.  I encouraged her to call with any questions or concerns.    I have spent 60 minutes with Patient and family today in which greater than 50% of this time was spent in counseling/coordination of care regarding Patient and family education and Counseling / Coordination of care.

## 2025-06-23 NOTE — PATIENT INSTRUCTIONS
Breastfeed as often as you desire.   Pump if a feeding at the breast is missed, and as needed for comfort.  To decrease milk production, gradually decrease how much milk you express each time you pump.  Please call with any questions or concerns or for a follow up appointment as desired.

## 2025-06-24 ENCOUNTER — NURSE TRIAGE (OUTPATIENT)
Dept: OTHER | Facility: OTHER | Age: 35
End: 2025-06-24

## 2025-06-24 RX ORDER — CLINDAMYCIN HYDROCHLORIDE 300 MG/1
300 CAPSULE ORAL 4 TIMES DAILY
Qty: 40 CAPSULE | Refills: 0 | Status: SHIPPED | OUTPATIENT
Start: 2025-06-24 | End: 2025-07-04

## 2025-06-24 NOTE — TELEPHONE ENCOUNTER
"Regarding: Possible mastitis on left breast  ----- Message from Jayme BRAVO sent at 6/24/2025  7:23 PM EDT -----  \"I believe I have mastitis, this morning my left side felt tender, and hard. I am feeling achy and cold. \"    "

## 2025-06-24 NOTE — TELEPHONE ENCOUNTER
"REASON FOR CONVERSATION: MASTITIS    SYMPTOMS: Calling with L breast lump, pain/tenderness and chills/bodyaches x1 day. Didn't think to take her temp until after ibuprofen so it was normal, but feels similarly to when she had mastitis last pregnancy. She has tried all the care advice- warm compresses, massaging while pumping/nursing etc.     OTHER HEALTH INFORMATION: s/p CS    PROTOCOL DISPOSITION:  Now (overriding See HPC Within 4 Hours (Or PCP Triage))    CARE ADVICE PROVIDED: Communicated with Dr Christie who sent in clindamycin for her- pt to reach out if worsening or not improving. Relayed to patient who verbalized understand and expressed appreciation.    PRACTICE FOLLOW-UP: none      Reason for Disposition   [1] Breast looks infected (e.g., spreading redness, feels hot or painful to touch) AND [2] fever 100.4 F (38.0 C) or higher    Answer Assessment - Initial Assessment Questions  1. SYMPTOM: \"What's the main symptom you're concerned about?\" (e.g., pain, redness, swelling)      Painful tender left side    2. ONSET: \"When did the  this pain  start?\"      This morning    3. LOCATION: \"Which breast?\" (e.g., left, right, both) \"Is the pain in the breast or just the nipple?\"      Left    4. PAIN: \"How bad is the pain?\"  (Scale 1-10; or mild, moderate, severe)      5-6/10    5. REDNESS: \"Does the skin appear red? Does the breast feel hot to touch?\"       no    6. LUMP OR SWELLING: \"Does the breast feel hard or have lumps?\"      Decent     7. DELIVERY DATE: \"When was your delivery date?\" \"Vaginal delivery or ?\"          8. BREASTFEEDING AND PUMPING: \"Did you breastfeed your baby or use a breast pump after delivery?\" If Yes, ask: \"When did you last breastfeed or pump your breasts?\"      Working with lactation    9. FEVER: \"Do you have a fever?\" If Yes, ask: \"What is it, how was it measured, and when did it start?\"      Didn't take temp until after ibuprofen so temp normal but has " "chills    10. OTHER SYMPTOMS: \"Do you have any other symptoms?\" (e.g., feeling sad or depressed, nipple symptoms)        *No Answer*    Protocols used: Postpartum - Breast Pain and Engorgement-Adult-AH    "

## 2025-06-25 NOTE — TELEPHONE ENCOUNTER
Recalled patient to follow up re: breast lump/mastitis - she is still having some (L) breast tenderness but feels slight improvement today & sl decrease in lump size.  Had chills last night nut denies chills today & does not feel feverish so has not checked temp today.  Has taken Clindamycin x 3 doses so far.  She will recall office prn or on 6/27/2025 to update.

## 2025-06-29 NOTE — PROGRESS NOTES
I have reviewed the notes, assessments, and/or procedures performed by Tarah Wilson RN, IBCLC, I concur with her/his documentation of Sara Zhong MD 06/29/25

## (undated) DEVICE — GLOVE INDICATOR PI UNDERGLOVE SZ 6.5 BLUE

## (undated) DEVICE — MEDI-VAC YANKAUER SUCTION HANDLE W/STRAIGHT TIP & CONTROL VENT: Brand: CARDINAL HEALTH

## (undated) DEVICE — Device

## (undated) DEVICE — ADHESIVE SKIN HIGH VISCOSITY EXOFIN 1ML

## (undated) DEVICE — PACK C-SECTION PBDS

## (undated) DEVICE — SUT PLAIN 2-0 CTX 27 IN 872H

## (undated) DEVICE — SUT VICRYL 0 CT-1 36 IN J946H

## (undated) DEVICE — SUT VICRYL 0 CTX 36 IN J978H

## (undated) DEVICE — SUT MONOCRYL 4-0 PS-2 18 IN Y496G

## (undated) DEVICE — SUT STRATAFIX SPIRAL MONOCRYL PLUS 4-0 PS-2 30 X 30 CM SXMP2B409

## (undated) DEVICE — SUT MONOCRYL 0 CTX 36 IN Y398H

## (undated) DEVICE — SUT MONOCRYL 2-0 CT-1 27 IN Y339H

## (undated) DEVICE — GLOVE PI ULTRA TOUCH SZ 6

## (undated) DEVICE — SKIN MARKER DUAL TIP WITH RULER CAP, FLEXIBLE RULER AND LABELS: Brand: DEVON

## (undated) DEVICE — CHLORAPREP HI-LITE 26ML ORANGE